# Patient Record
Sex: MALE | Race: WHITE | NOT HISPANIC OR LATINO | Employment: OTHER | URBAN - NONMETROPOLITAN AREA
[De-identification: names, ages, dates, MRNs, and addresses within clinical notes are randomized per-mention and may not be internally consistent; named-entity substitution may affect disease eponyms.]

---

## 2018-08-13 ENCOUNTER — APPOINTMENT (OUTPATIENT)
Dept: LAB | Facility: HOSPITAL | Age: 63
End: 2018-08-13

## 2018-08-13 ENCOUNTER — TRANSCRIBE ORDERS (OUTPATIENT)
Dept: ADMINISTRATIVE | Facility: HOSPITAL | Age: 63
End: 2018-08-13

## 2018-08-13 DIAGNOSIS — Z00.8 HEALTH EXAMINATION IN POPULATION SURVEY: Primary | ICD-10-CM

## 2018-08-13 DIAGNOSIS — Z00.8 HEALTH EXAMINATION IN POPULATION SURVEY: ICD-10-CM

## 2018-08-13 LAB
CHOLEST SERPL-MCNC: 270 MG/DL (ref 50–200)
EST. AVERAGE GLUCOSE BLD GHB EST-MCNC: 105 MG/DL
HBA1C MFR BLD HPLC: 5.3 %
HBA1C MFR BLD: 5.3 % (ref 4.2–6.3)
HDLC SERPL-MCNC: 44 MG/DL (ref 40–60)
LDLC SERPL CALC-MCNC: 192 MG/DL (ref 0–100)
NONHDLC SERPL-MCNC: 226 MG/DL
TRIGL SERPL-MCNC: 168 MG/DL

## 2018-08-13 PROCEDURE — 36415 COLL VENOUS BLD VENIPUNCTURE: CPT

## 2018-08-13 PROCEDURE — 80061 LIPID PANEL: CPT

## 2018-08-13 PROCEDURE — 83036 HEMOGLOBIN GLYCOSYLATED A1C: CPT

## 2018-08-22 ENCOUNTER — OFFICE VISIT (OUTPATIENT)
Dept: URGENT CARE | Facility: CLINIC | Age: 63
End: 2018-08-22
Payer: COMMERCIAL

## 2018-08-22 VITALS
HEART RATE: 67 BPM | SYSTOLIC BLOOD PRESSURE: 172 MMHG | DIASTOLIC BLOOD PRESSURE: 83 MMHG | RESPIRATION RATE: 18 BRPM | TEMPERATURE: 98.1 F | OXYGEN SATURATION: 98 %

## 2018-08-22 DIAGNOSIS — L23.7 ALLERGIC CONTACT DERMATITIS DUE TO PLANTS, EXCEPT FOOD: Primary | ICD-10-CM

## 2018-08-22 PROCEDURE — S9088 SERVICES PROVIDED IN URGENT: HCPCS | Performed by: NURSE PRACTITIONER

## 2018-08-22 PROCEDURE — 99213 OFFICE O/P EST LOW 20 MIN: CPT | Performed by: NURSE PRACTITIONER

## 2018-08-22 RX ORDER — METHYLPREDNISOLONE 4 MG/1
TABLET ORAL
Qty: 21 TABLET | Refills: 0 | Status: SHIPPED | OUTPATIENT
Start: 2018-08-22 | End: 2018-09-21

## 2018-08-22 RX ADMIN — Medication 10 MG: at 14:42

## 2018-08-22 NOTE — PROGRESS NOTES
[facility]        NAME: Rogene Gitelman is a 63 y o  male  : 1955    MRN: [id number]  DATE: 2018  TIME: 2:30 PM    Assessment and Plan   Allergic contact dermatitis due to plants, except food [L23 7]  1  Allergic contact dermatitis due to plants, except food  dexamethasone (DECADRON) injection 10 mg    Methylprednisolone 4 MG TBPK         Patient Instructions     Use Claritin during the day as directed and Benadryl at night as directed  May apply hydrocortisone cream to affected areas  Apply cool compresses to affected areas  Follow up with PCP in 3-5 days  Proceed to  ER if symptoms worsen  Chief Complaint     Chief Complaint   Patient presents with    Rash     Pt c/o a rash since Wednesday  History of Present Illness       Rash   The current episode started in the past 7 days  The problem has been gradually worsening since onset  The affected locations include the left lower leg, right lower leg and right upper leg  The rash is characterized by redness, swelling and itchiness  He was exposed to plant contact  Past treatments include anti-itch cream and antihistamine  The treatment provided mild relief  Review of Systems   Review of Systems   Constitutional: Negative  HENT: Negative  Eyes: Negative  Respiratory: Negative  Cardiovascular: Negative  Gastrointestinal: Negative  Genitourinary: Negative  Musculoskeletal: Negative  Skin: Positive for rash  Neurological: Negative  Hematological: Negative  Psychiatric/Behavioral: Negative            Current Medications       Current Outpatient Prescriptions:     Methylprednisolone 4 MG TBPK, Use as directed on package, Disp: 21 tablet, Rfl: 0    Current Facility-Administered Medications:     dexamethasone (DECADRON) injection 10 mg, 10 mg, Intramuscular, Once, FAIZAN Samuels    Current Allergies     Allergies as of 2018    (No Known Allergies)            The following portions of the patient's history were reviewed and updated as appropriate: allergies, current medications, past family history, past medical history, past social history, past surgical history and problem list      No past medical history on file  No past surgical history on file  No family history on file  Medications have been verified  Objective   BP (!) 172/83   Pulse 67   Temp 98 1 °F (36 7 °C)   Resp 18   SpO2 98%        Physical Exam     Physical Exam   Constitutional: He is oriented to person, place, and time  He appears well-developed and well-nourished  No distress  HENT:   Head: Normocephalic and atraumatic  Right Ear: External ear normal    Left Ear: External ear normal    Nose: Nose normal    Mouth/Throat: Oropharynx is clear and moist    Eyes: Conjunctivae and EOM are normal  Pupils are equal, round, and reactive to light  Neck: Normal range of motion  Neck supple  Cardiovascular: Normal rate, regular rhythm and normal heart sounds  Pulmonary/Chest: Effort normal and breath sounds normal    Abdominal: Soft  Bowel sounds are normal    Neurological: He is alert and oriented to person, place, and time  He has normal reflexes  Skin: Skin is warm and dry  Rash noted  Rash is maculopapular (large area on letral aspect of right leg from above knee to mid calf area  Scattered areas on left calf )  He is not diaphoretic  Psychiatric: He has a normal mood and affect   His behavior is normal  Judgment and thought content normal

## 2018-09-21 ENCOUNTER — OFFICE VISIT (OUTPATIENT)
Dept: INTERNAL MEDICINE CLINIC | Facility: CLINIC | Age: 63
End: 2018-09-21
Payer: COMMERCIAL

## 2018-09-21 VITALS
HEART RATE: 65 BPM | HEIGHT: 70 IN | RESPIRATION RATE: 16 BRPM | WEIGHT: 200 LBS | SYSTOLIC BLOOD PRESSURE: 134 MMHG | DIASTOLIC BLOOD PRESSURE: 78 MMHG | BODY MASS INDEX: 28.63 KG/M2 | TEMPERATURE: 98.6 F | OXYGEN SATURATION: 98 %

## 2018-09-21 DIAGNOSIS — Z13.29 SCREENING FOR HYPOTHYROIDISM: ICD-10-CM

## 2018-09-21 DIAGNOSIS — N52.9 ERECTILE DYSFUNCTION, UNSPECIFIED ERECTILE DYSFUNCTION TYPE: Primary | ICD-10-CM

## 2018-09-21 DIAGNOSIS — Z13.0 SCREENING, ANEMIA, DEFICIENCY, IRON: ICD-10-CM

## 2018-09-21 DIAGNOSIS — Z91.89 ENCOUNTER FOR HEPATITIS C VIRUS SCREENING TEST FOR HIGH RISK PATIENT: ICD-10-CM

## 2018-09-21 DIAGNOSIS — E78.00 HYPERCHOLESTEROLEMIA: ICD-10-CM

## 2018-09-21 DIAGNOSIS — Z12.5 SCREENING FOR PROSTATE CANCER: ICD-10-CM

## 2018-09-21 DIAGNOSIS — Z13.1 SCREENING FOR DIABETES MELLITUS: ICD-10-CM

## 2018-09-21 DIAGNOSIS — Z11.59 ENCOUNTER FOR HEPATITIS C VIRUS SCREENING TEST FOR HIGH RISK PATIENT: ICD-10-CM

## 2018-09-21 PROCEDURE — 1036F TOBACCO NON-USER: CPT | Performed by: INTERNAL MEDICINE

## 2018-09-21 PROCEDURE — 99204 OFFICE O/P NEW MOD 45 MIN: CPT | Performed by: INTERNAL MEDICINE

## 2018-09-21 PROCEDURE — 3008F BODY MASS INDEX DOCD: CPT | Performed by: INTERNAL MEDICINE

## 2018-09-21 RX ORDER — SILDENAFIL 100 MG/1
100 TABLET, FILM COATED ORAL DAILY PRN
Qty: 10 TABLET | Refills: 11 | Status: SHIPPED | OUTPATIENT
Start: 2018-09-21 | End: 2019-07-17

## 2018-09-21 NOTE — PROGRESS NOTES
Assessment/Plan:       Diagnoses and all orders for this visit:    Erectile dysfunction, unspecified erectile dysfunction type  -     sildenafil (VIAGRA) 100 mg tablet; Take 1 tablet (100 mg total) by mouth daily as needed for erectile dysfunction for up to 10 days    Hypercholesterolemia  -     LDL cholesterol, direct; Future    Encounter for hepatitis C virus screening test for high risk patient  -     Hepatitis C antibody; Future    Screening for prostate cancer  -     PSA, Total Screen    Screening for hypothyroidism  -     TSH, 3rd generation with Free T4 reflex    Screening for diabetes mellitus  -     Comprehensive metabolic panel    Screening, anemia, deficiency, iron  -     CBC and differential    Other orders  -     Cancel: Ambulatory referral to Colorectal Surgery  -     TURMERIC PO; Take by mouth daily        No problem-specific Assessment & Plan notes found for this encounter  The patient will follow up in the next 6 months and see how things go    Subjective:      Patient ID: Robbie Toussaint is a 66 y o  male  The patient was seen and examined and noted to have issues with hypercholesterolemia per the Lipid Panel that was performed just a month ago  The patient states that he has no other issues at this time  The patient states that his father had issues with hyper strokes in his early 46s  His BP is mildly hypertensive  We dicussed statin medication vs lifestyle change  I noted a low cholesterol diet and aerobic exercise might help and we discussed that  He notes issues maintaining an erection and stated he would be interest in a trial of Viagra  His BP is noted to be elevated today and will follow up on that at our next visit  We dicussed vaccination with Shingrix  He has followed up with a PSA on his next labs  The patient had a colonoscopy          The following portions of the patient's history were reviewed and updated as appropriate:   He has a past medical history of Carpal tunnel syndrome and Skin cancer  ,   does not have any pertinent problems on file  ,   has a past surgical history that includes Hernia repair; Back surgery; Neuroplasty / transposition median nerve at carpal tunnel bilateral; and Skin lesion excision  ,  family history includes Hypertension in his mother; Stroke in his father  ,   reports that he has never smoked  He has never used smokeless tobacco  He reports that he drinks alcohol  He reports that he does not use drugs  ,  has No Known Allergies     Current Outpatient Prescriptions   Medication Sig Dispense Refill    TURMERIC PO Take by mouth daily      sildenafil (VIAGRA) 100 mg tablet Take 1 tablet (100 mg total) by mouth daily as needed for erectile dysfunction for up to 10 days 10 tablet 11     No current facility-administered medications for this visit  Review of Systems   Constitutional: Negative for chills, fatigue and fever  HENT: Negative for ear pain, postnasal drip, rhinorrhea, sinus pain and sinus pressure  Respiratory: Negative for choking, shortness of breath and stridor  Cardiovascular: Negative for chest pain and palpitations  Gastrointestinal: Negative for abdominal pain, constipation, diarrhea and nausea  Genitourinary: Negative  Musculoskeletal: Negative for arthralgias, joint swelling and myalgias  Skin: Negative  Neurological: Negative  Psychiatric/Behavioral: Negative  Objective:  Vitals:    09/21/18 1302   BP: 134/78   BP Location: Left arm   Patient Position: Sitting   Cuff Size: Large   Pulse: 65   Resp: 16   Temp: 98 6 °F (37 °C)   SpO2: 98%   Weight: 90 7 kg (200 lb)   Height: 5' 9 5" (1 765 m)     Body mass index is 29 11 kg/m²  Physical Exam   Constitutional: He is oriented to person, place, and time  He appears well-developed and well-nourished  HENT:   Head: Normocephalic and atraumatic  Eyes: Conjunctivae and EOM are normal  Pupils are equal, round, and reactive to light   Right eye exhibits no discharge  No scleral icterus  Neck: Normal range of motion  Neck supple  Cardiovascular: Normal rate, regular rhythm, normal heart sounds and intact distal pulses  Exam reveals no gallop  No murmur heard  Pulmonary/Chest: Effort normal and breath sounds normal  No respiratory distress  He has no wheezes  He has no rales  Abdominal: Soft  Bowel sounds are normal  He exhibits no distension  There is no tenderness  There is no rebound  Musculoskeletal: Normal range of motion  He exhibits no edema or tenderness  Neurological: He is oriented to person, place, and time  No cranial nerve deficit  Coordination normal    Skin: Skin is warm and dry  No rash noted  No erythema  Psychiatric: He has a normal mood and affect  Nursing note and vitals reviewed

## 2019-01-09 ENCOUNTER — APPOINTMENT (OUTPATIENT)
Dept: LAB | Facility: CLINIC | Age: 64
End: 2019-01-09
Payer: COMMERCIAL

## 2019-01-09 DIAGNOSIS — E78.00 HYPERCHOLESTEROLEMIA: ICD-10-CM

## 2019-01-09 DIAGNOSIS — Z91.89 ENCOUNTER FOR HEPATITIS C VIRUS SCREENING TEST FOR HIGH RISK PATIENT: ICD-10-CM

## 2019-01-09 DIAGNOSIS — Z11.59 ENCOUNTER FOR HEPATITIS C VIRUS SCREENING TEST FOR HIGH RISK PATIENT: ICD-10-CM

## 2019-01-09 LAB
ALBUMIN SERPL BCP-MCNC: 4.1 G/DL (ref 3.5–5)
ALP SERPL-CCNC: 51 U/L (ref 46–116)
ALT SERPL W P-5'-P-CCNC: 32 U/L (ref 12–78)
ANION GAP SERPL CALCULATED.3IONS-SCNC: 3 MMOL/L (ref 4–13)
AST SERPL W P-5'-P-CCNC: 19 U/L (ref 5–45)
BASOPHILS # BLD AUTO: 0.06 THOUSANDS/ΜL (ref 0–0.1)
BASOPHILS NFR BLD AUTO: 1 % (ref 0–1)
BILIRUB SERPL-MCNC: 0.39 MG/DL (ref 0.2–1)
BUN SERPL-MCNC: 24 MG/DL (ref 5–25)
CALCIUM SERPL-MCNC: 8.7 MG/DL (ref 8.3–10.1)
CHLORIDE SERPL-SCNC: 105 MMOL/L (ref 100–108)
CO2 SERPL-SCNC: 28 MMOL/L (ref 21–32)
CREAT SERPL-MCNC: 1.01 MG/DL (ref 0.6–1.3)
EOSINOPHIL # BLD AUTO: 0.25 THOUSAND/ΜL (ref 0–0.61)
EOSINOPHIL NFR BLD AUTO: 5 % (ref 0–6)
ERYTHROCYTE [DISTWIDTH] IN BLOOD BY AUTOMATED COUNT: 12.6 % (ref 11.6–15.1)
GFR SERPL CREATININE-BSD FRML MDRD: 80 ML/MIN/1.73SQ M
GLUCOSE P FAST SERPL-MCNC: 98 MG/DL (ref 65–99)
HCT VFR BLD AUTO: 43.2 % (ref 36.5–49.3)
HGB BLD-MCNC: 14.3 G/DL (ref 12–17)
IMM GRANULOCYTES # BLD AUTO: 0.01 THOUSAND/UL (ref 0–0.2)
IMM GRANULOCYTES NFR BLD AUTO: 0 % (ref 0–2)
LDLC SERPL DIRECT ASSAY-MCNC: 181 MG/DL (ref 0–100)
LYMPHOCYTES # BLD AUTO: 2.25 THOUSANDS/ΜL (ref 0.6–4.47)
LYMPHOCYTES NFR BLD AUTO: 42 % (ref 14–44)
MCH RBC QN AUTO: 30.2 PG (ref 26.8–34.3)
MCHC RBC AUTO-ENTMCNC: 33.1 G/DL (ref 31.4–37.4)
MCV RBC AUTO: 91 FL (ref 82–98)
MONOCYTES # BLD AUTO: 0.53 THOUSAND/ΜL (ref 0.17–1.22)
MONOCYTES NFR BLD AUTO: 10 % (ref 4–12)
NEUTROPHILS # BLD AUTO: 2.23 THOUSANDS/ΜL (ref 1.85–7.62)
NEUTS SEG NFR BLD AUTO: 42 % (ref 43–75)
NRBC BLD AUTO-RTO: 0 /100 WBCS
PLATELET # BLD AUTO: 231 THOUSANDS/UL (ref 149–390)
PMV BLD AUTO: 9.7 FL (ref 8.9–12.7)
POTASSIUM SERPL-SCNC: 4.4 MMOL/L (ref 3.5–5.3)
PROT SERPL-MCNC: 7.5 G/DL (ref 6.4–8.2)
PSA SERPL-MCNC: 10.1 NG/ML (ref 0–4)
RBC # BLD AUTO: 4.74 MILLION/UL (ref 3.88–5.62)
SODIUM SERPL-SCNC: 136 MMOL/L (ref 136–145)
TSH SERPL DL<=0.05 MIU/L-ACNC: 0.91 UIU/ML (ref 0.36–3.74)
WBC # BLD AUTO: 5.33 THOUSAND/UL (ref 4.31–10.16)

## 2019-01-09 PROCEDURE — 85025 COMPLETE CBC W/AUTO DIFF WBC: CPT | Performed by: INTERNAL MEDICINE

## 2019-01-09 PROCEDURE — 84443 ASSAY THYROID STIM HORMONE: CPT | Performed by: INTERNAL MEDICINE

## 2019-01-09 PROCEDURE — 83721 ASSAY OF BLOOD LIPOPROTEIN: CPT

## 2019-01-09 PROCEDURE — 36415 COLL VENOUS BLD VENIPUNCTURE: CPT | Performed by: INTERNAL MEDICINE

## 2019-01-09 PROCEDURE — G0103 PSA SCREENING: HCPCS | Performed by: INTERNAL MEDICINE

## 2019-01-09 PROCEDURE — 86803 HEPATITIS C AB TEST: CPT

## 2019-01-09 PROCEDURE — 80053 COMPREHEN METABOLIC PANEL: CPT | Performed by: INTERNAL MEDICINE

## 2019-01-10 LAB — HCV AB SER QL: NORMAL

## 2019-01-18 DIAGNOSIS — R97.20 ELEVATED PSA: Primary | ICD-10-CM

## 2019-02-18 ENCOUNTER — OFFICE VISIT (OUTPATIENT)
Dept: INTERNAL MEDICINE CLINIC | Facility: CLINIC | Age: 64
End: 2019-02-18
Payer: COMMERCIAL

## 2019-02-18 VITALS
WEIGHT: 193.8 LBS | RESPIRATION RATE: 16 BRPM | HEART RATE: 61 BPM | OXYGEN SATURATION: 99 % | DIASTOLIC BLOOD PRESSURE: 70 MMHG | SYSTOLIC BLOOD PRESSURE: 122 MMHG | HEIGHT: 70 IN | BODY MASS INDEX: 27.75 KG/M2 | TEMPERATURE: 98 F

## 2019-02-18 DIAGNOSIS — R97.20 ELEVATED PSA: Primary | ICD-10-CM

## 2019-02-18 DIAGNOSIS — E78.00 HYPERCHOLESTEROLEMIA: ICD-10-CM

## 2019-02-18 PROCEDURE — 99214 OFFICE O/P EST MOD 30 MIN: CPT | Performed by: INTERNAL MEDICINE

## 2019-02-18 PROCEDURE — 1036F TOBACCO NON-USER: CPT | Performed by: INTERNAL MEDICINE

## 2019-02-18 PROCEDURE — 3008F BODY MASS INDEX DOCD: CPT | Performed by: INTERNAL MEDICINE

## 2019-02-18 NOTE — PROGRESS NOTES
Assessment/Plan:       Diagnoses and all orders for this visit:    Elevated PSA  -     PSA, Total Screen; Future    Hypercholesterolemia  -     LDL cholesterol, direct; Future        No problem-specific Assessment & Plan notes found for this encounter  We will follow up on his PSA in the next several weeks  The patient states that he will engage in a low cholesterol diet and will follow up on with a repeat LDL in 6 months  Subjective:      Patient ID: Mino Shay is a 66 y o  male  We reviewed the patient's LDL cholesterol which was noted to be elevated and we discussed a low cholesterol diet  The patient was also noted to have issues with an elevated PSA  We noted that the benign reasons why this would be elevated  I noted we would recheck this and is elevated we would refer her to urology if persistently elevated  The following portions of the patient's history were reviewed and updated as appropriate:   He has a past medical history of Carpal tunnel syndrome and Skin cancer  ,  does not have any pertinent problems on file  ,   has a past surgical history that includes Hernia repair; Back surgery; Neuroplasty / transposition median nerve at carpal tunnel bilateral; and Skin lesion excision  ,  family history includes Hypertension in his mother; Stroke in his father  ,   reports that he has never smoked  He has never used smokeless tobacco  He reports that he drinks alcohol  He reports that he does not use drugs  ,  has No Known Allergies     Current Outpatient Medications   Medication Sig Dispense Refill    TURMERIC PO Take by mouth daily      sildenafil (VIAGRA) 100 mg tablet Take 1 tablet (100 mg total) by mouth daily as needed for erectile dysfunction for up to 10 days 10 tablet 11     No current facility-administered medications for this visit  Review of Systems   Constitutional: Negative for chills, fatigue and fever     HENT: Negative for ear pain, postnasal drip, rhinorrhea, sinus pressure and sinus pain  Respiratory: Negative for cough, chest tightness, shortness of breath and stridor  Cardiovascular: Negative for chest pain, palpitations and leg swelling  Gastrointestinal: Negative for anal bleeding, constipation, diarrhea and nausea  Genitourinary: Negative  Musculoskeletal: Negative for back pain, joint swelling, myalgias and neck pain  Neurological: Negative  Psychiatric/Behavioral: Negative  Objective:  Vitals:    02/18/19 1520   BP: 122/70   BP Location: Left arm   Patient Position: Sitting   Cuff Size: Large   Pulse: 61   Resp: 16   Temp: 98 °F (36 7 °C)   SpO2: 99%   Weight: 87 9 kg (193 lb 12 8 oz)   Height: 5' 9 5" (1 765 m)     Body mass index is 28 21 kg/m²  Physical Exam   Constitutional: He is oriented to person, place, and time  He appears well-developed and well-nourished  HENT:   Head: Normocephalic and atraumatic  Eyes: Pupils are equal, round, and reactive to light  EOM are normal    Neck: Neck supple  Cardiovascular: Normal rate, regular rhythm, normal heart sounds and intact distal pulses  Exam reveals no friction rub  No murmur heard  Pulmonary/Chest: Effort normal and breath sounds normal  No stridor  No respiratory distress  Abdominal: Soft  Bowel sounds are normal  He exhibits no distension  There is no tenderness  Musculoskeletal: Normal range of motion  Neurological: He is alert and oriented to person, place, and time  Skin: Skin is warm and dry  Psychiatric: He has a normal mood and affect

## 2019-04-26 ENCOUNTER — TRANSCRIBE ORDERS (OUTPATIENT)
Dept: LAB | Facility: CLINIC | Age: 64
End: 2019-04-26

## 2019-04-26 ENCOUNTER — APPOINTMENT (OUTPATIENT)
Dept: LAB | Facility: CLINIC | Age: 64
End: 2019-04-26
Payer: COMMERCIAL

## 2019-04-26 DIAGNOSIS — R97.20 ELEVATED PROSTATE SPECIFIC ANTIGEN (PSA): Primary | ICD-10-CM

## 2019-04-26 DIAGNOSIS — E78.00 HYPERCHOLESTEROLEMIA: ICD-10-CM

## 2019-04-26 DIAGNOSIS — R97.20 ELEVATED PROSTATE SPECIFIC ANTIGEN (PSA): ICD-10-CM

## 2019-04-26 LAB — LDLC SERPL DIRECT ASSAY-MCNC: 174 MG/DL (ref 0–100)

## 2019-04-26 PROCEDURE — 36415 COLL VENOUS BLD VENIPUNCTURE: CPT

## 2019-04-26 PROCEDURE — 84153 ASSAY OF PSA TOTAL: CPT

## 2019-04-26 PROCEDURE — 83721 ASSAY OF BLOOD LIPOPROTEIN: CPT

## 2019-04-26 PROCEDURE — 84154 ASSAY OF PSA FREE: CPT

## 2019-04-27 LAB
PSA FREE MFR SERPL: 6.4 %
PSA FREE SERPL-MCNC: 0.86 NG/ML
PSA SERPL-MCNC: 13.4 NG/ML (ref 0–4)

## 2019-05-14 ENCOUNTER — TELEPHONE (OUTPATIENT)
Dept: UROLOGY | Facility: CLINIC | Age: 64
End: 2019-05-14

## 2019-05-20 ENCOUNTER — OFFICE VISIT (OUTPATIENT)
Dept: UROLOGY | Facility: CLINIC | Age: 64
End: 2019-05-20
Payer: COMMERCIAL

## 2019-05-20 VITALS
BODY MASS INDEX: 27.92 KG/M2 | SYSTOLIC BLOOD PRESSURE: 142 MMHG | DIASTOLIC BLOOD PRESSURE: 78 MMHG | WEIGHT: 195 LBS | HEIGHT: 70 IN | HEART RATE: 60 BPM

## 2019-05-20 DIAGNOSIS — R97.20 ELEVATED PSA: Primary | ICD-10-CM

## 2019-05-20 LAB
SL AMB  POCT GLUCOSE, UA: ABNORMAL
SL AMB LEUKOCYTE ESTERASE,UA: ABNORMAL
SL AMB POCT BILIRUBIN,UA: ABNORMAL
SL AMB POCT BLOOD,UA: ABNORMAL
SL AMB POCT CLARITY,UA: CLEAR
SL AMB POCT COLOR,UA: YELLOW
SL AMB POCT KETONES,UA: ABNORMAL
SL AMB POCT NITRITE,UA: ABNORMAL
SL AMB POCT PH,UA: 6
SL AMB POCT SPECIFIC GRAVITY,UA: 1
SL AMB POCT URINE PROTEIN: 30
SL AMB POCT UROBILINOGEN: ABNORMAL

## 2019-05-20 PROCEDURE — 99204 OFFICE O/P NEW MOD 45 MIN: CPT | Performed by: UROLOGY

## 2019-05-20 PROCEDURE — 81002 URINALYSIS NONAUTO W/O SCOPE: CPT | Performed by: UROLOGY

## 2019-05-22 ENCOUNTER — TELEPHONE (OUTPATIENT)
Dept: UROLOGY | Facility: CLINIC | Age: 64
End: 2019-05-22

## 2019-05-22 DIAGNOSIS — R97.20 ELEVATED PSA: Primary | ICD-10-CM

## 2019-05-22 RX ORDER — SULFAMETHOXAZOLE AND TRIMETHOPRIM 800; 160 MG/1; MG/1
1 TABLET ORAL EVERY 12 HOURS SCHEDULED
Qty: 2 TABLET | Refills: 0 | Status: SHIPPED | OUTPATIENT
Start: 2019-05-22 | End: 2019-05-23

## 2019-06-10 ENCOUNTER — PROCEDURE VISIT (OUTPATIENT)
Dept: UROLOGY | Facility: CLINIC | Age: 64
End: 2019-06-10
Payer: COMMERCIAL

## 2019-06-10 VITALS
WEIGHT: 194 LBS | DIASTOLIC BLOOD PRESSURE: 60 MMHG | BODY MASS INDEX: 27.84 KG/M2 | HEART RATE: 58 BPM | SYSTOLIC BLOOD PRESSURE: 130 MMHG

## 2019-06-10 DIAGNOSIS — R97.20 ELEVATED PSA: Primary | ICD-10-CM

## 2019-06-10 PROCEDURE — G0416 PROSTATE BIOPSY, ANY MTHD: HCPCS | Performed by: PATHOLOGY

## 2019-06-10 PROCEDURE — 76942 ECHO GUIDE FOR BIOPSY: CPT | Performed by: UROLOGY

## 2019-06-10 PROCEDURE — 55700 PR BIOPSY OF PROSTATE,NEEDLE/PUNCH: CPT | Performed by: UROLOGY

## 2019-06-10 PROCEDURE — 88344 IMHCHEM/IMCYTCHM EA MLT ANTB: CPT | Performed by: PATHOLOGY

## 2019-06-10 PROCEDURE — 76872 US TRANSRECTAL: CPT | Performed by: UROLOGY

## 2019-06-10 PROCEDURE — 96372 THER/PROPH/DIAG INJ SC/IM: CPT

## 2019-06-10 RX ORDER — GENTAMICIN SULFATE 40 MG/ML
1.1 INJECTION, SOLUTION INTRAMUSCULAR; INTRAVENOUS ONCE
Status: COMPLETED | OUTPATIENT
Start: 2019-06-10 | End: 2019-06-10

## 2019-06-10 RX ADMIN — GENTAMICIN SULFATE 80 MG: 40 INJECTION, SOLUTION INTRAMUSCULAR; INTRAVENOUS at 13:24

## 2019-06-17 PROBLEM — C61 PROSTATE CANCER (HCC): Status: ACTIVE | Noted: 2019-01-18

## 2019-06-19 ENCOUNTER — OFFICE VISIT (OUTPATIENT)
Dept: UROLOGY | Facility: CLINIC | Age: 64
End: 2019-06-19

## 2019-06-19 ENCOUNTER — APPOINTMENT (OUTPATIENT)
Dept: LAB | Facility: MEDICAL CENTER | Age: 64
End: 2019-06-19
Payer: COMMERCIAL

## 2019-06-19 VITALS
BODY MASS INDEX: 27.92 KG/M2 | WEIGHT: 195 LBS | HEART RATE: 60 BPM | HEIGHT: 70 IN | RESPIRATION RATE: 20 BRPM | SYSTOLIC BLOOD PRESSURE: 140 MMHG | DIASTOLIC BLOOD PRESSURE: 80 MMHG

## 2019-06-19 DIAGNOSIS — C61 PROSTATE CANCER (HCC): Primary | ICD-10-CM

## 2019-06-19 LAB
ANION GAP SERPL CALCULATED.3IONS-SCNC: 8 MMOL/L (ref 4–13)
BUN SERPL-MCNC: 18 MG/DL (ref 5–25)
CALCIUM SERPL-MCNC: 9.2 MG/DL (ref 8.3–10.1)
CHLORIDE SERPL-SCNC: 105 MMOL/L (ref 100–108)
CO2 SERPL-SCNC: 27 MMOL/L (ref 21–32)
CREAT SERPL-MCNC: 1.03 MG/DL (ref 0.6–1.3)
GFR SERPL CREATININE-BSD FRML MDRD: 78 ML/MIN/1.73SQ M
GLUCOSE SERPL-MCNC: 92 MG/DL (ref 65–140)
POTASSIUM SERPL-SCNC: 4.4 MMOL/L (ref 3.5–5.3)
SODIUM SERPL-SCNC: 140 MMOL/L (ref 136–145)

## 2019-06-19 PROCEDURE — 36415 COLL VENOUS BLD VENIPUNCTURE: CPT | Performed by: UROLOGY

## 2019-06-19 PROCEDURE — 80048 BASIC METABOLIC PNL TOTAL CA: CPT | Performed by: UROLOGY

## 2019-06-19 PROCEDURE — 99214 OFFICE O/P EST MOD 30 MIN: CPT | Performed by: UROLOGY

## 2019-06-24 ENCOUNTER — TELEPHONE (OUTPATIENT)
Dept: UROLOGY | Facility: MEDICAL CENTER | Age: 64
End: 2019-06-24

## 2019-06-24 ENCOUNTER — PREP FOR PROCEDURE (OUTPATIENT)
Dept: UROLOGY | Facility: CLINIC | Age: 64
End: 2019-06-24

## 2019-06-24 DIAGNOSIS — C61 PROSTATE CANCER (HCC): Primary | ICD-10-CM

## 2019-06-24 RX ORDER — CLINDAMYCIN PHOSPHATE 900 MG/50ML
900 INJECTION INTRAVENOUS EVERY 8 HOURS
Status: CANCELLED | OUTPATIENT
Start: 2019-09-04

## 2019-06-25 ENCOUNTER — HOSPITAL ENCOUNTER (OUTPATIENT)
Dept: CT IMAGING | Facility: HOSPITAL | Age: 64
Discharge: HOME | End: 2019-06-25
Payer: COMMERCIAL

## 2019-06-25 DIAGNOSIS — C61 PROSTATE CANCER (HCC): ICD-10-CM

## 2019-06-25 PROCEDURE — 72194 CT PELVIS W/O & W/DYE: CPT

## 2019-06-25 RX ADMIN — IOHEXOL 100 ML: 350 INJECTION, SOLUTION INTRAVENOUS at 08:41

## 2019-06-27 ENCOUNTER — TELEPHONE (OUTPATIENT)
Dept: UROLOGY | Facility: CLINIC | Age: 64
End: 2019-06-27

## 2019-06-27 ENCOUNTER — TELEPHONE (OUTPATIENT)
Dept: UROLOGY | Facility: MEDICAL CENTER | Age: 64
End: 2019-06-27

## 2019-07-01 ENCOUNTER — TELEPHONE (OUTPATIENT)
Dept: UROLOGY | Facility: AMBULATORY SURGERY CENTER | Age: 64
End: 2019-07-01

## 2019-07-01 NOTE — TELEPHONE ENCOUNTER
Patient managed by Helena Guerrier at [facility] is calling to ask if he can get a call back to discuss results of cat scan  He is aware of his upcoming appointment but requesting call

## 2019-07-01 NOTE — TELEPHONE ENCOUNTER
Can you let him know that there is a bladder stone, which will be addressed at the time of surgery, but no signs of cancer outside the prostate, no enlarged lymph nodes

## 2019-07-16 ENCOUNTER — DOCUMENTATION (OUTPATIENT)
Dept: UROLOGY | Facility: AMBULATORY SURGERY CENTER | Age: 64
End: 2019-07-16

## 2019-07-16 NOTE — PROGRESS NOTES
Oncology Navigator Note: Multidisciplinary Urology Case Review 7/16/19  Physician Recommended Plan     Elvin Judge is a 66 y o  male     Diagnosis: Prostate Cancer, Larry 7    Patient was discussed at the Multidisciplinary Urology Case review on 7/16/19  The group recommended that the patient should have definitive treatment for his Prostate Cancer with either prostatectomy or Radiation therapy

## 2019-07-17 ENCOUNTER — OFFICE VISIT (OUTPATIENT)
Dept: UROLOGY | Facility: CLINIC | Age: 64
End: 2019-07-17
Payer: COMMERCIAL

## 2019-07-17 VITALS
SYSTOLIC BLOOD PRESSURE: 122 MMHG | HEART RATE: 68 BPM | BODY MASS INDEX: 27.63 KG/M2 | WEIGHT: 193 LBS | HEIGHT: 70 IN | DIASTOLIC BLOOD PRESSURE: 80 MMHG

## 2019-07-17 DIAGNOSIS — C61 PROSTATE CANCER (HCC): Primary | ICD-10-CM

## 2019-07-17 DIAGNOSIS — N52.9 ERECTILE DYSFUNCTION, UNSPECIFIED ERECTILE DYSFUNCTION TYPE: ICD-10-CM

## 2019-07-17 PROCEDURE — 99214 OFFICE O/P EST MOD 30 MIN: CPT | Performed by: UROLOGY

## 2019-07-17 RX ORDER — SILDENAFIL CITRATE 20 MG/1
20 TABLET ORAL ONCE
Qty: 90 TABLET | Refills: 1 | Status: SHIPPED | OUTPATIENT
Start: 2019-07-17 | End: 2020-04-27

## 2019-07-17 NOTE — PROGRESS NOTES
UROLOGY FOLLOWUP NOTE     CHIEF COMPLAINT   Devon Gregory is a 66 y o  male with a complaint of   Chief Complaint   Patient presents with    Prostate Cancer       History of Present Illness:     66 y o  male who recently underwent PSA testing an outside urologist PSA was elevated at 10  Repeat PSA karen to 13  Patient denies any significant urinary symptoms of infection or inflammation  He denies a family history of prostate cancer although he thinks his father may have had some type of prostate procedure  No prostate cancer in his brothers  Presents today for urgent evaluation  Of note, his daughter is a 77 [facility] nurse and his wife is also an RN  Lab Results   Component Value Date    PSA 13 4 (H) 04/26/2019    PSA 10 1 (H) 01/09/2019     AUA SS 7, LYLE 19  Uses Viagra 100 mg    Patient was previously scheduled for biopsy would either due to an acute GI illness or reaction to the antibiotics, the patient had vomiting and diarrhea the day leading into his procedure  We reschedule the procedure with pre-administration of IM gentamycin  Patient does have intermediate repair risk prostate cancer  He underwent a CT of the pelvis and returns for results  He was referred to Radiation Oncology but is interested in moving forward with surgical therapy  He presents with his wife for discussion      Past Medical History:     Past Medical History:   Diagnosis Date    Carpal tunnel syndrome     Prostate cancer ([facility] [address] )     Skin cancer        PAST SURGICAL HISTORY:     Past Surgical History:   Procedure Laterality Date    BACK SURGERY      HERNIATED DISC    HERNIA REPAIR      NEUROPLASTY / TRANSPOSITION MEDIAN NERVE AT CARPAL TUNNEL BILATERAL      PROSTATE BIOPSY      SKIN LESION EXCISION         CURRENT MEDICATIONS:     Current Outpatient Medications   Medication Sig Dispense Refill    TURMERIC PO Take by mouth daily      bisacodyl (FLEET) 10 MG/30ML ENEM Insert 30 mL (10 mg total) into the rectum once for 1 dose Day of biopsy 30 mL 0    sildenafil (REVATIO) 20 mg tablet Take 1 tablet (20 mg total) by mouth once for 1 dose On empty stomach, one hour before intercourse 90 tablet 1     No current facility-administered medications for this visit  ALLERGIES:     Allergies   Allergen Reactions    Ciprofloxacin GI Intolerance    Keflex [Cephalexin] GI Intolerance       SOCIAL HISTORY:     Social History     Socioeconomic History    Marital status: /Civil Union     Spouse name: None    Number of children: None    Years of education: None    Highest education level: None   Occupational History    Occupation: EMPLOYED   Social Needs    Financial resource strain: None    Food insecurity:     Worry: None     Inability: None    Transportation needs:     Medical: None     Non-medical: None   Tobacco Use    Smoking status: Never Smoker    Smokeless tobacco: Never Used   Substance and Sexual Activity    Alcohol use: Yes    Drug use: No    Sexual activity: None   Lifestyle    Physical activity:     Days per week: None     Minutes per session: None    Stress: None   Relationships    Social connections:     Talks on phone: None     Gets together: None     Attends Worship service: None     Active member of club or organization: None     Attends meetings of clubs or organizations: None     Relationship status: None    Intimate partner violence:     Fear of current or ex partner: None     Emotionally abused: None     Physically abused: None     Forced sexual activity: None   Other Topics Concern    None   Social History Narrative        EMPLOYED       SOCIAL HISTORY:     Family History   Problem Relation Age of Onset    Hypertension Mother     Stroke Father        REVIEW OF SYSTEMS:     Review of Systems   Constitutional: Negative  Respiratory: Negative  Cardiovascular: Negative  Gastrointestinal: Negative  Genitourinary: Negative  Musculoskeletal: Negative  Skin: Negative  Psychiatric/Behavioral: Negative  PHYSICAL EXAM:     /80   Pulse 68   Ht 5' 10" (1 778 m)   Wt 87 5 kg (193 lb)   BMI 27 69 kg/m²     General:  Healthy appearing male in no acute distress  They have a normal affect  There is not appear to be any gross neurologic defects or abnormalities  HEENT:  Normocephalic, atraumatic  Neck is supple without any palpable lymphadenopathy  Cardiovascular:  Patient has normal palpable distal radial pulses  There is no significant peripheral edema  No JVD is noted  Respiratory:  Patient has unlabored respirations  There is no audible wheeze or rhonchi  Abdomen:  Abdomen is without surgical scars  Abdomen is soft and nontender  There is no tympany  Inguinal and umbilical hernia are not appreciated  Musculoskeletal:  Patient does not have significant CVA tenderness in the  flank with palpation or percussion  They full range of motion in all 4 extremities  Strength in all 4 extremities appears congruent  Patient is able to ambulate without assistance or difficulty  Dermatologic:  Patient has no skin abnormalities or rashes  LABS:     CBC:   Lab Results   Component Value Date    WBC 5 33 2019    HGB 14 3 2019    HCT 43 2 2019    MCV 91 2019     2019       BMP:   Lab Results   Component Value Date    CALCIUM 9 2 2019    K 4 4 2019    CO2 27 2019     2019    BUN 18 2019    CREATININE 1 03 2019     Lab Results   Component Value Date    PSA 13 4 (H) 2019    PSA 10 1 (H) 2019     IMAGIN/25/19  CT PELVIS WITH IV CONTRAST     INDICATION:   C61: Malignant neoplasm of prostate      COMPARISON:  None      TECHNIQUE: CT examination of the pelvis was performed    Axial, sagittal, and coronal 2D reformatted images were created from the source data and submitted for interpretation      Radiation dose length product (DLP) for this visit:  434 2 mGy-cm   This examination, like all CT scans performed in the [facility], was performed utilizing techniques to minimize radiation dose exposure, including the use of iterative   reconstruction and automated exposure control      IV Contrast:  100 mL of iohexol (OMNIPAQUE)  Enteric Contrast:  Enteric contrast was administered       FINDINGS:     REPRODUCTIVE ORGANS:  Prostamegaly protruding into the bladder base      URINARY BLADDER:  4 mm right bladder calculus     APPENDIX:  No findings to suggest appendicitis      VISUALIZED BOWEL:  Unremarkable      ABDOMINOPELVIC CAVITY:  No ascites or free intraperitoneal air  No lymphadenopathy  VISUALIZED VESSELS:  Unremarkable for patient's age  ABDOMINOPELVIC WALL/INGUINAL REGIONS: Fat-containing left inguinal hernia     OSSEOUS STRUCTURES:  No acute fracture or destructive osseous lesion      IMPRESSION:     No signs of metastatic disease to the pelvis      Prostamegaly      4 mm right bladder calculus  PATHOLOGY:     6/10/19 - TRUS 38 5g  Final Diagnosis   A  Prostate, left lateral base, needle core biopsy:  - Prostatic adenocarcinoma, [place] grade 3 + 4 = 7 (70% pattern 3 and 30% pattern 4, Grade group 2), two small foci of cancer, involving one of one core, 30% of the core, and spanning 80% of the core discontinuously  - Perineural invasion is present      B  Prostate,  left lateral mid, needle core biopsy:  - Prostatic adenocarcinoma, Larry grade 3 + 3 = 6 (Grade group 1), involving one of one core and 10% of the tissue   - Focal high-grade prostatic intraepithelial neoplasia (HGPIN)  - Perineural invasion is absent  C  Prostate,  left lateral apex, needle core biopsy:  - Prostatic adenocarcinoma, Larry grade 3 + 4 = 7 (80% pattern 3 and 20% pattern 4, Grade group 2), involving one of one core and 20% of the tissue   - Perineural invasion is present      D   Prostate,  left medial base, needle core biopsy:  - Benign prostate glands and stroma      E  Prostate,  left medial mid, needle core biopsy:  - Prostatic adenocarcinoma, Larry grade 3 + 4 = 7 (90% pattern 3 and 10% pattern 4, Grade group 2), involving one of two cores and 70% of the involved core  - Perineural invasion is present  F  Prostate,  left medial apex, needle core biopsy:  - Prostatic adenocarcinoma, [place] grade 3 + 3 = 6 (Grade group 1), involving one of one core, 30% of the tissue,  and spanning 90% of the core discontinuously  - Perineural invasion is absent  G  Prostate, right lateral base, needle core biopsy:  - Benign prostate glands and stroma      H  Prostate, right lateral mid, needle core biopsy:  - Prostatic adenocarcinoma, [place] grade 3 + 4 = 7 (80% pattern 3 and 20% pattern 4, Grade group 2), involving one of one core and 5% of the tissue   - High-grade prostatic intraepithelial neoplasia (HGPIN)  - Perineural invasion is absent      I  Prostate, right lateral apex, needle core biopsy:  - Prostatic adenocarcinoma, Larry grade 3 + 3 = 6 (Grade group 1), involving one of one core and 30% of the tissue   - Perineural invasion is absent  J  Prostate, right medial base, needle core biopsy:  - Prostatic adenocarcinoma, [place] grade 3 + 3 = 6 (Grade group 1), involving one of one core and 5% of the tissue   - Focal high-grade prostatic intraepithelial neoplasia (HGPIN)  - Perineural invasion is absent  K  Prostate, right medial mid, needle core biopsy:  - Prostatic adenocarcinoma, Larry grade 3 + 4 = 7 (70% pattern 3 and 30% pattern 4, Grade group 2), discontinuously involving one of one core and 40% of the tissue   - Perineural invasion is absent      L  Prostate, right medial apex, needle core biopsy:  - Prostatic adenocarcinoma, [place] grade 3 + 4 = 7 (90% pattern 3 and 10% pattern 4, Grade group 2), involving one of one core and 40% of the tissue   - Perineural invasion is absent       PRETREATMENT NOMOGRAM:         ASSESSMENT: 66 y o  male with newly diagnosed cT1c prostate adenocarcinoma, 10/12 cores positive, highest Larry score 3+4=7    PLAN:     Patient has decided to proceed with radical prostatectomy  He is scheduled on 9/4/2019  we reviewed in great depth the plan for the surgery, the inherent risks and benefits and the postoperative course  I did offer the patient pre ability shin with pelvic floor physical therapy which he is accepted  I discussed with the patient the plan for bilateral nerve-sparing should the planes be sufficient at the time of surgery  Patient's primary goal is cancer control and so we discussed wide resection if there is any concern  All questions have been answered and patient will proceed in September

## 2019-07-22 ENCOUNTER — TELEPHONE (OUTPATIENT)
Dept: UROLOGY | Facility: MEDICAL CENTER | Age: 64
End: 2019-07-22

## 2019-07-22 NOTE — TELEPHONE ENCOUNTER
Patient of Dr Annetta Medellin seen in [facility] office  Patient wife called in stated the fax machine is down and if her FMLA paperwork was sent office was unable to receive  Wife requesting email to be sent to [email or web address] and a copy as well for her records

## 2019-07-24 NOTE — TELEPHONE ENCOUNTER
There is no [place] paper work on file for the pt's wife  There is nothing in his chart nor do Cecily or myself have copies of anything for her  I also confirmed with Brandie at the [place] [id number] office where the pt was seen that there is no paper work there either  I called and left a message for Janis Fearing asking for her to call back to discuss the paper work and how she will send me a copy of it to be filled out and sent back to her

## 2019-07-29 ENCOUNTER — TELEPHONE (OUTPATIENT)
Dept: UROLOGY | Facility: CLINIC | Age: 64
End: 2019-07-29

## 2019-07-29 ENCOUNTER — TELEPHONE (OUTPATIENT)
Dept: PHYSICAL THERAPY | Facility: CLINIC | Age: 64
End: 2019-07-29

## 2019-07-29 NOTE — TELEPHONE ENCOUNTER
Returned patients phone call in regards to scheduling for PT evaluation  Left voicemail with return number

## 2019-07-29 NOTE — TELEPHONE ENCOUNTER
Contacted and spoke with patient in regards to his FMLA paperwork  Informed patient his paperwork is complete  Patient wishes paperwork to be mailed to his home address  FMLA mailed today  Copies made for chart

## 2019-08-05 ENCOUNTER — EVALUATION (OUTPATIENT)
Dept: PHYSICAL THERAPY | Facility: CLINIC | Age: 64
End: 2019-08-05
Payer: COMMERCIAL

## 2019-08-05 DIAGNOSIS — C61 PROSTATE CANCER (HCC): Primary | ICD-10-CM

## 2019-08-05 PROCEDURE — 97162 PT EVAL MOD COMPLEX 30 MIN: CPT | Performed by: PHYSICAL THERAPIST

## 2019-08-05 PROCEDURE — 97112 NEUROMUSCULAR REEDUCATION: CPT | Performed by: PHYSICAL THERAPIST

## 2019-08-05 NOTE — PROGRESS NOTES
PT Evaluation     Today's date: 2019  Patient name: Fani Cardoso  : 1955  MRN: [id number]  Referring provider: Saima Lobo, *  Dx:   Encounter Diagnosis     ICD-10-CM    1  Prostate cancer ([facility] [address] ) C61        Start Time: 1100  Stop Time: 1200  Total time in clinic (min): 60 minutes    Assessment  Assessment details: Mr Matthew Cortez presents for PT evaluation for education regarding pelvic floor exercises  He is newly diagnosed cT1c prostate adenocarcinoma, 10/12 cores positive, highest [place] score 3+4=7 per last urology note  He is scheduled for Robotic Assisted Prostatectomy on 19  The patient demonstrated good PFM coordination, fair strength, good recruitment and derecruitment, good endurance and exercise form during PT evaluation  He was provided with an individualized home exercise program as well as home recommendations to reduce post op SHAYNA during lifting/coughing/transition activities  The patient was educated on normal post op return of continence and was encouraged to return to PT with any additional exercise needs at the instruction of his physician  No further PT needs at this time  Thank you for referring Mr Whitman to PT  Goals  STGs to be met in 4 weeks:  * Patient will be compliant with introductory HEP as prescribed  Progress: Provided at IE  * Patient presents with good understanding of pelvic floor protective strategies to reduce intra-abdominal pressure against pelvic organs  Progress MET at IE  LTGs (12 weeks post op)  * Normalize sEMG findings to indicate strength average > 12uV and resting average < 2 0uV  Progress: Demonstrated appropriately at IE  * Demonstrates correct isolation and relaxation of pelvic floor to palpation without overflow from global stabilizers  * Reports that she/he can cough/laugh/sneeze with at least 50% less bladder leakage 12 weeks post op  Progress: TBD      * Patient will use pelvic floor muscles correctly during functional ADLs such as coughing, sneezing, lifting and exercise activities to avoid excessive IAP and PFM strain  Progress:  Demonstrated appropriately at IE  * Patient will be compliant with comprehensive home exercise program for self management of condition  HEP provided at IE  Plan  Planned therapy interventions: therapeutic exercise, therapeutic activities, neuromuscular re-education and home exercise program  Treatment plan discussed with: patient        PT Pelvic Floor Subjective:   History of Present Illness:   Pt presents for Pre-Op PT evaluation to learn exercises prior to his prostatectomy scheduled for 2019  The patient reports he does not have prior experience with pelvic floor exercises  He denies any bowel or bladder symptoms at this time  Social Support:     Lives in:  Multiple-level home    Lives with:  Spouse and adult children    Relationship status: /committed    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 [employer]     Life stress level: 7  Diet and Exercise:      "I"m always on the go"  Pt reports that he stays active but no specific exercise program    Bladder Function:     Voiding Difficulties comments:     Urinary leakage: no urine leakage    Nocturia (episodes per night): 0    Intake (ounces): Water: 72, Coffee: 8, Alcohol intake (oz): occasionally a glass of wine  Bowel Function:     Bowel frequency: daily  Pain:     Current pain ratin  Patient Goals: Other patient goals:  Learn exercises to improve continence  Objective   Pelvic Floor Exam     SMEG Biofeedback   Sensor used: external sensors placed perianally  Recruitment: brisk  Holding ability: good  Derecruitment: goodwithin normal limits               Precautions: Hx Prostate CA    Biofeedback  · 5W10R: x10 reps  HEP:   · Hooklying pelvic floor contreaction: 10x, exhalation  · Seated hip ABD with PF contraction and resistance loop: 10x, exhalation    · Supine bridge with PFM contraction  · Seated exhale with PFM contraction and hand to mouth/cough  · Seated ball squeeze  · Standing ski pose 110x10 seconds, breathing normally

## 2019-08-22 ENCOUNTER — ANESTHESIA EVENT (OUTPATIENT)
Dept: PERIOP | Facility: HOSPITAL | Age: 64
End: 2019-08-22
Payer: COMMERCIAL

## 2019-08-22 RX ORDER — SODIUM CHLORIDE 9 MG/ML
125 INJECTION, SOLUTION INTRAVENOUS CONTINUOUS
Status: CANCELLED | OUTPATIENT
Start: 2019-09-04

## 2019-08-23 ENCOUNTER — APPOINTMENT (OUTPATIENT)
Dept: PREADMISSION TESTING | Facility: HOSPITAL | Age: 64
End: 2019-08-23
Payer: COMMERCIAL

## 2019-08-23 ENCOUNTER — HOSPITAL ENCOUNTER (OUTPATIENT)
Dept: NON INVASIVE DIAGNOSTICS | Facility: HOSPITAL | Age: 64
Discharge: HOME | End: 2019-08-23
Attending: UROLOGY
Payer: COMMERCIAL

## 2019-08-23 ENCOUNTER — APPOINTMENT (OUTPATIENT)
Dept: LAB | Facility: HOSPITAL | Age: 64
End: 2019-08-23
Attending: UROLOGY
Payer: COMMERCIAL

## 2019-08-23 DIAGNOSIS — C61 PROSTATE CANCER (HCC): ICD-10-CM

## 2019-08-23 LAB
ABO GROUP BLD: NORMAL
ANION GAP SERPL CALCULATED.3IONS-SCNC: 8 MMOL/L (ref 4–13)
APTT PPP: 26 SECONDS (ref 23–37)
ATRIAL RATE: 56 BPM
BASOPHILS # BLD AUTO: 0.07 THOUSANDS/ΜL (ref 0–0.1)
BASOPHILS NFR BLD AUTO: 1 % (ref 0–1)
BLD GP AB SCN SERPL QL: NEGATIVE
BUN SERPL-MCNC: 20 MG/DL (ref 5–25)
CALCIUM SERPL-MCNC: 9.1 MG/DL (ref 8.3–10.1)
CHLORIDE SERPL-SCNC: 105 MMOL/L (ref 100–108)
CO2 SERPL-SCNC: 28 MMOL/L (ref 21–32)
CREAT SERPL-MCNC: 1.01 MG/DL (ref 0.6–1.3)
EOSINOPHIL # BLD AUTO: 0.25 THOUSAND/ΜL (ref 0–0.61)
EOSINOPHIL NFR BLD AUTO: 5 % (ref 0–6)
ERYTHROCYTE [DISTWIDTH] IN BLOOD BY AUTOMATED COUNT: 12.9 % (ref 11.6–15.1)
GFR SERPL CREATININE-BSD FRML MDRD: 80 ML/MIN/1.73SQ M
GLUCOSE SERPL-MCNC: 93 MG/DL (ref 65–140)
HCT VFR BLD AUTO: 43.1 % (ref 36.5–49.3)
HGB BLD-MCNC: 14.3 G/DL (ref 12–17)
IMM GRANULOCYTES # BLD AUTO: 0.01 THOUSAND/UL (ref 0–0.2)
IMM GRANULOCYTES NFR BLD AUTO: 0 % (ref 0–2)
INR PPP: 0.97 (ref 0.84–1.19)
LYMPHOCYTES # BLD AUTO: 1.8 THOUSANDS/ΜL (ref 0.6–4.47)
LYMPHOCYTES NFR BLD AUTO: 36 % (ref 14–44)
MCH RBC QN AUTO: 30.4 PG (ref 26.8–34.3)
MCHC RBC AUTO-ENTMCNC: 33.2 G/DL (ref 31.4–37.4)
MCV RBC AUTO: 92 FL (ref 82–98)
MONOCYTES # BLD AUTO: 0.43 THOUSAND/ΜL (ref 0.17–1.22)
MONOCYTES NFR BLD AUTO: 9 % (ref 4–12)
NEUTROPHILS # BLD AUTO: 2.43 THOUSANDS/ΜL (ref 1.85–7.62)
NEUTS SEG NFR BLD AUTO: 49 % (ref 43–75)
NRBC BLD AUTO-RTO: 0 /100 WBCS
P AXIS: 48 DEGREES
PLATELET # BLD AUTO: 206 THOUSANDS/UL (ref 149–390)
PMV BLD AUTO: 9.4 FL (ref 8.9–12.7)
POTASSIUM SERPL-SCNC: 4.3 MMOL/L (ref 3.5–5.3)
PR INTERVAL: 166 MS
PROTHROMBIN TIME: 13 SECONDS (ref 11.6–14.5)
QRS AXIS: 30 DEGREES
QRSD INTERVAL: 88 MS
QT INTERVAL: 416 MS
QTC INTERVAL: 401 MS
RBC # BLD AUTO: 4.71 MILLION/UL (ref 3.88–5.62)
RH BLD: POSITIVE
SODIUM SERPL-SCNC: 141 MMOL/L (ref 136–145)
SPECIMEN EXPIRATION DATE: NORMAL
T WAVE AXIS: 24 DEGREES
VENTRICULAR RATE: 56 BPM
WBC # BLD AUTO: 4.99 THOUSAND/UL (ref 4.31–10.16)

## 2019-08-23 PROCEDURE — 93005 ELECTROCARDIOGRAM TRACING: CPT | Performed by: UROLOGY

## 2019-08-23 PROCEDURE — 93010 ELECTROCARDIOGRAM REPORT: CPT | Performed by: INTERNAL MEDICINE

## 2019-08-23 PROCEDURE — 85025 COMPLETE CBC W/AUTO DIFF WBC: CPT

## 2019-08-23 PROCEDURE — 85610 PROTHROMBIN TIME: CPT

## 2019-08-23 PROCEDURE — 85730 THROMBOPLASTIN TIME PARTIAL: CPT

## 2019-08-23 PROCEDURE — 86900 BLOOD TYPING SEROLOGIC ABO: CPT

## 2019-08-23 PROCEDURE — 36415 COLL VENOUS BLD VENIPUNCTURE: CPT

## 2019-08-23 PROCEDURE — 86920 COMPATIBILITY TEST SPIN: CPT

## 2019-08-23 PROCEDURE — 86850 RBC ANTIBODY SCREEN: CPT

## 2019-08-23 PROCEDURE — 86901 BLOOD TYPING SEROLOGIC RH(D): CPT

## 2019-08-23 PROCEDURE — 80048 BASIC METABOLIC PNL TOTAL CA: CPT

## 2019-08-23 RX ORDER — OMEGA-3 FATTY ACIDS/FISH OIL 300-1000MG
CAPSULE ORAL
COMMUNITY
End: 2019-09-06 | Stop reason: HOSPADM

## 2019-08-23 NOTE — ANESTHESIA PREPROCEDURE EVALUATION
Review of Systems/Medical History  Patient summary reviewed  Chart reviewed  History of anesthetic complications PONV    Cardiovascular  Hyperlipidemia,    Pulmonary  Negative pulmonary ROS        GI/Hepatic    GERD well controlled,        Prostatic disorder, history of prostate cancer       Endo/Other     GYN       Hematology  Negative hematology ROS      Musculoskeletal  Back pain (HCD) , cervical pain,   Arthritis     Neurology  Negative neurology ROS      Psychology   Negative psychology ROS              Physical Exam    Airway    Mallampati score: II  TM Distance: >3 FB  Neck ROM: full     Dental   No notable dental hx     Cardiovascular  Rhythm: regular, Rate: normal, Cardiovascular exam normal    Pulmonary  Pulmonary exam normal Breath sounds clear to auscultation,     Other Findings        Anesthesia Plan  ASA Score- 2     Anesthesia Type- general with ASA Monitors  Additional Monitors:   Airway Plan: ETT  Plan Factors-Patient not instructed to abstain from smoking on day of procedure  Patient did not smoke on day of surgery  Induction- intravenous  Postoperative Plan-     Informed Consent- Anesthetic plan and risks discussed with patient  I personally reviewed this patient with the CRNA  Discussed and agreed on the Anesthesia Plan with the CRNA  Kamryn Alford

## 2019-08-23 NOTE — PRE-PROCEDURE INSTRUCTIONS
Pre-Surgery Instructions:   Medication Instructions    Ibuprofen 200 MG CAPS Patient was instructed by Physician and understands  Patient seen by Carter Son instructed   Patient given/ instructed on use of chlorhexidine soap per hospital protocol    Patient instructed to stop all ASA, NSAIDS, vitamins and herbal supplements one week prior to surgery or per Dr Alvarez Seo

## 2019-08-26 ENCOUNTER — TELEPHONE (OUTPATIENT)
Dept: UROLOGY | Facility: AMBULATORY SURGERY CENTER | Age: 64
End: 2019-08-26

## 2019-08-26 NOTE — TELEPHONE ENCOUNTER
Zach Ferrara is scheduled for RALP with Dr Mony Callaway on 9/4/19  Called him  L/M on home phone and cell phone requesting a call back  Left my direct extension

## 2019-08-28 ENCOUNTER — TELEPHONE (OUTPATIENT)
Dept: UROLOGY | Facility: CLINIC | Age: 64
End: 2019-08-28

## 2019-08-28 ENCOUNTER — TELEPHONE (OUTPATIENT)
Dept: UROLOGY | Facility: MEDICAL CENTER | Age: 64
End: 2019-08-28

## 2019-08-28 NOTE — TELEPHONE ENCOUNTER
Patient of Dr Mandy Moreno seen in the [facility] office  Patient wife called and advised that 9/20/19 did not work for her because she has to drive the patient  I advised patient wife that I only see that the location changed not the date  She stated that she spoke to someone this morning  Did not see a note in epic  Please advise

## 2019-08-28 NOTE — TELEPHONE ENCOUNTER
I called and spoke to patients wife Salomón Geller in regards to rescheduling his post op/ralp/blnd that was scheduled at [facility] on 9/16th  I explained to her that due to a schedule change, Dr Ho Oscar will be in the OR that day and we would like to reschedule this appointment to 9/20/19 at 1:45  Patients wife became very angry and stated that her FMLA runs out on 9/16/19 and that she is unable to extend her FMLA  I explained to her that I would see what else we could do to accomodate this appointment  Please advise  Thank you

## 2019-08-28 NOTE — TELEPHONE ENCOUNTER
Spoke with patient and he has agreed on 9/10/19 at 2:30, also aware that appointment is at the [facility]

## 2019-08-28 NOTE — TELEPHONE ENCOUNTER
Can consider 9/10 at 2:30 with me  Pathology should be back  Patient may need an RN appointment closer to 9/16 for low removal depending on how surgery goes  His wife would not necessarily need to be present for that   Thanks

## 2019-09-04 ENCOUNTER — APPOINTMENT (OUTPATIENT)
Dept: ULTRASOUND IMAGING | Facility: HOSPITAL | Age: 64
End: 2019-09-04
Payer: COMMERCIAL

## 2019-09-04 ENCOUNTER — ANESTHESIA (OUTPATIENT)
Dept: PERIOP | Facility: HOSPITAL | Age: 64
End: 2019-09-04
Payer: COMMERCIAL

## 2019-09-04 ENCOUNTER — APPOINTMENT (OUTPATIENT)
Dept: RADIOLOGY | Facility: HOSPITAL | Age: 64
End: 2019-09-04
Payer: COMMERCIAL

## 2019-09-04 ENCOUNTER — HOSPITAL ENCOUNTER (OUTPATIENT)
Facility: HOSPITAL | Age: 64
Setting detail: OBSERVATION
Discharge: HOME | End: 2019-09-06
Attending: UROLOGY | Admitting: UROLOGY
Payer: COMMERCIAL

## 2019-09-04 DIAGNOSIS — Z09 FOLLOW UP: ICD-10-CM

## 2019-09-04 DIAGNOSIS — C61 PROSTATE CANCER (HCC): ICD-10-CM

## 2019-09-04 PROBLEM — Q62.5 DUPLICATED LEFT RENAL COLLECTING SYSTEM: Status: ACTIVE | Noted: 2019-09-04

## 2019-09-04 PROBLEM — Q62.63: Status: ACTIVE | Noted: 2019-09-04

## 2019-09-04 LAB
ANION GAP SERPL CALCULATED.3IONS-SCNC: 13 MMOL/L (ref 4–13)
BUN SERPL-MCNC: 21 MG/DL (ref 5–25)
CALCIUM SERPL-MCNC: 7.9 MG/DL (ref 8.3–10.1)
CHLORIDE SERPL-SCNC: 105 MMOL/L (ref 100–108)
CO2 SERPL-SCNC: 22 MMOL/L (ref 21–32)
CREAT SERPL-MCNC: 1.56 MG/DL (ref 0.6–1.3)
ERYTHROCYTE [DISTWIDTH] IN BLOOD BY AUTOMATED COUNT: 12.9 % (ref 11.6–15.1)
GFR SERPL CREATININE-BSD FRML MDRD: 47 ML/MIN/1.73SQ M
GLUCOSE P FAST SERPL-MCNC: 164 MG/DL (ref 65–99)
GLUCOSE SERPL-MCNC: 164 MG/DL (ref 65–140)
HCT VFR BLD AUTO: 41.5 % (ref 36.5–49.3)
HGB BLD-MCNC: 13.6 G/DL (ref 12–17)
MCH RBC QN AUTO: 30.7 PG (ref 26.8–34.3)
MCHC RBC AUTO-ENTMCNC: 32.8 G/DL (ref 31.4–37.4)
MCV RBC AUTO: 94 FL (ref 82–98)
PLATELET # BLD AUTO: 226 THOUSANDS/UL (ref 149–390)
PMV BLD AUTO: 9.2 FL (ref 8.9–12.7)
POTASSIUM SERPL-SCNC: 4.6 MMOL/L (ref 3.5–5.3)
RBC # BLD AUTO: 4.43 MILLION/UL (ref 3.88–5.62)
SODIUM SERPL-SCNC: 140 MMOL/L (ref 136–145)
WBC # BLD AUTO: 18.67 THOUSAND/UL (ref 4.31–10.16)

## 2019-09-04 PROCEDURE — 55866 LAPS SURG PRST8ECT RPBIC RAD: CPT | Performed by: PHYSICIAN ASSISTANT

## 2019-09-04 PROCEDURE — NC001 PR NO CHARGE: Performed by: UROLOGY

## 2019-09-04 PROCEDURE — 50780 REIMPLANT URETER IN BLADDER: CPT | Performed by: UROLOGY

## 2019-09-04 PROCEDURE — 38571 LAPAROSCOPY LYMPHADENECTOMY: CPT | Performed by: UROLOGY

## 2019-09-04 PROCEDURE — 88342 IMHCHEM/IMCYTCHM 1ST ANTB: CPT | Performed by: PATHOLOGY

## 2019-09-04 PROCEDURE — 85027 COMPLETE CBC AUTOMATED: CPT | Performed by: UROLOGY

## 2019-09-04 PROCEDURE — 88309 TISSUE EXAM BY PATHOLOGIST: CPT | Performed by: PATHOLOGY

## 2019-09-04 PROCEDURE — 38571 LAPAROSCOPY LYMPHADENECTOMY: CPT | Performed by: PHYSICIAN ASSISTANT

## 2019-09-04 PROCEDURE — 88304 TISSUE EXAM BY PATHOLOGIST: CPT | Performed by: PATHOLOGY

## 2019-09-04 PROCEDURE — 55866 LAPS SURG PRST8ECT RPBIC RAD: CPT | Performed by: UROLOGY

## 2019-09-04 PROCEDURE — 88305 TISSUE EXAM BY PATHOLOGIST: CPT | Performed by: PATHOLOGY

## 2019-09-04 PROCEDURE — C1769 GUIDE WIRE: HCPCS | Performed by: UROLOGY

## 2019-09-04 PROCEDURE — 74420 UROGRAPHY RTRGR +-KUB: CPT

## 2019-09-04 PROCEDURE — C2617 STENT, NON-COR, TEM W/O DEL: HCPCS | Performed by: UROLOGY

## 2019-09-04 PROCEDURE — 80048 BASIC METABOLIC PNL TOTAL CA: CPT | Performed by: UROLOGY

## 2019-09-04 PROCEDURE — NC001 PR NO CHARGE: Performed by: PHYSICIAN ASSISTANT

## 2019-09-04 DEVICE — INLAY OPTIMA URETERAL STENT W/O GUIDEWIRE
Type: IMPLANTABLE DEVICE | Site: URETER | Status: FUNCTIONAL
Brand: BARD® INLAY OPTIMA® URETERAL STENT

## 2019-09-04 RX ORDER — VECURONIUM BROMIDE 1 MG/ML
INJECTION, POWDER, LYOPHILIZED, FOR SOLUTION INTRAVENOUS AS NEEDED
Status: DISCONTINUED | OUTPATIENT
Start: 2019-09-04 | End: 2019-09-04 | Stop reason: SURG

## 2019-09-04 RX ORDER — HYDROCODONE BITARTRATE AND ACETAMINOPHEN 5; 325 MG/1; MG/1
2 TABLET ORAL EVERY 4 HOURS PRN
Qty: 30 TABLET | Refills: 0 | Status: SHIPPED | OUTPATIENT
Start: 2019-09-04 | End: 2019-10-04

## 2019-09-04 RX ORDER — HYDROCODONE BITARTRATE AND ACETAMINOPHEN 5; 325 MG/1; MG/1
1 TABLET ORAL EVERY 4 HOURS PRN
Status: DISCONTINUED | OUTPATIENT
Start: 2019-09-04 | End: 2019-09-06 | Stop reason: HOSPADM

## 2019-09-04 RX ORDER — ONDANSETRON 2 MG/ML
INJECTION INTRAMUSCULAR; INTRAVENOUS AS NEEDED
Status: DISCONTINUED | OUTPATIENT
Start: 2019-09-04 | End: 2019-09-04 | Stop reason: SURG

## 2019-09-04 RX ORDER — PROPOFOL 10 MG/ML
INJECTION, EMULSION INTRAVENOUS AS NEEDED
Status: DISCONTINUED | OUTPATIENT
Start: 2019-09-04 | End: 2019-09-04 | Stop reason: SURG

## 2019-09-04 RX ORDER — CLINDAMYCIN PHOSPHATE 600 MG/50ML
600 INJECTION INTRAVENOUS EVERY 8 HOURS
Status: COMPLETED | OUTPATIENT
Start: 2019-09-05 | End: 2019-09-05

## 2019-09-04 RX ORDER — DEXAMETHASONE SODIUM PHOSPHATE 4 MG/ML
INJECTION, SOLUTION INTRA-ARTICULAR; INTRALESIONAL; INTRAMUSCULAR; INTRAVENOUS; SOFT TISSUE AS NEEDED
Status: DISCONTINUED | OUTPATIENT
Start: 2019-09-04 | End: 2019-09-04 | Stop reason: SURG

## 2019-09-04 RX ORDER — CLINDAMYCIN PHOSPHATE 900 MG/50ML
900 INJECTION INTRAVENOUS EVERY 8 HOURS
Status: DISCONTINUED | OUTPATIENT
Start: 2019-09-04 | End: 2019-09-04 | Stop reason: HOSPADM

## 2019-09-04 RX ORDER — SODIUM CHLORIDE 9 MG/ML
INJECTION, SOLUTION INTRAVENOUS CONTINUOUS PRN
Status: DISCONTINUED | OUTPATIENT
Start: 2019-09-04 | End: 2019-09-04 | Stop reason: SURG

## 2019-09-04 RX ORDER — NEOSTIGMINE METHYLSULFATE 1 MG/ML
INJECTION INTRAVENOUS AS NEEDED
Status: DISCONTINUED | OUTPATIENT
Start: 2019-09-04 | End: 2019-09-04 | Stop reason: SURG

## 2019-09-04 RX ORDER — SODIUM CHLORIDE 9 MG/ML
125 INJECTION, SOLUTION INTRAVENOUS CONTINUOUS
Status: DISCONTINUED | OUTPATIENT
Start: 2019-09-04 | End: 2019-09-04 | Stop reason: HOSPADM

## 2019-09-04 RX ORDER — MIDAZOLAM HYDROCHLORIDE 1 MG/ML
INJECTION INTRAMUSCULAR; INTRAVENOUS AS NEEDED
Status: DISCONTINUED | OUTPATIENT
Start: 2019-09-04 | End: 2019-09-04 | Stop reason: SURG

## 2019-09-04 RX ORDER — HYDROMORPHONE HCL/PF 1 MG/ML
0.5 SYRINGE (ML) INJECTION
Status: DISCONTINUED | OUTPATIENT
Start: 2019-09-04 | End: 2019-09-04 | Stop reason: HOSPADM

## 2019-09-04 RX ORDER — FENTANYL CITRATE/PF 50 MCG/ML
50 SYRINGE (ML) INJECTION
Status: DISCONTINUED | OUTPATIENT
Start: 2019-09-04 | End: 2019-09-04 | Stop reason: HOSPADM

## 2019-09-04 RX ORDER — MAGNESIUM HYDROXIDE 1200 MG/15ML
LIQUID ORAL AS NEEDED
Status: DISCONTINUED | OUTPATIENT
Start: 2019-09-04 | End: 2019-09-04 | Stop reason: HOSPADM

## 2019-09-04 RX ORDER — EPHEDRINE SULFATE 50 MG/ML
INJECTION INTRAVENOUS AS NEEDED
Status: DISCONTINUED | OUTPATIENT
Start: 2019-09-04 | End: 2019-09-04 | Stop reason: SURG

## 2019-09-04 RX ORDER — GLYCOPYRROLATE 0.2 MG/ML
INJECTION INTRAMUSCULAR; INTRAVENOUS AS NEEDED
Status: DISCONTINUED | OUTPATIENT
Start: 2019-09-04 | End: 2019-09-04 | Stop reason: SURG

## 2019-09-04 RX ORDER — ATROPA BELLADONNA AND OPIUM 16.2; 3 MG/1; MG/1
SUPPOSITORY RECTAL AS NEEDED
Status: DISCONTINUED | OUTPATIENT
Start: 2019-09-04 | End: 2019-09-04 | Stop reason: HOSPADM

## 2019-09-04 RX ORDER — FENTANYL CITRATE 50 UG/ML
INJECTION, SOLUTION INTRAMUSCULAR; INTRAVENOUS AS NEEDED
Status: DISCONTINUED | OUTPATIENT
Start: 2019-09-04 | End: 2019-09-04 | Stop reason: SURG

## 2019-09-04 RX ORDER — SODIUM CHLORIDE, SODIUM LACTATE, POTASSIUM CHLORIDE, CALCIUM CHLORIDE 600; 310; 30; 20 MG/100ML; MG/100ML; MG/100ML; MG/100ML
100 INJECTION, SOLUTION INTRAVENOUS CONTINUOUS
Status: DISCONTINUED | OUTPATIENT
Start: 2019-09-04 | End: 2019-09-06 | Stop reason: HOSPADM

## 2019-09-04 RX ORDER — CLINDAMYCIN PHOSPHATE 150 MG/ML
600 INJECTION, SOLUTION INTRAVENOUS EVERY 8 HOURS
Status: DISCONTINUED | OUTPATIENT
Start: 2019-09-04 | End: 2019-09-04

## 2019-09-04 RX ORDER — HYDROMORPHONE HCL/PF 1 MG/ML
SYRINGE (ML) INJECTION AS NEEDED
Status: DISCONTINUED | OUTPATIENT
Start: 2019-09-04 | End: 2019-09-04 | Stop reason: SURG

## 2019-09-04 RX ORDER — OXYBUTYNIN CHLORIDE 5 MG/1
5 TABLET, EXTENDED RELEASE ORAL DAILY
Qty: 14 TABLET | Refills: 0 | Status: SHIPPED | OUTPATIENT
Start: 2019-09-05 | End: 2019-10-29

## 2019-09-04 RX ORDER — BUPIVACAINE HYDROCHLORIDE 5 MG/ML
INJECTION, SOLUTION PERINEURAL AS NEEDED
Status: DISCONTINUED | OUTPATIENT
Start: 2019-09-04 | End: 2019-09-04 | Stop reason: HOSPADM

## 2019-09-04 RX ORDER — INDOCYANINE GREEN AND WATER 25 MG
KIT INJECTION AS NEEDED
Status: DISCONTINUED | OUTPATIENT
Start: 2019-09-04 | End: 2019-09-04 | Stop reason: HOSPADM

## 2019-09-04 RX ORDER — ONDANSETRON 2 MG/ML
4 INJECTION INTRAMUSCULAR; INTRAVENOUS ONCE AS NEEDED
Status: DISCONTINUED | OUTPATIENT
Start: 2019-09-04 | End: 2019-09-04 | Stop reason: HOSPADM

## 2019-09-04 RX ORDER — OXYBUTYNIN CHLORIDE 5 MG/1
5 TABLET, EXTENDED RELEASE ORAL DAILY
Status: DISCONTINUED | OUTPATIENT
Start: 2019-09-05 | End: 2019-09-06 | Stop reason: HOSPADM

## 2019-09-04 RX ORDER — ONDANSETRON 2 MG/ML
4 INJECTION INTRAMUSCULAR; INTRAVENOUS EVERY 6 HOURS PRN
Status: DISCONTINUED | OUTPATIENT
Start: 2019-09-04 | End: 2019-09-06 | Stop reason: HOSPADM

## 2019-09-04 RX ORDER — DOCUSATE SODIUM 100 MG/1
100 CAPSULE, LIQUID FILLED ORAL 2 TIMES DAILY
Qty: 60 CAPSULE | Refills: 0 | Status: SHIPPED | OUTPATIENT
Start: 2019-09-04 | End: 2020-12-21

## 2019-09-04 RX ORDER — HYDROCODONE BITARTRATE AND ACETAMINOPHEN 5; 325 MG/1; MG/1
2 TABLET ORAL EVERY 4 HOURS PRN
Status: DISCONTINUED | OUTPATIENT
Start: 2019-09-04 | End: 2019-09-06 | Stop reason: HOSPADM

## 2019-09-04 RX ORDER — HYDROMORPHONE HCL/PF 1 MG/ML
0.5 SYRINGE (ML) INJECTION EVERY 2 HOUR PRN
Status: DISCONTINUED | OUTPATIENT
Start: 2019-09-04 | End: 2019-09-06 | Stop reason: HOSPADM

## 2019-09-04 RX ORDER — SIMETHICONE 80 MG
80 TABLET,CHEWABLE ORAL 4 TIMES DAILY PRN
Status: DISCONTINUED | OUTPATIENT
Start: 2019-09-04 | End: 2019-09-06 | Stop reason: HOSPADM

## 2019-09-04 RX ORDER — DOCUSATE SODIUM 100 MG/1
100 CAPSULE, LIQUID FILLED ORAL 2 TIMES DAILY
Status: DISCONTINUED | OUTPATIENT
Start: 2019-09-04 | End: 2019-09-06 | Stop reason: HOSPADM

## 2019-09-04 RX ADMIN — VECURONIUM BROMIDE 2 MG: 1 INJECTION, POWDER, LYOPHILIZED, FOR SOLUTION INTRAVENOUS at 14:02

## 2019-09-04 RX ADMIN — CLINDAMYCIN PHOSPHATE 900 MG: 900 INJECTION, SOLUTION INTRAVENOUS at 18:23

## 2019-09-04 RX ADMIN — CLINDAMYCIN PHOSPHATE 900 MG: 900 INJECTION, SOLUTION INTRAVENOUS at 12:27

## 2019-09-04 RX ADMIN — VECURONIUM BROMIDE 2 MG: 1 INJECTION, POWDER, LYOPHILIZED, FOR SOLUTION INTRAVENOUS at 15:11

## 2019-09-04 RX ADMIN — FENTANYL CITRATE 100 MCG: 50 INJECTION, SOLUTION INTRAMUSCULAR; INTRAVENOUS at 12:34

## 2019-09-04 RX ADMIN — DOCUSATE SODIUM 100 MG: 100 CAPSULE, LIQUID FILLED ORAL at 21:21

## 2019-09-04 RX ADMIN — FENTANYL CITRATE 50 MCG: 50 INJECTION, SOLUTION INTRAMUSCULAR; INTRAVENOUS at 14:07

## 2019-09-04 RX ADMIN — EPHEDRINE SULFATE 10 MG: 50 INJECTION, SOLUTION INTRAVENOUS at 13:45

## 2019-09-04 RX ADMIN — HYDROMORPHONE HYDROCHLORIDE 0.5 MG: 1 INJECTION, SOLUTION INTRAMUSCULAR; INTRAVENOUS; SUBCUTANEOUS at 16:34

## 2019-09-04 RX ADMIN — VECURONIUM BROMIDE 7 MG: 1 INJECTION, POWDER, LYOPHILIZED, FOR SOLUTION INTRAVENOUS at 12:34

## 2019-09-04 RX ADMIN — HYDROMORPHONE HYDROCHLORIDE 0.5 MG: 1 INJECTION, SOLUTION INTRAMUSCULAR; INTRAVENOUS; SUBCUTANEOUS at 15:08

## 2019-09-04 RX ADMIN — SODIUM CHLORIDE: 0.9 INJECTION, SOLUTION INTRAVENOUS at 17:27

## 2019-09-04 RX ADMIN — VECURONIUM BROMIDE 2 MG: 1 INJECTION, POWDER, LYOPHILIZED, FOR SOLUTION INTRAVENOUS at 14:38

## 2019-09-04 RX ADMIN — METHYLENE BLUE 10 ML: 5 INJECTION INTRAVENOUS at 14:48

## 2019-09-04 RX ADMIN — HYDROMORPHONE HYDROCHLORIDE 0.5 MG: 1 INJECTION, SOLUTION INTRAMUSCULAR; INTRAVENOUS; SUBCUTANEOUS at 18:17

## 2019-09-04 RX ADMIN — VECURONIUM BROMIDE 2 MG: 1 INJECTION, POWDER, LYOPHILIZED, FOR SOLUTION INTRAVENOUS at 15:48

## 2019-09-04 RX ADMIN — PROPOFOL 200 MG: 10 INJECTION, EMULSION INTRAVENOUS at 12:34

## 2019-09-04 RX ADMIN — PHENYLEPHRINE HYDROCHLORIDE 100 MCG: 10 INJECTION INTRAVENOUS at 15:43

## 2019-09-04 RX ADMIN — LIDOCAINE HYDROCHLORIDE 100 MG: 20 INJECTION, SOLUTION INTRAVENOUS at 12:34

## 2019-09-04 RX ADMIN — ONDANSETRON 4 MG: 2 INJECTION INTRAMUSCULAR; INTRAVENOUS at 18:31

## 2019-09-04 RX ADMIN — ONDANSETRON 4 MG: 2 INJECTION INTRAMUSCULAR; INTRAVENOUS at 20:40

## 2019-09-04 RX ADMIN — MIDAZOLAM 2 MG: 1 INJECTION INTRAMUSCULAR; INTRAVENOUS at 12:23

## 2019-09-04 RX ADMIN — VECURONIUM BROMIDE 2 MG: 1 INJECTION, POWDER, LYOPHILIZED, FOR SOLUTION INTRAVENOUS at 13:23

## 2019-09-04 RX ADMIN — FENTANYL CITRATE 50 MCG: 50 INJECTION, SOLUTION INTRAMUSCULAR; INTRAVENOUS at 14:00

## 2019-09-04 RX ADMIN — DEXAMETHASONE SODIUM PHOSPHATE 4 MG: 4 INJECTION, SOLUTION INTRAMUSCULAR; INTRAVENOUS at 12:34

## 2019-09-04 RX ADMIN — GLYCOPYRROLATE 0.6 MG: 0.2 INJECTION INTRAMUSCULAR; INTRAVENOUS at 18:37

## 2019-09-04 RX ADMIN — HYDROCODONE BITARTRATE AND ACETAMINOPHEN 1 TABLET: 5; 325 TABLET ORAL at 21:24

## 2019-09-04 RX ADMIN — ONDANSETRON 4 MG: 2 INJECTION INTRAMUSCULAR; INTRAVENOUS at 16:00

## 2019-09-04 RX ADMIN — SODIUM CHLORIDE: 0.9 INJECTION, SOLUTION INTRAVENOUS at 12:41

## 2019-09-04 RX ADMIN — NEOSTIGMINE METHYLSULFATE 4 MG: 1 INJECTION, SOLUTION INTRAVENOUS at 18:37

## 2019-09-04 RX ADMIN — VECURONIUM BROMIDE 2 MG: 1 INJECTION, POWDER, LYOPHILIZED, FOR SOLUTION INTRAVENOUS at 16:34

## 2019-09-04 RX ADMIN — SODIUM CHLORIDE 125 ML/HR: 0.9 INJECTION, SOLUTION INTRAVENOUS at 11:15

## 2019-09-04 RX ADMIN — SODIUM CHLORIDE, SODIUM LACTATE, POTASSIUM CHLORIDE, AND CALCIUM CHLORIDE 150 ML/HR: .6; .31; .03; .02 INJECTION, SOLUTION INTRAVENOUS at 22:32

## 2019-09-04 RX ADMIN — HYDROMORPHONE HYDROCHLORIDE 0.5 MG: 1 INJECTION, SOLUTION INTRAMUSCULAR; INTRAVENOUS; SUBCUTANEOUS at 14:38

## 2019-09-04 RX ADMIN — PHENYLEPHRINE HYDROCHLORIDE 100 MCG: 10 INJECTION INTRAVENOUS at 16:16

## 2019-09-04 NOTE — H&P
UROLOGY PREOPERATIVE H&P NOTE     CHIEF COMPLAINT   Flynn Loyola is a 66 y o  male with a complaint of prostate cancer      History of Present Illness:     66 y o  male who recently underwent PSA testing an outside urologist PSA was elevated at 10  Repeat PSA karen to 13  Patient denies any significant urinary symptoms of infection or inflammation  He denies a family history of prostate cancer although he thinks his father may have had some type of prostate procedure  No prostate cancer in his brothers  Presents today for urgent evaluation  Of note, his daughter is a [employer] [facility] recovery room nurse and his wife is also an RN  Lab Results   Component Value Date    PSA 13 4 (H) 04/26/2019    PSA 10 1 (H) 01/09/2019     AUA SS 7, LYLE 19  Uses Viagra 100 mg    Patient was previously scheduled for biopsy would either due to an acute GI illness or reaction to the antibiotics, the patient had vomiting and diarrhea the day leading into his procedure  We reschedule the procedure with pre-administration of IM gentamycin  Patient does have intermediate repair risk prostate cancer  He underwent a CT of the pelvis and returns for results  He was referred to Radiation Oncology but is interested in moving forward with surgical therapy  Patient delayed surgery due to work constraints through the summer  Did seek out appointment with pelvic floor physical therapy preoperatively  No new health changes or concerns      Past Medical History:     Past Medical History:   Diagnosis Date    Arthritis     mostly hands    Bladder stone     Blue baby     at birth /with heart murmur/no longer noted    Carpal tunnel syndrome     Chronic pain disorder     "hands"    Generalized joint pain     GERD (gastroesophageal reflux disease)     "silent"    Herniated disc, cervical     some neck pain    History of kidney stones     [place] (hard of hearing[place]     "slightly"    Hyperlipidemia     Moderate exercise     "very active lifestyle/walking/at least 4 miles per day/active job"    PONV (postoperative nausea and vomiting)     "almost every time after surgery"    Prostate cancer ([address] )     Rosacea     Skin cancer     Tinnitus        PAST SURGICAL HISTORY:     Past Surgical History:   Procedure Laterality Date    BACK SURGERY      HERNIATED DISC/Lumbar    CARPAL TUNNEL RELEASE Bilateral     COLONOSCOPY      HERNIA REPAIR Right     inguinal    NEUROPLASTY / TRANSPOSITION MEDIAN NERVE AT CARPAL TUNNEL BILATERAL      PROSTATE BIOPSY      SKIN LESION EXCISION      x2 on nose for melanoma    TONSILLECTOMY         CURRENT MEDICATIONS:     Current Facility-Administered Medications   Medication Dose Route Frequency Provider Last Rate Last Dose    clindamycin (CLEOCIN) IVPB (premix) 900 mg  900 mg Intravenous Q8H Rosina Brar MD        sodium chloride 0 9 % infusion  125 mL/hr Intravenous Continuous Sheng Quintana  mL/hr at 09/04/19 1115 125 mL/hr at 09/04/19 1115       ALLERGIES:     Allergies   Allergen Reactions    Ciprofloxacin GI Intolerance    Keflex [Cephalexin] GI Intolerance       SOCIAL HISTORY:     Social History     Socioeconomic History    Marital status: /Civil Union     Spouse name: None    Number of children: None    Years of education: None    Highest education level: None   Occupational History    Occupation: EMPLOYED   Social Needs    Financial resource strain: None    Food insecurity:     Worry: None     Inability: None    Transportation needs:     Medical: None     Non-medical: None   Tobacco Use    Smoking status: Never Smoker    Smokeless tobacco: Never Used   Substance and Sexual Activity    Alcohol use:  Yes     Alcohol/week: 1 0 standard drinks     Types: 1 Glasses of wine per week     Frequency: 2-4 times a month    Drug use: No    Sexual activity: None   Lifestyle    Physical activity:     Days per week: None     Minutes per session: None    Stress: None Relationships    Social connections:     Talks on phone: None     Gets together: None     Attends Protestant service: None     Active member of club or organization: None     Attends meetings of clubs or organizations: None     Relationship status: None    Intimate partner violence:     Fear of current or ex partner: None     Emotionally abused: None     Physically abused: None     Forced sexual activity: None   Other Topics Concern    None   Social History Narrative        EMPLOYED       SOCIAL HISTORY:     Family History   Problem Relation Age of Onset    Hypertension Mother     Stroke Father        REVIEW OF SYSTEMS:     Review of Systems   Constitutional: Negative  Respiratory: Negative  Cardiovascular: Negative  Gastrointestinal: Negative  Genitourinary: Negative  Musculoskeletal: Negative  Skin: Negative  Psychiatric/Behavioral: Negative  PHYSICAL EXAM:     /81   Pulse 63   Temp 98 1 °F (36 7 °C) (Temporal)   Resp 16   Ht 5' 10" (1 778 m)   Wt 88 5 kg (195 lb)   SpO2 97%   BMI 27 98 kg/m²     General:  Healthy appearing male in no acute distress  They have a normal affect  There is not appear to be any gross neurologic defects or abnormalities  HEENT:  Normocephalic, atraumatic  Neck is supple without any palpable lymphadenopathy  Cardiovascular:  Patient has normal palpable distal radial pulses  There is no significant peripheral edema  No JVD is noted  Regular rate and rhythm  Respiratory:  Patient has unlabored respirations  There is no audible wheeze or rhonchi  Clear to auscultation  Abdomen:  Abdomen is without surgical scars  Abdomen is soft and nontender  There is no tympany  Inguinal and umbilical hernia are not appreciated  Musculoskeletal:  Patient does not have significant CVA tenderness in the  flank with palpation or percussion  They full range of motion in all 4 extremities  Strength in all 4 extremities appears congruent  Patient is able to ambulate without assistance or difficulty  Dermatologic:  Patient has no skin abnormalities or rashes  LABS:     CBC:   Lab Results   Component Value Date    WBC 4 99 2019    HGB 14 3 2019    HCT 43 1 2019    MCV 92 2019     2019       BMP:   Lab Results   Component Value Date    CALCIUM 9 1 2019    K 4 3 2019    CO2 28 2019     2019    BUN 20 2019    CREATININE 1 01 2019     Lab Results   Component Value Date    PSA 13 4 (H) 2019    PSA 10 1 (H) 2019     IMAGIN/25/19  CT PELVIS WITH IV CONTRAST     INDICATION:   C61: Malignant neoplasm of prostate      COMPARISON:  None      TECHNIQUE: CT examination of the pelvis was performed  Axial, sagittal, and coronal 2D reformatted images were created from the source data and submitted for interpretation      Radiation dose length product (DLP) for this visit:  434 2 mGy-cm   This examination, like all CT scans performed in the [facility], was performed utilizing techniques to minimize radiation dose exposure, including the use of iterative   reconstruction and automated exposure control      IV Contrast:  100 mL of iohexol (OMNIPAQUE)  Enteric Contrast:  Enteric contrast was administered       FINDINGS:     REPRODUCTIVE ORGANS:  Prostamegaly protruding into the bladder base      URINARY BLADDER:  4 mm right bladder calculus     APPENDIX:  No findings to suggest appendicitis      VISUALIZED BOWEL:  Unremarkable      ABDOMINOPELVIC CAVITY:  No ascites or free intraperitoneal air  No lymphadenopathy  VISUALIZED VESSELS:  Unremarkable for patient's age  ABDOMINOPELVIC WALL/INGUINAL REGIONS: Fat-containing left inguinal hernia     OSSEOUS STRUCTURES:  No acute fracture or destructive osseous lesion      IMPRESSION:     No signs of metastatic disease to the pelvis      Prostamegaly      4 mm right bladder calculus      PATHOLOGY: 6/10/19 - TRUS 38 5g  Final Diagnosis   A  Prostate, left lateral base, needle core biopsy:  - Prostatic adenocarcinoma, [place] grade 3 + 4 = 7 (70% pattern 3 and 30% pattern 4, Grade group 2), two small foci of cancer, involving one of one core, 30% of the core, and spanning 80% of the core discontinuously  - Perineural invasion is present      B  Prostate,  left lateral mid, needle core biopsy:  - Prostatic adenocarcinoma, [place] grade 3 + 3 = 6 (Grade group 1), involving one of one core and 10% of the tissue   - Focal high-grade prostatic intraepithelial neoplasia (HGPIN)  - Perineural invasion is absent  C  Prostate,  left lateral apex, needle core biopsy:  - Prostatic adenocarcinoma, Larry grade 3 + 4 = 7 (80% pattern 3 and 20% pattern 4, Grade group 2), involving one of one core and 20% of the tissue   - Perineural invasion is present      D  Prostate,  left medial base, needle core biopsy:  - Benign prostate glands and stroma      E  Prostate,  left medial mid, needle core biopsy:  - Prostatic adenocarcinoma, [place] grade 3 + 4 = 7 (90% pattern 3 and 10% pattern 4, Grade group 2), involving one of two cores and 70% of the involved core  - Perineural invasion is present  F  Prostate,  left medial apex, needle core biopsy:  - Prostatic adenocarcinoma, [place] grade 3 + 3 = 6 (Grade group 1), involving one of one core, 30% of the tissue,  and spanning 90% of the core discontinuously  - Perineural invasion is absent  G  Prostate, right lateral base, needle core biopsy:  - Benign prostate glands and stroma      H  Prostate, right lateral mid, needle core biopsy:  - Prostatic adenocarcinoma, [place] grade 3 + 4 = 7 (80% pattern 3 and 20% pattern 4, Grade group 2), involving one of one core and 5% of the tissue   - High-grade prostatic intraepithelial neoplasia (HGPIN)  - Perineural invasion is absent      I   Prostate, right lateral apex, needle core biopsy:  - Prostatic adenocarcinoma, [place] grade 3 + 3 = 6 (Grade group 1), involving one of one core and 30% of the tissue   - Perineural invasion is absent  J  Prostate, right medial base, needle core biopsy:  - Prostatic adenocarcinoma, [place] grade 3 + 3 = 6 (Grade group 1), involving one of one core and 5% of the tissue   - Focal high-grade prostatic intraepithelial neoplasia (HGPIN)  - Perineural invasion is absent  K  Prostate, right medial mid, needle core biopsy:  - Prostatic adenocarcinoma, [place] grade 3 + 4 = 7 (70% pattern 3 and 30% pattern 4, Grade group 2), discontinuously involving one of one core and 40% of the tissue   - Perineural invasion is absent      L  Prostate, right medial apex, needle core biopsy:  - Prostatic adenocarcinoma, [place] grade 3 + 4 = 7 (90% pattern 3 and 10% pattern 4, Grade group 2), involving one of one core and 40% of the tissue   - Perineural invasion is absent  PRETREATMENT NOMOGRAM:         ASSESSMENT:     66 y o  male with cT1c prostate adenocarcinoma, 10/12 cores positive, highest [place] score 3+4=7    PLAN:     Patient has decided to proceed with radical prostatectomy  He is scheduled on 9/4/2019  Previously, we reviewed the plan for the surgery, the inherent risks and benefits and the postoperative course  The patient did undergo prehabilitation with pelvic floor physical therapy  I discussed with the patient the plan for possible bilateral nerve-sparing should the planes be sufficient at the time of surgery, however my focus will be the right side where less lateral Intermediate Risk cancer was notes  Patient's primary goal is cancer control and so we discussed wide resection without nerve sparing if there is any concern  All questions have been answered and patient will proceed today with robotic assisted laparoscopic prostatectomy, bilateral pelvic lymphadenectomy, possible open

## 2019-09-04 NOTE — OP NOTE
OPERATIVE REPORT  PATIENT NAME: Ekaterina Velasco    :  1955  MRN: [id number]  Pt Location: [facility] OR ROOM 06    SURGERY DATE: 2019    Surgeon(s) and Role:     * Dominic Tolentino MD - Primary     * Cali Russell MD - Assisting     * Noy Gandhi PA-C - Assisting     * Dilma Welch MD - Observing    Preop Diagnosis:  Prostate cancer [facility]) [place]    Post-Op Diagnosis Codes:     * Prostate cancer ([facility] [address] ) [place]    Procedure(s) (LRB):  PROSTATECTOMY RADICAL W ROBOTICS; BILATERAL PELVIC LYMPH NODE DISSECTION LYSIS OF ADHESIONS Robotic; DOUBLE LEFT URETERAL STENT INSERTION with retrograde pyelogram, Instillation of ICG; left upper pole ureteral re-implantation Robotic (N/A)    Specimen(s):  ID Type Source Tests Collected by Time Destination   1 : DORSAL VEIN MARGIN Tissue Vein TISSUE EXAM Dominic Tolentino MD 2019 1643    2 : PERIPROSTATIC FAT AND LYMPH NODES Tissue Lymph Node TISSUE EXAM Dominic Tolentino MD 2019 1310    3 : Left Pelvic lymph node Tissue Lymph Node TISSUE EXAM Dominic Tolentino MD 2019 1701    4 : Right Pelvic lymph node  Tissue Lymph Node TISSUE EXAM Dominic Tolentino MD 2019 1706    5 : Prostate and bilateral seminal vesicles  Tissue Prostate TISSUE EXAM Dominic Tolentino MD 2019 1836        Estimated Blood Loss:   200 mL    Drains:  Urethral Catheter Latex 20 Fr  (Active)   Number of days: 0       Ureteral Drain/Stent Left ureter 6 Fr  (Active)   Number of days: 0       Ureteral Drain/Stent Left ureter 6 Fr  (Active)   Number of days: 0       Anesthesia Type:   Choice    Operative Indications:  Prostate cancer ([facility] [address] ) [C61]      Operative Findings:  1  Sigmoid adhesions  2  Large deep pelvic prostate  3  Posterior bladder neck incision, carried laterally with immediate gush of fluid with transection of structure eminating from the LEFT seminal vesicle   4  Stent placed in the the LEFT ureteral orifice, seen within the bladder  5   Stent placed in tubular structure dissected from the LEFT seminal vesicle   6  Attempt at instillation of methylene blue, intraoperative flat plate KUB and 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  7  Intraoperative C-ARM did demonstrate stents in upper and lower pole moiety, retrograde pyelogram of upper pole confirmed  8  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  9  ICG instilled thru 5F in the transected structure to confirm the correct ureter  10  LEFT upper pole moeity neoureterocystotomy    Complications:   None    Procedure and Technique:    Mahnaz Velasco is a 66 y o  y o  male with a history of Larry 3+ 4 equals 7 prostate cancer identified in bilateral prostate  Risk and benefits of da Winnie robot-assisted laparoscopic prostatectomy with bilateral pelvic lymph node dissection were discussed and reviewed  According to the NCCN guidelines, the patient underwent a CT scan of the pelvis due to concern for lymph node involvement on preoperative nomogram   This did not demonstrate abnormality  Informed consent was obtained and the patient was returned to the operating room on 9/4/2019  After the smooth induction of general endotracheal anesthesia, the patient was placed in a low lithotomy position  Venodyne boots were applied  Pressure points were padded  The patient was secured to the bed with padding, towels, and tape  Intravenous antibiotics were administered  A timeout was performed with all members of the operative team confirming the patient's identity and procedure to be performed  Digital rectal exam was performed and a belladonna and opium suppositories placed per rectum  An 8mm supra-umbilical skin incision was made  The skin was elevated  A Veress needle was utilized to create a pneumoperitoneum  A 8 mm Xi camera port was then placed  The patient was placed into steep Trendelenburg    2 additional working robotic ports were placed at the level of the umbilicus and approximately 4 fingerbreadths lateral to the umbilicus  An additional left lower quadrant robotic port was placed and a right mid quadrant 12 mm assistant port was placed  A 5 mm assistant port was placed in the right upper quadrant  The robot was docked  We identified the bladder neck by manipulating the Albarado catheter  A posterior peritoneotomy was created behind the bladder and beneath the prostate until denonvilles fascia was identified  I then identified the vasa deferentia  Bilateral vasa deferentia were dissected proximally and transected  I then used each vas as a handle to provide traction to dissect out the ipsilateral seminal vesicle  Both seminal vesicles were dissected to their tip  I then performed exclusive sharp dissection beneath the prostate creating a plane between the prostate and the rectum  The urachal structures were taken down  The bladder was dropped and the space of Retzius was developed  Fibrofatty tissue was cleaned from the dorsum of the prostate and the endopelvic fascia  A large superficial venous complex was identified on the dorsum of the prostate  This was taken with bipolar electro dissection  I then opened the endopelvic fascia bilaterally from base to apex first on the right and then on the left  I dropped the puboprostatic ligaments  I exposed the dorsal venous complex  Muscular fibers were swept off the dorsal venous complex to expose the notch between the DVC and the urethra  The dorsal venous complex was ligated with 0 Vicryl  Next a focused my attention on the prostatic vesical junction  Hot and cold scissor dissection was utilized to create a plane between the bladder and prostate until the Albarado catheter was identified in the midline  The balloon was deflated area the catheter was brought into the surgical field and placed on tension  The posterior bladder neck was taken with hot scissor dissection    At this point time, the previously dissected vas deferens and seminal vesicles were elevated anteriorly into the field  As we mobilized laterally on the left seminal vesicle, there was a gush of clear fluid  This did not appear to be seminal fluid  This raised to some level of concern  This area was carefully monitored and inspected  We were able to visualize into the deep bladder along the bladder trigone in the left ureteral orifice was seen to be well away from our bladder neck incision and to be copiously draining clear urine  Still, our concern for abnormality let us to monitor the seminal vesicle and the structure that had been partially transected  We visualized peristalsis and gush again of clear fluid  This raised immediate concern for a potential duplicated an ectopic insertion of a left ureter  Robotically, a Bard solo +wire was introduced through the assistant port  This was introduced into the left trigone and ureteral orifice  Over top of this wire, 6 x 26 double-J stent was passed robotically without any incident  The wire was completely removed and there was a good distal coil that was tucked into the bladder  We now mobilize the left seminal vesicle tip and the structure that was raising concern  This was able to be mobilized off however there was a visual opening concerning for a transected ureter  However, this could not be confirmed  Methylene blue was administered intravenously by anesthesia at this point time  Because of bleeding and passage of urine in through the bladder, we could not confirm drainage of methylene blue through this transected structure peer I was able to pass a Bard solo +wire and another 6 x 26 double-J stent into this structure  At this point time, I felt it was prudent to proceed with the rest of our surgery  We would readdress this area after the prostate was removed      Attention was now focused on the bilateral vascular pedicle and we performed a near complete athermal bilateral nerve sparing procedure  Unfortunately were not able to mobilize a Veil of Aphrodite nor high anterior release given the patient's deep enlarged prostate that was well shoulder by the bony pelvis  Packets of the vascular pedicle were created with the scissor and Weck clips were applied  Exclusive sharp scissor dissection was used to take the right vascular pedicle  I then incised the lateral prostatic fascia from base to apex  I began to then create a plane between the  neurovascular bundle and the prostate itself  The capsule of the prostate was clearly visualized and not violated  I perform this dissection all the way from base to apex  The last remaining attachments were the dorsal venous complex and the urethra  The dorsal venous complex was taken with cold scissor dissection  I then placed the prostate on traction and identified the urethra  The urethra was taken sharply with scissors  The Albarado catheter was pulled back and the posterior urethra was taken with sharp scissors as well  The last remaining attachments of the rectourethralis muscle were taken with sharp scissors  At this point this specimen was completely free within the pelvis and placed into an Endo Catch bag  At this point, I returned my attention to the transected structure  Given my concern for ureteral injury, I did reach out to my partner, Dr Renu Macdonald for assistance  As he was making his way to the operating room, we did discuss the case  We felt that further imaging would be prudent  Attempts were made to involve the radiology team for a flat plate ultrasound, however given the patient's sterile field and tape secure to the bed, we could not do a flat plate procedure  Ultrasound was involved an attempt was made with sterile intraoperative ultrasound in the left flank  However given the pneumoperitoneum, we were unable to carefully visualize the kidney or placement of stent    At this point time, we felt C-arm Radiology would be prudent  The robot was de docked and if  image was taken  This did appear to show 2 stents in position, 1 lateral and inferior likely the lower pole moiety and 1 medial and superior  The robot was de docked and a 5 [place] open-ended catheter was placed alongside the stent in the transected structure  A retrograde pyelogram was performed and this did demonstrate filling of the collecting system  This essentially 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  All of this information was conveyed to the patient's wife who is in the waiting room by telephone  Our discussion about the plan to reimplant his upper pole moiety was well discussed  Mobilizing the previously dropped bladder to the right hemipelvis, we were able to dissect into the retroperitoneum and identify the 2 stented structures  They appear to run in a common sheath although were completely separate  A Penrose drain was placed around the structures and they were mobilized distally towards the bladder  With the previously placed 5 [place] tiger tip catheter, 5 cc of indocyanine green was instilled and FireFly was used to confirm the upper pole ureter  This was dissected free of its lower pole ureter  It was mobilized down towards the pelvis and ultimately we transected half of the ureter until the stent was visualized  A Weck clip was placed on the distal ureter  The ureter was freed and the stent was removed  The now transected ureter was mobilized away from the field  We returned our attention to the completion of the prostate portion  Attention was focused on performing the pelvic lymph node dissection  I first focused on the right side  The external iliac vein was identified and skeletonized  The pelvic lymph node packet adjacent to the pelvic sidewall was elevated off of the sidewall and dissected free from the surrounding tissue    The obturator nerve was identified and preserved  The lymph node packet was completely dissected and removed from the surgical field and sent for permanent specimen  The same procedure was then repeated on the left side  Lymph node packets were ligated with Weck clips to reduce lymphatic leakage  I then performed the anastomosis between the bladder and urethra  I started with 2 V lock sutures placed in an outside in fashion on the bladder neck posteriorly  The anastomosis was performed in an inside out fashion on the urethra and an outside in fashion on the bladder  We took great care to avoid the stent placed in the left ureteral orifice in the orthotopic trigone  Once the 2 sutures were placed in either side tension was carefully taken off of the sutures until there was excellent reapproximation of the posterior urethra to the posterior bladder neck  I then completed approximately two thirds of the anastomosis in this fashion  I ultimately locked suture on the bladder side and completed the anastomosis in an outside in fashion on the urethra and in an inside out fashion on the bladder  Prior to completing the anastomosis a new Albarado catheter was placed under vision  The anastomosis was completed and the sutures were secured  15 cc were placed into the balloon  The catheter was placed on gentle traction and the bladder was irrigated with over 200 cc of sterile saline with minimal extravasation  We now returned our attention to the upper pole ureter  This was widely spatulated  Using electrocautery, we dissected an area on the dome which allowed us to place the ureter in a tension-free position  We dissected true the perivesical fat detrusor muscle  Once the mucosa was bulging, small incision was made  Three 0 Monocryl sutures were used at the apex of the spatulated ureter  A running anastomosis was performed almost circumferentially with 3 initial apex 3 0 Monocryls and completion with 4 0 Monocryls    The existing stent in the upper pole moiety was placed into the bladder before completion  The detrusor muscle was approximated over top of the reimplant with a 3 0 V lock  A Davin drain was then brought out through the left lower quadrant robotic port under vision  The Endo Catch bag string was brought out through the supra umbilical 8 mm port  The supraumbilical 8 mm port was opened to allow for easy removal of the prostate and seminal vesicles within the Endo Catch bag  The fascia was then reapproximated with 0 Prolene suture  All incisions were irrigated and the skin was closed with 4-0 Monocryl and history  The drain was secured in place with a drain stitch and a drain sponge was placed  The catheter was placed onto gentle traction and secured  Overall the patient tolerated the procedure well and there were no complications  The patient was extubated in the operating room and transferred to the PACU in stable condition at the conclusion of the case  The assistance of Dr Hope Pimentel as well as the advanced practitioner team was absolutely necessary and required given the increased complexity of this case and the reconstruction required  There was no qualified resident present or available  Plan-the patient will be admitted to the hospital with Albarado catheter and GILBERT drainage  Will carefully monitor his GILBERT output  When he is stabilized for discharge, patient will be sent home with his Albarado in the indwelling stents  These will likely be in place at least 4-6 weeks  His catheter as long as there is no leak will remain in place for 2 weeks       I was present for the entire procedure    Patient Disposition:  PACU     SIGNATURE: Rai Gallego MD  DATE: September 4, 2019  TIME: 6:53 PM

## 2019-09-04 NOTE — PERIOPERATIVE NURSING NOTE
Surgeon requested a call be placed to OR waiting room to inform wife case is underway and all is going well  Spoke to volunteer who relayed message to wife that surgery underway and all is going well

## 2019-09-04 NOTE — DISCHARGE INSTRUCTIONS
After surgery, you should be active when at home  You will need to take plenty of rest  No heavy lifting (weight limit is approximately a gallon jug of milk held in each hand ) You can shower but do not submerge in water; no tubs/pools/baths  You can walk up and down stairs  Your incision has disolvable sutures and surgical glue  If there is any concern about the incision (redness, drainage, bleeding) please call your surgeon's office  You can eat and drink whatever you like, but monitor for signs of constipation  You should be having daily bowel movements  Take your stool softeners until your bowel begin to function  If you are still constipated, call your surgeon's office to discuss additional medication  Robot Assisted Laparoscopic Prostatectomy   WHAT YOU NEED TO KNOW:   Robot-assisted laparoscopic prostatectomy (RALP) is surgery to remove your prostate gland through small incisions in your abdomen  RALP is done with a machine that is controlled by your surgeon  The machine has mechanical arms that use small tools to remove your prostate  DISCHARGE INSTRUCTIONS:   Medicines:   · Pain medicine: You may be given a prescription medicine to decrease pain  Do not wait until the pain is severe before you take this medicine  · Stool softeners: This medicine makes it easier for you to have a bowel movement  You may need this medicine to treat or prevent constipation  · Antibiotics: This medicine is given to fight or prevent an infection caused by bacteria  Always take your antibiotics exactly as ordered by your healthcare provider  Do not stop taking your medicine unless directed by your healthcare provider  Never save antibiotics or take leftover antibiotics that were given to you for another illness  · Take your medicine as directed  Contact your healthcare provider if you think your medicine is not helping or if you have side effects  Tell him or her if you are allergic to any medicine  Keep a list of the medicines, vitamins, and herbs you take  Include the amounts, and when and why you take them  Bring the list or the pill bottles to follow-up visits  Carry your medicine list with you in case of an emergency  Follow up with your healthcare provider or uro-oncologist in 1 week or as directed: You will need your urinary catheter removed  Tests will show if any cancer remains in your body after surgery  Write down your questions so you remember to ask them during your visits  A Albarado catheter  is a tube put into your bladder to drain urine into a bag  Keep the bag below your waist  This will prevent urine from flowing back into your bladder and causing an infection or other problems  Also, keep the tube free of kinks so the urine will drain properly  Do not pull on the catheter  This can cause pain and bleeding, and may cause the catheter to come out  Wound care: Follow your healthcare provider's directions on how to care for your incisions  Ask when you can start showering or bathing  You will need to keep your stitches covered so they do not get wet  What to expect after surgery:   · Activities: You may feel like resting more after surgery  Slowly start to do more each day  Rest when you feel it is needed  ¨ Normal daily activities:  Ask your healthcare provider when it is safe to return to your normal daily activities  You may need to wait 4 to 6 weeks after surgery  Your healthcare provider will tell you about the activities you should avoid after your surgery  These may include driving while you are taking pain medicines or until your catheter is removed  You may also be given a weight restriction on lifting objects  ¨ Walking and other exercise:  Walking is an important activity to help with your recovery after surgery  Walking is a good way to improve your overall health and help you recover sooner  It also helps keep your blood flowing and reduces the risk of blood clots   Other types of exercise can also be an important part of your recovery  Ask about the exercises that are safe for you  ¨ Sexual activity:  Ask when it is safe to start having sexual intercourse again  You may have trouble having an erection  Your erections may return with time  Medicines and mechanical aids may help  Talk to your healthcare provider about these options  · Bladder control:  After surgery, you may have problems controlling when you urinate  Ask your healthcare provider about pads and liners that absorb urine while you recover  · Constipation:  You may have problems having bowel movements during your recovery  Ask your healthcare provider about diet changes if you are having problems with constipation  Contact your healthcare provider or uro-oncologist if:   · You have a fever  · Your pain is getting worse, even with medicine  · You have an incision that is red, swollen, or has pus coming from it  · You are urinating less than usual      · You have trouble urinating or having a bowel movement  · You have questions or concerns about your condition or care  Seek care immediately or call 911 if:   · You have more blood in your urine than you were told to expect, or you pass a blood clot  · You have an upset stomach or are vomiting  · You have painful swelling in your abdomen that does not go away  · You suddenly feel lightheaded and short of breath  · You have chest pain when you take a deep breath or cough  You cough up blood  · Your arm or leg feels warm, tender, and painful  It may look swollen and red  © 2017 [employer] Information is for End User's use only and may not be sold, redistributed or otherwise used for commercial purposes  All illustrations and images included in CareNotes® are the copyrighted property of [employer]  or [employer]  The above information is an  only   It is not intended as medical advice for individual conditions or treatments  Talk to your doctor, nurse or pharmacist before following any medical regimen to see if it is safe and effective for you

## 2019-09-05 LAB
ANION GAP SERPL CALCULATED.3IONS-SCNC: 9 MMOL/L (ref 4–13)
BUN SERPL-MCNC: 17 MG/DL (ref 5–25)
CALCIUM SERPL-MCNC: 8 MG/DL (ref 8.3–10.1)
CHLORIDE SERPL-SCNC: 105 MMOL/L (ref 100–108)
CO2 SERPL-SCNC: 25 MMOL/L (ref 21–32)
CREAT SERPL-MCNC: 1.23 MG/DL (ref 0.6–1.3)
ERYTHROCYTE [DISTWIDTH] IN BLOOD BY AUTOMATED COUNT: 13.2 % (ref 11.6–15.1)
GFR SERPL CREATININE-BSD FRML MDRD: 63 ML/MIN/1.73SQ M
GLUCOSE P FAST SERPL-MCNC: 132 MG/DL (ref 65–99)
GLUCOSE SERPL-MCNC: 132 MG/DL (ref 65–140)
HCT VFR BLD AUTO: 39.2 % (ref 36.5–49.3)
HGB BLD-MCNC: 12.7 G/DL (ref 12–17)
MCH RBC QN AUTO: 30.6 PG (ref 26.8–34.3)
MCHC RBC AUTO-ENTMCNC: 32.4 G/DL (ref 31.4–37.4)
MCV RBC AUTO: 95 FL (ref 82–98)
PLATELET # BLD AUTO: 212 THOUSANDS/UL (ref 149–390)
PMV BLD AUTO: 9.5 FL (ref 8.9–12.7)
POTASSIUM SERPL-SCNC: 4.4 MMOL/L (ref 3.5–5.3)
RBC # BLD AUTO: 4.15 MILLION/UL (ref 3.88–5.62)
SODIUM SERPL-SCNC: 139 MMOL/L (ref 136–145)
WBC # BLD AUTO: 12.47 THOUSAND/UL (ref 4.31–10.16)

## 2019-09-05 PROCEDURE — 99024 POSTOP FOLLOW-UP VISIT: CPT | Performed by: PHYSICIAN ASSISTANT

## 2019-09-05 PROCEDURE — 85027 COMPLETE CBC AUTOMATED: CPT | Performed by: UROLOGY

## 2019-09-05 PROCEDURE — 80048 BASIC METABOLIC PNL TOTAL CA: CPT | Performed by: UROLOGY

## 2019-09-05 RX ADMIN — ENOXAPARIN SODIUM 40 MG: 40 INJECTION SUBCUTANEOUS at 08:38

## 2019-09-05 RX ADMIN — CLINDAMYCIN PHOSPHATE 600 MG: 600 INJECTION, SOLUTION INTRAVENOUS at 09:53

## 2019-09-05 RX ADMIN — DOCUSATE SODIUM 100 MG: 100 CAPSULE, LIQUID FILLED ORAL at 08:38

## 2019-09-05 RX ADMIN — ONDANSETRON 4 MG: 2 INJECTION INTRAMUSCULAR; INTRAVENOUS at 04:39

## 2019-09-05 RX ADMIN — CLINDAMYCIN PHOSPHATE 600 MG: 600 INJECTION, SOLUTION INTRAVENOUS at 02:29

## 2019-09-05 RX ADMIN — OXYBUTYNIN CHLORIDE 5 MG: 5 TABLET, EXTENDED RELEASE ORAL at 08:38

## 2019-09-05 RX ADMIN — SODIUM CHLORIDE, SODIUM LACTATE, POTASSIUM CHLORIDE, AND CALCIUM CHLORIDE 100 ML/HR: .6; .31; .03; .02 INJECTION, SOLUTION INTRAVENOUS at 15:03

## 2019-09-05 RX ADMIN — HYDROCODONE BITARTRATE AND ACETAMINOPHEN 1 TABLET: 5; 325 TABLET ORAL at 04:39

## 2019-09-05 RX ADMIN — HYDROCODONE BITARTRATE AND ACETAMINOPHEN 1 TABLET: 5; 325 TABLET ORAL at 17:29

## 2019-09-05 RX ADMIN — HYDROCODONE BITARTRATE AND ACETAMINOPHEN 2 TABLET: 5; 325 TABLET ORAL at 11:36

## 2019-09-05 RX ADMIN — DOCUSATE SODIUM 100 MG: 100 CAPSULE, LIQUID FILLED ORAL at 17:27

## 2019-09-05 RX ADMIN — HYDROCODONE BITARTRATE AND ACETAMINOPHEN 1 TABLET: 5; 325 TABLET ORAL at 21:55

## 2019-09-05 RX ADMIN — SODIUM CHLORIDE, SODIUM LACTATE, POTASSIUM CHLORIDE, AND CALCIUM CHLORIDE 150 ML/HR: .6; .31; .03; .02 INJECTION, SOLUTION INTRAVENOUS at 05:53

## 2019-09-05 NOTE — UTILIZATION REVIEW
Initial Clinical Review      ADMIT  OUTPATIENT  NO CHARGE BED  On  9/4  @  1849     CHANGED to OBS status on  9/5  @  9558   Due to extended recovery time needed for improvement in bowel function and po intake        Age/Sex: 66 y o  male  Surgery Date:   9/4  Procedure:  PROSTATECTOMY RADICAL W ROBOTICS; BILATERAL PELVIC LYMPH NODE DISSECTION LYSIS OF ADHESIONS Robotic; DOUBLE LEFT URETERAL STENT INSERTION with retrograde pyelogram, Instillation of ICG; left upper pole ureteral re-implantation Robotic    Anesthesia:  General   Operative Findings:   1  Sigmoid adhesions  2  Large deep pelvic prostate  3  Posterior bladder neck incision, carried laterally with immediate gush of fluid with transection of structure eminating from the LEFT seminal vesicle   4  Stent placed in the the LEFT ureteral orifice, seen within the bladder  5  Stent placed in tubular structure dissected from the LEFT seminal vesicle   6  Attempt at instillation of methylene blue, intraoperative flat plate KUB and 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  7  Intraoperative C-ARM did demonstrate stents in upper and lower pole moiety, retrograde pyelogram of upper pole confirmed  8  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  9  ICG instilled thru 5F in the transected structure to confirm the correct ureter  10  LEFT upper pole moeity neoureterocystotomy        9/5  Vital Signs: /74 (BP Location: Right arm)   Pulse 70   Temp 99 7 °F (37 6 °C) (Temporal)   Resp 18   Ht 5' 10" (1 778 m)   Wt 88 5 kg (195 lb)   SpO2 98%   BMI 27 98 kg/m²   Diet:  Advance as tolerated  Mobility:   Ambulate q shift  DVT Prophylaxis:   SCD's    9/5  POD 1   09/05/19 0716  99 7 °F (37 6 °C)  70  18  134/74  --  98 %   Remains on IVF LR @  100 cc/hr,  Percocet po x3 postop,  IV zofran x2 postop (last given 0439 today) ROM  dc'ed last evening @  1900    UROLOGY  POD 2 -  C/o abd bloating; Not yet ambulated; no flatus  Postoperative  DIANA  of 1 56 (crt recovering to 1 23 today)   POSTOP reactive leukocytosis, down to 12 5 today  Pt will need to stay thru tonight to ensure resolution of DIANA,  Improvement in bowel function, po intake, activity       Medications/Pain Control:   Current Facility-Administered Medications:    9/4  IV clindamycin 900 mg q 8 hr changed to  IV CLINDAMYCIN  600 mg  q 8 hrs  On  9/5  docusate sodium 100 mg Oral BID   enoxaparin 40 mg Subcutaneous Daily   HYDROcodone-acetaminophen 1 tablet Oral Q4H PRN   HYDROcodone-acetaminophen 2 tablet Oral Q4H PRN   HYDROmorphone 0 5 mg Intravenous Q2H PRN   lactated ringers 100 mL/hr Intravenous Continuous   ondansetron 4 mg Intravenous Q6H PRN   oxybutynin 5 mg Oral Daily   simethicone 80 mg Oral 4x Daily PRN       Network Utilization Review Department  Phone: [phone]; Fax [phone]  [email or web address]  ATTENTION: Please call with any questions or concerns to [phone]  and carefully listen to the prompts so that you are directed to the right person  Send all requests for admission clinical reviews, approved or denied determinations and any other requests to fax [phone]   All voicemails are confidential

## 2019-09-05 NOTE — SOCIAL WORK
Pt admitted for outpt surgery requiring additional day of observation due to complexity of surgery  Pt will be d/c with low in place- pt able to care for independently or family will assist   Pt lives with wife and is independent with all aspects of care ambulating without AD  Pt denies MH hx, D&A nor current home care services  PCP is Dr Erick Blanca and he uses [facility] in [place] for prescriptions  Pt's family will transport home when medically cleared  No questions/concerns voiced at this time  No barriers to d/c identified at this time

## 2019-09-05 NOTE — PLAN OF CARE
Problem: Nutrition/Hydration-ADULT  Goal: Nutrient/Hydration intake appropriate for improving, restoring or maintaining nutritional needs  Description  Monitor and assess patient's nutrition/hydration status for malnutrition  Collaborate with interdisciplinary team and initiate plan and interventions as ordered  Monitor patient's weight and dietary intake as ordered or per policy  Utilize nutrition screening tool and intervene as necessary  Determine patient's food preferences and provide high-protein, high-caloric foods as appropriate       INTERVENTIONS:  - Monitor oral intake, urinary output, labs, and treatment plans  - Assess nutrition and hydration status and recommend course of action  - Evaluate amount of meals eaten  - Assist patient with eating if necessary   - Allow adequate time for meals  - Recommend/ encourage appropriate diets, oral nutritional supplements, and vitamin/mineral supplements  - Order, calculate, and assess calorie counts as needed  - Recommend, monitor, and adjust tube feedings and TPN/PPN based on assessed needs  - Assess need for intravenous fluids  - Provide specific nutrition/hydration education as appropriate  - Include patient/family/caregiver in decisions related to nutrition  9/5/2019 1922 by Trevor Ambrosio RN  Outcome: Progressing  9/5/2019 0559 by Trevor Ambrosio RN  Outcome: Progressing     Problem: PAIN - ADULT  Goal: Verbalizes/displays adequate comfort level or baseline comfort level  Description  Interventions:  - Encourage patient to monitor pain and request assistance  - Assess pain using appropriate pain scale  - Administer analgesics based on type and severity of pain and evaluate response  - Implement non-pharmacological measures as appropriate and evaluate response  - Consider cultural and social influences on pain and pain management  - Notify physician/advanced practitioner if interventions unsuccessful or patient reports new pain  9/5/2019 1922 by Trevor Ambrosio RN  Outcome: Progressing  9/5/2019 0559 by Gerri Salmon RN  Outcome: Progressing     Problem: INFECTION - ADULT  Goal: Absence or prevention of progression during hospitalization  Description  INTERVENTIONS:  - Assess and monitor for signs and symptoms of infection  - Monitor lab/diagnostic results  - Monitor all insertion sites, i e  indwelling lines, tubes, and drains  - Monitor endotracheal if appropriate and nasal secretions for changes in amount and color  - [place] appropriate cooling/warming therapies per order  - Administer medications as ordered  - Instruct and encourage patient and family to use good hand hygiene technique  - Identify and instruct in appropriate isolation precautions for identified infection/condition  9/5/2019 1922 by Gerri Salmon RN  Outcome: Progressing  9/5/2019 0559 by Gerri Salmon RN  Outcome: Progressing  Goal: Absence of fever/infection during neutropenic period  Description  INTERVENTIONS:  - Monitor WBC    9/5/2019 1922 by Gerri Salmon RN  Outcome: Progressing  9/5/2019 0559 by Gerri Salmon RN  Outcome: Progressing     Problem: SAFETY ADULT  Goal: Patient will remain free of falls  Description  INTERVENTIONS:  - Assess patient frequently for physical needs  -  Identify cognitive and physical deficits and behaviors that affect risk of falls    -  [place] fall precautions as indicated by assessment   - Educate patient/family on patient safety including physical limitations  - Instruct patient to call for assistance with activity based on assessment  - Modify environment to reduce risk of injury  - Consider OT/PT consult to assist with strengthening/mobility  9/5/2019 1922 by Gerri Salmon RN  Outcome: Progressing  9/5/2019 0559 by Gerri Salmon RN  Outcome: Progressing  Goal: Maintain or return to baseline ADL function  Description  INTERVENTIONS:  -  Assess patient's ability to carry out ADLs; assess patient's baseline for ADL function and identify physical deficits which impact ability to perform ADLs (bathing, care of mouth/teeth, toileting, grooming, dressing, etc )  - Assess/evaluate cause of self-care deficits   - Assess range of motion  - Assess patient's mobility; develop plan if impaired  - Assess patient's need for assistive devices and provide as appropriate  - Encourage maximum independence but intervene and supervise when necessary  - Involve family in performance of ADLs  - Assess for home care needs following discharge   - Consider OT consult to assist with ADL evaluation and planning for discharge  - Provide patient education as appropriate  9/5/2019 1922 by Mer Mcdowell RN  Outcome: Progressing  9/5/2019 0559 by Mer Mcdowell RN  Outcome: Progressing  Goal: Maintain or return mobility status to optimal level  Description  INTERVENTIONS:  - Assess patient's baseline mobility status (ambulation, transfers, stairs, etc )    - Identify cognitive and physical deficits and behaviors that affect mobility  - Identify mobility aids required to assist with transfers and/or ambulation (gait belt, sit-to-stand, lift, walker, cane, etc )  - [place] fall precautions as indicated by assessment  - Record patient progress and toleration of activity level on Mobility SBAR; progress patient to next Phase/Stage  - Instruct patient to call for assistance with activity based on assessment  - Consider rehabilitation consult to assist with strengthening/weightbearing, etc   9/5/2019 1922 by Mer Mcdowell RN  Outcome: Progressing  9/5/2019 0559 by Mer Mcdowell RN  Outcome: Progressing     Problem: DISCHARGE PLANNING  Goal: Discharge to home or other facility with appropriate resources  Description  INTERVENTIONS:  - Identify barriers to discharge w/patient and caregiver  - Arrange for needed discharge resources and transportation as appropriate  - Identify discharge learning needs (meds, wound care, etc )  - Arrange for interpretive services to assist at discharge as needed  - Refer to Case Management Department for coordinating discharge planning if the patient needs post-hospital services based on physician/advanced practitioner order or complex needs related to functional status, cognitive ability, or social support system  9/5/2019 1922 by Daquan Dow RN  Outcome: Progressing  9/5/2019 0559 by Daquan Dow RN  Outcome: Progressing     Problem: GENITOURINARY - ADULT  Goal: Maintains or returns to baseline urinary function  Description  INTERVENTIONS:  - Assess urinary function  - Encourage oral fluids to ensure adequate hydration if ordered  - Administer IV fluids as ordered to ensure adequate hydration  - Administer ordered medications as needed  - Offer frequent toileting  - Follow urinary retention protocol if ordered  9/5/2019 1922 by Daquan Dow RN  Outcome: Progressing  9/5/2019 0559 by Daquan Dow RN  Outcome: Progressing  Goal: Absence of urinary retention  Description  INTERVENTIONS:  - Assess patients ability to void and empty bladder  - Monitor I/O  - Bladder scan as needed  - Discuss with physician/AP medications to alleviate retention as needed  - Discuss catheterization for long term situations as appropriate  9/5/2019 1922 by Daquan Dow RN  Outcome: Progressing  9/5/2019 0559 by Daquan Dow RN  Outcome: Progressing  Goal: Urinary catheter remains patent  Description  INTERVENTIONS:  - Assess patency of urinary catheter  - If patient has a chronic low, consider changing catheter if non-functioning  - Follow guidelines for intermittent irrigation of non-functioning urinary catheter  9/5/2019 1922 by Daquan Dow RN  Outcome: Progressing  9/5/2019 0559 by Daquan Dow RN  Outcome: Progressing     Problem: SKIN/TISSUE INTEGRITY - ADULT  Goal: Skin integrity remains intact  Description  INTERVENTIONS  - Identify patients at risk for skin breakdown  - Assess and monitor skin integrity  - Assess and monitor nutrition and hydration status  - Monitor labs (i e  albumin)  - Assess for incontinence   - Turn and reposition patient  - Assist with mobility/ambulation  - Relieve pressure over bony prominences  - Avoid friction and shearing  - Provide appropriate hygiene as needed including keeping skin clean and dry  - Evaluate need for skin moisturizer/barrier cream  - Collaborate with interdisciplinary team (i e  Nutrition, Rehabilitation, etc )   - Patient/family teaching  9/5/2019 1922 by Bree Núñez RN  Outcome: Progressing  9/5/2019 0559 by Bree Núñez RN  Outcome: Progressing  Goal: Incision(s), wounds(s) or drain site(s) healing without S/S of infection  Description  INTERVENTIONS  - Assess and document risk factors for skin impairment   - Assess and document dressing, incision, wound bed, drain sites and surrounding tissue  - Consider nutrition services referral as needed  - Oral mucous membranes remain intact  - Provide patient/ family education  9/5/2019 1922 by Bree Núñez RN  Outcome: Progressing  9/5/2019 0559 by Bree Núñez RN  Outcome: Progressing  Goal: Oral mucous membranes remain intact  Description  INTERVENTIONS  - Assess oral mucosa and hygiene practices  - Implement preventative oral hygiene regimen  - Implement oral medicated treatments as ordered  - Initiate Nutrition services referral as needed  9/5/2019 1922 by Bree Núñez RN  Outcome: Progressing  9/5/2019 0559 by Bree Núñez RN  Outcome: Progressing     Problem: Potential for Falls  Goal: Patient will remain free of falls  Description  INTERVENTIONS:  - Assess patient frequently for physical needs  -  Identify cognitive and physical deficits and behaviors that affect risk of falls    -  [place] fall precautions as indicated by assessment   - Educate patient/family on patient safety including physical limitations  - Instruct patient to call for assistance with activity based on assessment  - Modify environment to reduce risk of injury  - Consider OT/PT consult to assist with strengthening/mobility  9/5/2019 1922 by Cesar Mcfadden RN  Outcome: Progressing  9/5/2019 0559 by Cesar Mcfadden RN  Outcome: Progressing

## 2019-09-05 NOTE — ASSESSMENT & PLAN NOTE
1 Day Post-Op  Procedure(s):  PROSTATECTOMY RADICAL W ROBOTICS; BILATERAL PELVIC LYMPH NODE DISSECTION LYSIS OF ADHESIONS Robotic; DOUBLE LEFT URETERAL STENT INSERTION with retrograde pyelogram, Instillation of ICG; left upper pole ureteral re-implantation Robotic  Surgeon(s):  VASU Vizcaino MD Dayne Bodily, MD Conchetta Linsey, MD  9/4/2019    Feeling well  Tolerating PO intake  Encourage incentive anali & ambulation today  Await return of bowel function  Postoperative a KI with creatinine bumped to 1 56 in postoperative blood work  Creatinine has recover to 1 23 today  He had a reactive leukocytosis postoperatively which is resolving down to 12 5 today

## 2019-09-05 NOTE — PLAN OF CARE
Problem: Nutrition/Hydration-ADULT  Goal: Nutrient/Hydration intake appropriate for improving, restoring or maintaining nutritional needs  Description  Monitor and assess patient's nutrition/hydration status for malnutrition  Collaborate with interdisciplinary team and initiate plan and interventions as ordered  Monitor patient's weight and dietary intake as ordered or per policy  Utilize nutrition screening tool and intervene as necessary  Determine patient's food preferences and provide high-protein, high-caloric foods as appropriate       INTERVENTIONS:  - Monitor oral intake, urinary output, labs, and treatment plans  - Assess nutrition and hydration status and recommend course of action  - Evaluate amount of meals eaten  - Assist patient with eating if necessary   - Allow adequate time for meals  - Recommend/ encourage appropriate diets, oral nutritional supplements, and vitamin/mineral supplements  - Order, calculate, and assess calorie counts as needed  - Recommend, monitor, and adjust tube feedings and TPN/PPN based on assessed needs  - Assess need for intravenous fluids  - Provide specific nutrition/hydration education as appropriate  - Include patient/family/caregiver in decisions related to nutrition  Outcome: Progressing     Problem: PAIN - ADULT  Goal: Verbalizes/displays adequate comfort level or baseline comfort level  Description  Interventions:  - Encourage patient to monitor pain and request assistance  - Assess pain using appropriate pain scale  - Administer analgesics based on type and severity of pain and evaluate response  - Implement non-pharmacological measures as appropriate and evaluate response  - Consider cultural and social influences on pain and pain management  - Notify physician/advanced practitioner if interventions unsuccessful or patient reports new pain  Outcome: Progressing     Problem: INFECTION - ADULT  Goal: Absence or prevention of progression during hospitalization  Description  INTERVENTIONS:  - Assess and monitor for signs and symptoms of infection  - Monitor lab/diagnostic results  - Monitor all insertion sites, i e  indwelling lines, tubes, and drains  - Monitor endotracheal if appropriate and nasal secretions for changes in amount and color  - [place] appropriate cooling/warming therapies per order  - Administer medications as ordered  - Instruct and encourage patient and family to use good hand hygiene technique  - Identify and instruct in appropriate isolation precautions for identified infection/condition  Outcome: Progressing  Goal: Absence of fever/infection during neutropenic period  Description  INTERVENTIONS:  - Monitor WBC    Outcome: Progressing     Problem: SAFETY ADULT  Goal: Patient will remain free of falls  Description  INTERVENTIONS:  - Assess patient frequently for physical needs  -  Identify cognitive and physical deficits and behaviors that affect risk of falls    -  [place] fall precautions as indicated by assessment   - Educate patient/family on patient safety including physical limitations  - Instruct patient to call for assistance with activity based on assessment  - Modify environment to reduce risk of injury  - Consider OT/PT consult to assist with strengthening/mobility  Outcome: Progressing  Goal: Maintain or return to baseline ADL function  Description  INTERVENTIONS:  -  Assess patient's ability to carry out ADLs; assess patient's baseline for ADL function and identify physical deficits which impact ability to perform ADLs (bathing, care of mouth/teeth, toileting, grooming, dressing, etc )  - Assess/evaluate cause of self-care deficits   - Assess range of motion  - Assess patient's mobility; develop plan if impaired  - Assess patient's need for assistive devices and provide as appropriate  - Encourage maximum independence but intervene and supervise when necessary  - Involve family in performance of ADLs  - Assess for home care needs following discharge   - Consider OT consult to assist with ADL evaluation and planning for discharge  - Provide patient education as appropriate  Outcome: Progressing  Goal: Maintain or return mobility status to optimal level  Description  INTERVENTIONS:  - Assess patient's baseline mobility status (ambulation, transfers, stairs, etc )    - Identify cognitive and physical deficits and behaviors that affect mobility  - Identify mobility aids required to assist with transfers and/or ambulation (gait belt, sit-to-stand, lift, walker, cane, etc )  - [place] fall precautions as indicated by assessment  - Record patient progress and toleration of activity level on Mobility SBAR; progress patient to next Phase/Stage  - Instruct patient to call for assistance with activity based on assessment  - Consider rehabilitation consult to assist with strengthening/weightbearing, etc   Outcome: Progressing     Problem: DISCHARGE PLANNING  Goal: Discharge to home or other facility with appropriate resources  Description  INTERVENTIONS:  - Identify barriers to discharge w/patient and caregiver  - Arrange for needed discharge resources and transportation as appropriate  - Identify discharge learning needs (meds, wound care, etc )  - Arrange for interpretive services to assist at discharge as needed  - Refer to Case Management Department for coordinating discharge planning if the patient needs post-hospital services based on physician/advanced practitioner order or complex needs related to functional status, cognitive ability, or social support system  Outcome: Progressing     Problem: GENITOURINARY - ADULT  Goal: Maintains or returns to baseline urinary function  Description  INTERVENTIONS:  - Assess urinary function  - Encourage oral fluids to ensure adequate hydration if ordered  - Administer IV fluids as ordered to ensure adequate hydration  - Administer ordered medications as needed  - Offer frequent toileting  - Follow urinary retention protocol if ordered  Outcome: Progressing  Goal: Absence of urinary retention  Description  INTERVENTIONS:  - Assess patients ability to void and empty bladder  - Monitor I/O  - Bladder scan as needed  - Discuss with physician/AP medications to alleviate retention as needed  - Discuss catheterization for long term situations as appropriate  Outcome: Progressing  Goal: Urinary catheter remains patent  Description  INTERVENTIONS:  - Assess patency of urinary catheter  - If patient has a chronic low, consider changing catheter if non-functioning  - Follow guidelines for intermittent irrigation of non-functioning urinary catheter  Outcome: Progressing     Problem: SKIN/TISSUE INTEGRITY - ADULT  Goal: Skin integrity remains intact  Description  INTERVENTIONS  - Identify patients at risk for skin breakdown  - Assess and monitor skin integrity  - Assess and monitor nutrition and hydration status  - Monitor labs (i e  albumin)  - Assess for incontinence   - Turn and reposition patient  - Assist with mobility/ambulation  - Relieve pressure over bony prominences  - Avoid friction and shearing  - Provide appropriate hygiene as needed including keeping skin clean and dry  - Evaluate need for skin moisturizer/barrier cream  - Collaborate with interdisciplinary team (i e  Nutrition, Rehabilitation, etc )   - Patient/family teaching  Outcome: Progressing  Goal: Incision(s), wounds(s) or drain site(s) healing without S/S of infection  Description  INTERVENTIONS  - Assess and document risk factors for skin impairment   - Assess and document dressing, incision, wound bed, drain sites and surrounding tissue  - Consider nutrition services referral as needed  - Oral mucous membranes remain intact  - Provide patient/ family education  Outcome: Progressing  Goal: Oral mucous membranes remain intact  Description  INTERVENTIONS  - Assess oral mucosa and hygiene practices  - Implement preventative oral hygiene regimen  - Implement oral medicated treatments as ordered  - Initiate Nutrition services referral as needed  Outcome: Progressing     Problem: Potential for Falls  Goal: Patient will remain free of falls  Description  INTERVENTIONS:  - Assess patient frequently for physical needs  -  Identify cognitive and physical deficits and behaviors that affect risk of falls    -  [place] fall precautions as indicated by assessment   - Educate patient/family on patient safety including physical limitations  - Instruct patient to call for assistance with activity based on assessment  - Modify environment to reduce risk of injury  - Consider OT/PT consult to assist with strengthening/mobility  Outcome: Progressing

## 2019-09-05 NOTE — PROGRESS NOTES
Progress Note - Urology  Fani Cardoso 1955, 66 y o  male MRN: [id number]    Unit/Bed#: [facility] -01 Encounter: [id number]    * Prostate cancer [facility])  Assessment & Plan  1 Day Post-Op  Procedure(s):  PROSTATECTOMY RADICAL W ROBOTICS; BILATERAL PELVIC LYMPH NODE DISSECTION LYSIS OF ADHESIONS Robotic; DOUBLE LEFT URETERAL STENT INSERTION with retrograde pyelogram, Instillation of ICG; left upper pole ureteral re-implantation Robotic  Surgeon(s):  VASU Mathis MD Unknown Idler, MD Princeton Rolling, MD  9/4/2019    Feeling well  Tolerating PO intake  Encourage incentive anali & ambulation today  Await return of bowel function  Postoperative a KI with creatinine bumped to 1 56 in postoperative blood work  Creatinine has recover to 1 23 today  He had a reactive leukocytosis postoperatively which is resolving down to 12 5 today  Bedside rounds performed with CHRISTINA Pereyra  Discussed with Dr Jaclyn Catherine  Subjective/Objective     Subjective:   Shaquille Barahona feels well today  He is complaining of some abdominal bloating  He has not yet been out of bed or ambulating  Albarado catheter has been draining well overnight  He is complaining of some abdominal pain, worse with deep inspiration  He had mild nausea overnight but no vomiting  He was tolerating p o  Intake well, drinking  No flatus yet  Review of Systems   Constitutional: Negative for activity change and appetite change  HENT: Negative for congestion and ear pain  Eyes: Negative for pain  Respiratory: Negative for cough and shortness of breath  Cardiovascular: Negative for chest pain and palpitations  Gastrointestinal: Positive for abdominal distention and abdominal pain  Negative for blood in stool, constipation, diarrhea and nausea  Genitourinary: Negative for difficulty urinating, dysuria, flank pain and hematuria  Musculoskeletal: Negative for arthralgias and myalgias  Skin: Negative for rash  Allergic/Immunologic: Negative for immunocompromised state  Neurological: Negative for dizziness and headaches  Hematological: Negative for adenopathy  Does not bruise/bleed easily  Psychiatric/Behavioral: Negative for agitation  The patient is not nervous/anxious  Objective:  Vitals: Blood pressure 134/74, pulse 70, temperature 99 7 °F (37 6 °C), temperature source Temporal, resp  rate 18, height 5' 10" (1 778 m), weight 88 5 kg (195 lb), SpO2 98 %  ,Body mass index is 27 98 kg/m²  Intake/Output Summary (Last 24 hours) at 9/5/2019 1141  Last data filed at 9/5/2019 0957  Gross per 24 hour   Intake 4693 75 ml   Output 2265 ml   Net 2428 75 ml     Invasive Devices     Peripheral Intravenous Line            Peripheral IV 09/04/19 Left Forearm 1 day    Peripheral IV 09/04/19 Right Hand less than 1 day          Drain            Closed/Suction Drain Left;Midline Abdomen Bulb 19 Fr  less than 1 day    Ureteral Drain/Stent Left ureter 6 Fr  less than 1 day    Ureteral Drain/Stent Left ureter 6 Fr  less than 1 day    Urethral Catheter Latex 20 Fr  less than 1 day                Physical Exam   Constitutional: He is oriented to person, place, and time  He appears well-developed and well-nourished  He is cooperative  He does not appear ill  No distress  Pleasant well-appearing 21-year-old male, sitting upright in bed with nasal cannula oxygen in place  Chatty, answering questions appropriately throughout exam    HENT:   Head: Normocephalic and atraumatic  Moist mucous membranes  Eyes: Conjunctivae and EOM are normal    Neck: Normal range of motion  Neck supple  No tracheal deviation present  Cardiovascular: Normal rate, regular rhythm and normal heart sounds  No murmur heard  Pulmonary/Chest: Effort normal and breath sounds normal  No respiratory distress  He has no wheezes  Good airflow bilaterally on deep inspiration  Abdominal: Soft   Bowel sounds are normal  He exhibits no distension and no mass  There is no tenderness  Abdomen soft and benign  Expected hector-incisional tenderness around incisions on postoperative day 1  Left-sided GILBERT drain with scant serosanguineous fluid noted  Active bowel sounds x4 quadrants  Mild distention noted  Genitourinary:   Genitourinary Comments: Albarado catheter draining clear arnel urine  Musculoskeletal: Normal range of motion  He exhibits no edema  Neurological: He is alert and oriented to person, place, and time  Skin: Skin is warm and dry  No rash noted  He is not diaphoretic  No erythema  No pallor  Psychiatric: He has a normal mood and affect  His behavior is normal  Judgment and thought content normal    Nursing note and vitals reviewed        History:    Past Medical History:   Diagnosis Date    Arthritis     mostly hands    Bladder stone     Blue baby     at birth /with heart murmur/no longer noted    Carpal tunnel syndrome     Chronic pain disorder     "hands"    Generalized joint pain     GERD (gastroesophageal reflux disease)     "silent"    Herniated disc, cervical     some neck pain    History of kidney stones     [place] (hard of hearing)     "slightly"    Hyperlipidemia     Moderate exercise     "very active lifestyle/walking/at least 4 miles per day/active job"    PONV (postoperative nausea and vomiting)     "almost every time after surgery"    Prostate cancer ([facility] [address] )     Rosacea     Skin cancer     Tinnitus      Past Surgical History:   Procedure Laterality Date    BACK SURGERY      HERNIATED DISC/Lumbar    CARPAL TUNNEL RELEASE Bilateral     COLONOSCOPY      HERNIA REPAIR Right     inguinal    NEUROPLASTY / TRANSPOSITION MEDIAN NERVE AT CARPAL TUNNEL BILATERAL      [place] LAP,PROSTATECTOMY,RADICAL,W/NERVE SPARE,INCL ROBOTIC N/A 9/4/2019    Procedure: PROSTATECTOMY RADICAL W ROBOTICS; BILATERAL PELVIC LYMPH NODE DISSECTION LYSIS OF ADHESIONS Robotic; DOUBLE LEFT URETERAL STENT INSERTION with retrograde pyelogram, Instillation of ICG; left upper pole ureteral re-implantation Robotic;  Surgeon: Tameka Marquez MD;  Location: [facility];  Service: Urology    [address]      x2 on nose for melanoma    TONSILLECTOMY       Family History   Problem Relation Age of Onset    Hypertension Mother     Stroke Father      Social History     Socioeconomic History    Marital status: /Civil Union     Spouse name: None    Number of children: None    Years of education: None    Highest education level: None   Occupational History    Occupation: EMPLOYED   Social Needs    Financial resource strain: None    Food insecurity:     Worry: None     Inability: None    Transportation needs:     Medical: None     Non-medical: None   Tobacco Use    Smoking status: Never Smoker    Smokeless tobacco: Never Used   Substance and Sexual Activity    Alcohol use:  Yes     Alcohol/week: 1 0 standard drinks     Types: 1 Glasses of wine per week     Frequency: 2-4 times a month    Drug use: No    Sexual activity: None   Lifestyle    Physical activity:     Days per week: None     Minutes per session: None    Stress: None   Relationships    Social connections:     Talks on phone: None     Gets together: None     Attends Jew service: None     Active member of club or organization: None     Attends meetings of clubs or organizations: None     Relationship status: None    Intimate partner violence:     Fear of current or ex partner: None     Emotionally abused: None     Physically abused: None     Forced sexual activity: None   Other Topics Concern    None   Social History Narrative        EMPLOYED       Labs:  Recent Labs     09/04/19 1924 09/05/19  0431   WBC 18 67* 12 47*     Recent Labs     09/04/19 1924 09/05/19  0431   HGB 13 6 12 7       Recent Labs     09/04/19 1924 09/05/19  0431   CREATININE 1 56* 1 23       Hira Dorado PA-C  Date: 9/5/2019 Time: 11:41 AM

## 2019-09-05 NOTE — PLAN OF CARE
Problem: Nutrition/Hydration-ADULT  Goal: Nutrient/Hydration intake appropriate for improving, restoring or maintaining nutritional needs  Description  Monitor and assess patient's nutrition/hydration status for malnutrition  Collaborate with interdisciplinary team and initiate plan and interventions as ordered  Monitor patient's weight and dietary intake as ordered or per policy  Utilize nutrition screening tool and intervene as necessary  Determine patient's food preferences and provide high-protein, high-caloric foods as appropriate       INTERVENTIONS:  - Monitor oral intake, urinary output, labs, and treatment plans  - Assess nutrition and hydration status and recommend course of action  - Evaluate amount of meals eaten  - Assist patient with eating if necessary   - Allow adequate time for meals  - Recommend/ encourage appropriate diets, oral nutritional supplements, and vitamin/mineral supplements  - Order, calculate, and assess calorie counts as needed  - Recommend, monitor, and adjust tube feedings and TPN/PPN based on assessed needs  - Assess need for intravenous fluids  - Provide specific nutrition/hydration education as appropriate  - Include patient/family/caregiver in decisions related to nutrition  Outcome: Progressing     Problem: PAIN - ADULT  Goal: Verbalizes/displays adequate comfort level or baseline comfort level  Description  Interventions:  - Encourage patient to monitor pain and request assistance  - Assess pain using appropriate pain scale  - Administer analgesics based on type and severity of pain and evaluate response  - Implement non-pharmacological measures as appropriate and evaluate response  - Consider cultural and social influences on pain and pain management  - Notify physician/advanced practitioner if interventions unsuccessful or patient reports new pain  Outcome: Progressing     Problem: INFECTION - ADULT  Goal: Absence or prevention of progression during hospitalization  Description  INTERVENTIONS:  - Assess and monitor for signs and symptoms of infection  - Monitor lab/diagnostic results  - Monitor all insertion sites, i e  indwelling lines, tubes, and drains  - Monitor endotracheal if appropriate and nasal secretions for changes in amount and color  - [place] appropriate cooling/warming therapies per order  - Administer medications as ordered  - Instruct and encourage patient and family to use good hand hygiene technique  - Identify and instruct in appropriate isolation precautions for identified infection/condition  Outcome: Progressing  Goal: Absence of fever/infection during neutropenic period  Description  INTERVENTIONS:  - Monitor WBC    Outcome: Progressing     Problem: SAFETY ADULT  Goal: Patient will remain free of falls  Description  INTERVENTIONS:  - Assess patient frequently for physical needs  -  Identify cognitive and physical deficits and behaviors that affect risk of falls    -  [place] fall precautions as indicated by assessment   - Educate patient/family on patient safety including physical limitations  - Instruct patient to call for assistance with activity based on assessment  - Modify environment to reduce risk of injury  - Consider OT/PT consult to assist with strengthening/mobility  Outcome: Progressing  Goal: Maintain or return to baseline ADL function  Description  INTERVENTIONS:  -  Assess patient's ability to carry out ADLs; assess patient's baseline for ADL function and identify physical deficits which impact ability to perform ADLs (bathing, care of mouth/teeth, toileting, grooming, dressing, etc )  - Assess/evaluate cause of self-care deficits   - Assess range of motion  - Assess patient's mobility; develop plan if impaired  - Assess patient's need for assistive devices and provide as appropriate  - Encourage maximum independence but intervene and supervise when necessary  - Involve family in performance of ADLs  - Assess for home care needs following discharge   - Consider OT consult to assist with ADL evaluation and planning for discharge  - Provide patient education as appropriate  Outcome: Progressing  Goal: Maintain or return mobility status to optimal level  Description  INTERVENTIONS:  - Assess patient's baseline mobility status (ambulation, transfers, stairs, etc )    - Identify cognitive and physical deficits and behaviors that affect mobility  - Identify mobility aids required to assist with transfers and/or ambulation (gait belt, sit-to-stand, lift, walker, cane, etc )  - [place] fall precautions as indicated by assessment  - Record patient progress and toleration of activity level on Mobility SBAR; progress patient to next Phase/Stage  - Instruct patient to call for assistance with activity based on assessment  - Consider rehabilitation consult to assist with strengthening/weightbearing, etc   Outcome: Progressing     Problem: DISCHARGE PLANNING  Goal: Discharge to home or other facility with appropriate resources  Description  INTERVENTIONS:  - Identify barriers to discharge w/patient and caregiver  - Arrange for needed discharge resources and transportation as appropriate  - Identify discharge learning needs (meds, wound care, etc )  - Arrange for interpretive services to assist at discharge as needed  - Refer to Case Management Department for coordinating discharge planning if the patient needs post-hospital services based on physician/advanced practitioner order or complex needs related to functional status, cognitive ability, or social support system  Outcome: Progressing     Problem: GENITOURINARY - ADULT  Goal: Maintains or returns to baseline urinary function  Description  INTERVENTIONS:  - Assess urinary function  - Encourage oral fluids to ensure adequate hydration if ordered  - Administer IV fluids as ordered to ensure adequate hydration  - Administer ordered medications as needed  - Offer frequent toileting  - Follow urinary retention protocol if ordered  Outcome: Progressing  Goal: Absence of urinary retention  Description  INTERVENTIONS:  - Assess patients ability to void and empty bladder  - Monitor I/O  - Bladder scan as needed  - Discuss with physician/AP medications to alleviate retention as needed  - Discuss catheterization for long term situations as appropriate  Outcome: Progressing  Goal: Urinary catheter remains patent  Description  INTERVENTIONS:  - Assess patency of urinary catheter  - If patient has a chronic low, consider changing catheter if non-functioning  - Follow guidelines for intermittent irrigation of non-functioning urinary catheter  Outcome: Progressing     Problem: SKIN/TISSUE INTEGRITY - ADULT  Goal: Skin integrity remains intact  Description  INTERVENTIONS  - Identify patients at risk for skin breakdown  - Assess and monitor skin integrity  - Assess and monitor nutrition and hydration status  - Monitor labs (i e  albumin)  - Assess for incontinence   - Turn and reposition patient  - Assist with mobility/ambulation  - Relieve pressure over bony prominences  - Avoid friction and shearing  - Provide appropriate hygiene as needed including keeping skin clean and dry  - Evaluate need for skin moisturizer/barrier cream  - Collaborate with interdisciplinary team (i e  Nutrition, Rehabilitation, etc )   - Patient/family teaching  Outcome: Progressing  Goal: Incision(s), wounds(s) or drain site(s) healing without S/S of infection  Description  INTERVENTIONS  - Assess and document risk factors for skin impairment   - Assess and document dressing, incision, wound bed, drain sites and surrounding tissue  - Consider nutrition services referral as needed  - Oral mucous membranes remain intact  - Provide patient/ family education  Outcome: Progressing  Goal: Oral mucous membranes remain intact  Description  INTERVENTIONS  - Assess oral mucosa and hygiene practices  - Implement preventative oral hygiene regimen  - Implement oral medicated treatments as ordered  - Initiate Nutrition services referral as needed  Outcome: Progressing

## 2019-09-06 ENCOUNTER — TELEPHONE (OUTPATIENT)
Dept: UROLOGY | Facility: MEDICAL CENTER | Age: 64
End: 2019-09-06

## 2019-09-06 ENCOUNTER — TELEPHONE (OUTPATIENT)
Dept: INTERNAL MEDICINE CLINIC | Facility: CLINIC | Age: 64
End: 2019-09-06

## 2019-09-06 VITALS
HEIGHT: 70 IN | OXYGEN SATURATION: 95 % | DIASTOLIC BLOOD PRESSURE: 88 MMHG | SYSTOLIC BLOOD PRESSURE: 176 MMHG | HEART RATE: 70 BPM | TEMPERATURE: 98.7 F | BODY MASS INDEX: 27.92 KG/M2 | RESPIRATION RATE: 18 BRPM | WEIGHT: 195 LBS

## 2019-09-06 LAB
ANION GAP SERPL CALCULATED.3IONS-SCNC: 6 MMOL/L (ref 4–13)
BASOPHILS # BLD AUTO: 0.04 THOUSANDS/ΜL (ref 0–0.1)
BASOPHILS NFR BLD AUTO: 0 % (ref 0–1)
BUN SERPL-MCNC: 15 MG/DL (ref 5–25)
CALCIUM SERPL-MCNC: 8.5 MG/DL (ref 8.3–10.1)
CHLORIDE SERPL-SCNC: 105 MMOL/L (ref 100–108)
CO2 SERPL-SCNC: 28 MMOL/L (ref 21–32)
CREAT SERPL-MCNC: 1.15 MG/DL (ref 0.6–1.3)
EOSINOPHIL # BLD AUTO: 0 THOUSAND/ΜL (ref 0–0.61)
EOSINOPHIL NFR BLD AUTO: 0 % (ref 0–6)
ERYTHROCYTE [DISTWIDTH] IN BLOOD BY AUTOMATED COUNT: 13.2 % (ref 11.6–15.1)
GFR SERPL CREATININE-BSD FRML MDRD: 68 ML/MIN/1.73SQ M
GLUCOSE P FAST SERPL-MCNC: 112 MG/DL (ref 65–99)
GLUCOSE SERPL-MCNC: 112 MG/DL (ref 65–140)
HCT VFR BLD AUTO: 35.4 % (ref 36.5–49.3)
HGB BLD-MCNC: 11.5 G/DL (ref 12–17)
IMM GRANULOCYTES # BLD AUTO: 0.05 THOUSAND/UL (ref 0–0.2)
IMM GRANULOCYTES NFR BLD AUTO: 1 % (ref 0–2)
LYMPHOCYTES # BLD AUTO: 0.78 THOUSANDS/ΜL (ref 0.6–4.47)
LYMPHOCYTES NFR BLD AUTO: 7 % (ref 14–44)
MCH RBC QN AUTO: 30.6 PG (ref 26.8–34.3)
MCHC RBC AUTO-ENTMCNC: 32.5 G/DL (ref 31.4–37.4)
MCV RBC AUTO: 94 FL (ref 82–98)
MONOCYTES # BLD AUTO: 0.75 THOUSAND/ΜL (ref 0.17–1.22)
MONOCYTES NFR BLD AUTO: 7 % (ref 4–12)
NEUTROPHILS # BLD AUTO: 9.4 THOUSANDS/ΜL (ref 1.85–7.62)
NEUTS SEG NFR BLD AUTO: 85 % (ref 43–75)
NRBC BLD AUTO-RTO: 0 /100 WBCS
PLATELET # BLD AUTO: 172 THOUSANDS/UL (ref 149–390)
PMV BLD AUTO: 9.2 FL (ref 8.9–12.7)
POTASSIUM SERPL-SCNC: 4.3 MMOL/L (ref 3.5–5.3)
RBC # BLD AUTO: 3.76 MILLION/UL (ref 3.88–5.62)
SODIUM SERPL-SCNC: 139 MMOL/L (ref 136–145)
WBC # BLD AUTO: 11.02 THOUSAND/UL (ref 4.31–10.16)

## 2019-09-06 PROCEDURE — 80048 BASIC METABOLIC PNL TOTAL CA: CPT | Performed by: UROLOGY

## 2019-09-06 PROCEDURE — 99024 POSTOP FOLLOW-UP VISIT: CPT | Performed by: PHYSICIAN ASSISTANT

## 2019-09-06 PROCEDURE — 85025 COMPLETE CBC W/AUTO DIFF WBC: CPT | Performed by: UROLOGY

## 2019-09-06 PROCEDURE — 99024 POSTOP FOLLOW-UP VISIT: CPT | Performed by: UROLOGY

## 2019-09-06 RX ORDER — ONDANSETRON 4 MG/1
4 TABLET, ORALLY DISINTEGRATING ORAL EVERY 4 HOURS PRN
Qty: 15 TABLET | Refills: 0 | Status: SHIPPED | OUTPATIENT
Start: 2019-09-06 | End: 2020-12-21

## 2019-09-06 RX ORDER — FAMOTIDINE 20 MG/1
20 TABLET, FILM COATED ORAL 2 TIMES DAILY
Status: DISCONTINUED | OUTPATIENT
Start: 2019-09-06 | End: 2019-09-06 | Stop reason: HOSPADM

## 2019-09-06 RX ORDER — SIMETHICONE 80 MG
80 TABLET,CHEWABLE ORAL 4 TIMES DAILY PRN
Qty: 30 TABLET | Refills: 0 | Status: SHIPPED | OUTPATIENT
Start: 2019-09-06 | End: 2020-12-21

## 2019-09-06 RX ORDER — METOCLOPRAMIDE HYDROCHLORIDE 5 MG/ML
10 INJECTION INTRAMUSCULAR; INTRAVENOUS EVERY 6 HOURS PRN
Status: DISCONTINUED | OUTPATIENT
Start: 2019-09-06 | End: 2019-09-06 | Stop reason: HOSPADM

## 2019-09-06 RX ADMIN — FAMOTIDINE 20 MG: 20 TABLET ORAL at 08:41

## 2019-09-06 RX ADMIN — ONDANSETRON 4 MG: 2 INJECTION INTRAMUSCULAR; INTRAVENOUS at 07:28

## 2019-09-06 RX ADMIN — DOCUSATE SODIUM 100 MG: 100 CAPSULE, LIQUID FILLED ORAL at 08:40

## 2019-09-06 RX ADMIN — HYDROCODONE BITARTRATE AND ACETAMINOPHEN 1 TABLET: 5; 325 TABLET ORAL at 14:47

## 2019-09-06 RX ADMIN — SIMETHICONE CHEW TAB 80 MG 80 MG: 80 TABLET ORAL at 05:56

## 2019-09-06 RX ADMIN — SODIUM CHLORIDE, SODIUM LACTATE, POTASSIUM CHLORIDE, AND CALCIUM CHLORIDE 100 ML/HR: .6; .31; .03; .02 INJECTION, SOLUTION INTRAVENOUS at 00:00

## 2019-09-06 RX ADMIN — SIMETHICONE CHEW TAB 80 MG 80 MG: 80 TABLET ORAL at 11:00

## 2019-09-06 RX ADMIN — METOCLOPRAMIDE 10 MG: 5 INJECTION, SOLUTION INTRAMUSCULAR; INTRAVENOUS at 06:49

## 2019-09-06 RX ADMIN — OXYBUTYNIN CHLORIDE 5 MG: 5 TABLET, EXTENDED RELEASE ORAL at 08:40

## 2019-09-06 RX ADMIN — HYDROCODONE BITARTRATE AND ACETAMINOPHEN 2 TABLET: 5; 325 TABLET ORAL at 07:31

## 2019-09-06 RX ADMIN — SODIUM CHLORIDE, SODIUM LACTATE, POTASSIUM CHLORIDE, AND CALCIUM CHLORIDE 100 ML/HR: .6; .31; .03; .02 INJECTION, SOLUTION INTRAVENOUS at 11:01

## 2019-09-06 RX ADMIN — HYDROCODONE BITARTRATE AND ACETAMINOPHEN 1 TABLET: 5; 325 TABLET ORAL at 11:00

## 2019-09-06 RX ADMIN — ONDANSETRON 4 MG: 2 INJECTION INTRAMUSCULAR; INTRAVENOUS at 00:04

## 2019-09-06 RX ADMIN — ENOXAPARIN SODIUM 40 MG: 40 INJECTION SUBCUTANEOUS at 08:41

## 2019-09-06 NOTE — PLAN OF CARE
Problem: Nutrition/Hydration-ADULT  Goal: Nutrient/Hydration intake appropriate for improving, restoring or maintaining nutritional needs  Description  Monitor and assess patient's nutrition/hydration status for malnutrition  Collaborate with interdisciplinary team and initiate plan and interventions as ordered  Monitor patient's weight and dietary intake as ordered or per policy  Utilize nutrition screening tool and intervene as necessary  Determine patient's food preferences and provide high-protein, high-caloric foods as appropriate       INTERVENTIONS:  - Monitor oral intake, urinary output, labs, and treatment plans  - Assess nutrition and hydration status and recommend course of action  - Evaluate amount of meals eaten  - Assist patient with eating if necessary   - Allow adequate time for meals  - Recommend/ encourage appropriate diets, oral nutritional supplements, and vitamin/mineral supplements  - Order, calculate, and assess calorie counts as needed  - Recommend, monitor, and adjust tube feedings and TPN/PPN based on assessed needs  - Assess need for intravenous fluids  - Provide specific nutrition/hydration education as appropriate  - Include patient/family/caregiver in decisions related to nutrition  9/6/2019 1513 by Keeley Lu RN  Outcome: Completed  9/6/2019 0801 by Keeley Lu RN  Outcome: Progressing     Problem: PAIN - ADULT  Goal: Verbalizes/displays adequate comfort level or baseline comfort level  Description  Interventions:  - Encourage patient to monitor pain and request assistance  - Assess pain using appropriate pain scale  - Administer analgesics based on type and severity of pain and evaluate response  - Implement non-pharmacological measures as appropriate and evaluate response  - Consider cultural and social influences on pain and pain management  - Notify physician/advanced practitioner if interventions unsuccessful or patient reports new pain  9/6/2019 1513 by Keeley Lu RN  Outcome: Completed  9/6/2019 0801 by Tacho Chambers RN  Outcome: Progressing     Problem: INFECTION - ADULT  Goal: Absence or prevention of progression during hospitalization  Description  INTERVENTIONS:  - Assess and monitor for signs and symptoms of infection  - Monitor lab/diagnostic results  - Monitor all insertion sites, i e  indwelling lines, tubes, and drains  - Monitor endotracheal if appropriate and nasal secretions for changes in amount and color  - [place] appropriate cooling/warming therapies per order  - Administer medications as ordered  - Instruct and encourage patient and family to use good hand hygiene technique  - Identify and instruct in appropriate isolation precautions for identified infection/condition  9/6/2019 1513 by Tacho Chambers RN  Outcome: Completed  9/6/2019 0801 by Tacho Chambers RN  Outcome: Progressing  Goal: Absence of fever/infection during neutropenic period  Description  INTERVENTIONS:  - Monitor WBC    9/6/2019 1513 by Tacho Chambers RN  Outcome: Completed  9/6/2019 0801 by Tacho hCambers RN  Outcome: Progressing     Problem: SAFETY ADULT  Goal: Patient will remain free of falls  Description  INTERVENTIONS:  - Assess patient frequently for physical needs  -  Identify cognitive and physical deficits and behaviors that affect risk of falls    -  [place] fall precautions as indicated by assessment   - Educate patient/family on patient safety including physical limitations  - Instruct patient to call for assistance with activity based on assessment  - Modify environment to reduce risk of injury  - Consider OT/PT consult to assist with strengthening/mobility  9/6/2019 1513 by Tacho Chambers RN  Outcome: Completed  9/6/2019 0801 by Tacho Chambers RN  Outcome: Progressing  Goal: Maintain or return to baseline ADL function  Description  INTERVENTIONS:  -  Assess patient's ability to carry out ADLs; assess patient's baseline for ADL function and identify physical deficits which impact ability to perform ADLs (bathing, care of mouth/teeth, toileting, grooming, dressing, etc )  - Assess/evaluate cause of self-care deficits   - Assess range of motion  - Assess patient's mobility; develop plan if impaired  - Assess patient's need for assistive devices and provide as appropriate  - Encourage maximum independence but intervene and supervise when necessary  - Involve family in performance of ADLs  - Assess for home care needs following discharge   - Consider OT consult to assist with ADL evaluation and planning for discharge  - Provide patient education as appropriate  9/6/2019 1513 by Ansley Mejia RN  Outcome: Completed  9/6/2019 0801 by Ansley Mejia RN  Outcome: Progressing  Goal: Maintain or return mobility status to optimal level  Description  INTERVENTIONS:  - Assess patient's baseline mobility status (ambulation, transfers, stairs, etc )    - Identify cognitive and physical deficits and behaviors that affect mobility  - Identify mobility aids required to assist with transfers and/or ambulation (gait belt, sit-to-stand, lift, walker, cane, etc )  - [place] fall precautions as indicated by assessment  - Record patient progress and toleration of activity level on Mobility SBAR; progress patient to next Phase/Stage  - Instruct patient to call for assistance with activity based on assessment  - Consider rehabilitation consult to assist with strengthening/weightbearing, etc   9/6/2019 1513 by Ansley Mejia RN  Outcome: Completed  9/6/2019 0801 by Ansley Mejia RN  Outcome: Progressing     Problem: DISCHARGE PLANNING  Goal: Discharge to home or other facility with appropriate resources  Description  INTERVENTIONS:  - Identify barriers to discharge w/patient and caregiver  - Arrange for needed discharge resources and transportation as appropriate  - Identify discharge learning needs (meds, wound care, etc )  - Arrange for interpretive services to assist at discharge as needed  - Refer to Case Management Department for coordinating discharge planning if the patient needs post-hospital services based on physician/advanced practitioner order or complex needs related to functional status, cognitive ability, or social support system  9/6/2019 1513 by Ton Lyons RN  Outcome: Completed  9/6/2019 0801 by Ton Lyons RN  Outcome: Progressing     Problem: GENITOURINARY - ADULT  Goal: Maintains or returns to baseline urinary function  Description  INTERVENTIONS:  - Assess urinary function  - Encourage oral fluids to ensure adequate hydration if ordered  - Administer IV fluids as ordered to ensure adequate hydration  - Administer ordered medications as needed  - Offer frequent toileting  - Follow urinary retention protocol if ordered  9/6/2019 1513 by Ton Lyons RN  Outcome: Completed  9/6/2019 0801 by Ton Lyons RN  Outcome: Progressing  Goal: Absence of urinary retention  Description  INTERVENTIONS:  - Assess patients ability to void and empty bladder  - Monitor I/O  - Bladder scan as needed  - Discuss with physician/AP medications to alleviate retention as needed  - Discuss catheterization for long term situations as appropriate  9/6/2019 1513 by Ton Lyons RN  Outcome: Completed  9/6/2019 0801 by Ton Lyons RN  Outcome: Progressing  Goal: Urinary catheter remains patent  Description  INTERVENTIONS:  - Assess patency of urinary catheter  - If patient has a chronic low, consider changing catheter if non-functioning  - Follow guidelines for intermittent irrigation of non-functioning urinary catheter  9/6/2019 1513 by Ton Lyons RN  Outcome: Completed  9/6/2019 0801 by Ton Lyons RN  Outcome: Progressing     Problem: SKIN/TISSUE INTEGRITY - ADULT  Goal: Skin integrity remains intact  Description  INTERVENTIONS  - Identify patients at risk for skin breakdown  - Assess and monitor skin integrity  - Assess and monitor nutrition and hydration status  - Monitor labs (i e  albumin)  - Assess for incontinence   - Turn and reposition patient  - Assist with mobility/ambulation  - Relieve pressure over bony prominences  - Avoid friction and shearing  - Provide appropriate hygiene as needed including keeping skin clean and dry  - Evaluate need for skin moisturizer/barrier cream  - Collaborate with interdisciplinary team (i e  Nutrition, Rehabilitation, etc )   - Patient/family teaching  9/6/2019 1513 by Sharif Daigle RN  Outcome: Completed  9/6/2019 0801 by Sharif Daigle RN  Outcome: Progressing  Goal: Incision(s), wounds(s) or drain site(s) healing without S/S of infection  Description  INTERVENTIONS  - Assess and document risk factors for skin impairment   - Assess and document dressing, incision, wound bed, drain sites and surrounding tissue  - Consider nutrition services referral as needed  - Oral mucous membranes remain intact  - Provide patient/ family education  9/6/2019 1513 by Sharif Daigle RN  Outcome: Completed  9/6/2019 0801 by Sharif Daigle RN  Outcome: Progressing  Goal: Oral mucous membranes remain intact  Description  INTERVENTIONS  - Assess oral mucosa and hygiene practices  - Implement preventative oral hygiene regimen  - Implement oral medicated treatments as ordered  - Initiate Nutrition services referral as needed  9/6/2019 1513 by Sharif Daigle RN  Outcome: Completed  9/6/2019 0801 by Sharif Daigle RN  Outcome: Progressing     Problem: Potential for Falls  Goal: Patient will remain free of falls  Description  INTERVENTIONS:  - Assess patient frequently for physical needs  -  Identify cognitive and physical deficits and behaviors that affect risk of falls    -  [place] fall precautions as indicated by assessment   - Educate patient/family on patient safety including physical limitations  - Instruct patient to call for assistance with activity based on assessment  - Modify environment to reduce risk of injury  - Consider OT/PT consult to assist with strengthening/mobility  9/6/2019 1513 by Sharif Daigle RN  Outcome: Completed  9/6/2019 0801 by Bria Vargas RN  Outcome: Progressing

## 2019-09-06 NOTE — PLAN OF CARE
Problem: Nutrition/Hydration-ADULT  Goal: Nutrient/Hydration intake appropriate for improving, restoring or maintaining nutritional needs  Description  Monitor and assess patient's nutrition/hydration status for malnutrition  Collaborate with interdisciplinary team and initiate plan and interventions as ordered  Monitor patient's weight and dietary intake as ordered or per policy  Utilize nutrition screening tool and intervene as necessary  Determine patient's food preferences and provide high-protein, high-caloric foods as appropriate       INTERVENTIONS:  - Monitor oral intake, urinary output, labs, and treatment plans  - Assess nutrition and hydration status and recommend course of action  - Evaluate amount of meals eaten  - Assist patient with eating if necessary   - Allow adequate time for meals  - Recommend/ encourage appropriate diets, oral nutritional supplements, and vitamin/mineral supplements  - Order, calculate, and assess calorie counts as needed  - Recommend, monitor, and adjust tube feedings and TPN/PPN based on assessed needs  - Assess need for intravenous fluids  - Provide specific nutrition/hydration education as appropriate  - Include patient/family/caregiver in decisions related to nutrition  Outcome: Progressing     Problem: PAIN - ADULT  Goal: Verbalizes/displays adequate comfort level or baseline comfort level  Description  Interventions:  - Encourage patient to monitor pain and request assistance  - Assess pain using appropriate pain scale  - Administer analgesics based on type and severity of pain and evaluate response  - Implement non-pharmacological measures as appropriate and evaluate response  - Consider cultural and social influences on pain and pain management  - Notify physician/advanced practitioner if interventions unsuccessful or patient reports new pain  Outcome: Progressing     Problem: INFECTION - ADULT  Goal: Absence or prevention of progression during hospitalization  Description  INTERVENTIONS:  - Assess and monitor for signs and symptoms of infection  - Monitor lab/diagnostic results  - Monitor all insertion sites, i e  indwelling lines, tubes, and drains  - Monitor endotracheal if appropriate and nasal secretions for changes in amount and color  - [place] appropriate cooling/warming therapies per order  - Administer medications as ordered  - Instruct and encourage patient and family to use good hand hygiene technique  - Identify and instruct in appropriate isolation precautions for identified infection/condition  Outcome: Progressing  Goal: Absence of fever/infection during neutropenic period  Description  INTERVENTIONS:  - Monitor WBC    Outcome: Progressing     Problem: SAFETY ADULT  Goal: Patient will remain free of falls  Description  INTERVENTIONS:  - Assess patient frequently for physical needs  -  Identify cognitive and physical deficits and behaviors that affect risk of falls    -  [place] fall precautions as indicated by assessment   - Educate patient/family on patient safety including physical limitations  - Instruct patient to call for assistance with activity based on assessment  - Modify environment to reduce risk of injury  - Consider OT/PT consult to assist with strengthening/mobility  Outcome: Progressing  Goal: Maintain or return to baseline ADL function  Description  INTERVENTIONS:  -  Assess patient's ability to carry out ADLs; assess patient's baseline for ADL function and identify physical deficits which impact ability to perform ADLs (bathing, care of mouth/teeth, toileting, grooming, dressing, etc )  - Assess/evaluate cause of self-care deficits   - Assess range of motion  - Assess patient's mobility; develop plan if impaired  - Assess patient's need for assistive devices and provide as appropriate  - Encourage maximum independence but intervene and supervise when necessary  - Involve family in performance of ADLs  - Assess for home care needs following discharge   - Consider OT consult to assist with ADL evaluation and planning for discharge  - Provide patient education as appropriate  Outcome: Progressing  Goal: Maintain or return mobility status to optimal level  Description  INTERVENTIONS:  - Assess patient's baseline mobility status (ambulation, transfers, stairs, etc )    - Identify cognitive and physical deficits and behaviors that affect mobility  - Identify mobility aids required to assist with transfers and/or ambulation (gait belt, sit-to-stand, lift, walker, cane, etc )  - [place] fall precautions as indicated by assessment  - Record patient progress and toleration of activity level on Mobility SBAR; progress patient to next Phase/Stage  - Instruct patient to call for assistance with activity based on assessment  - Consider rehabilitation consult to assist with strengthening/weightbearing, etc   Outcome: Progressing     Problem: DISCHARGE PLANNING  Goal: Discharge to home or other facility with appropriate resources  Description  INTERVENTIONS:  - Identify barriers to discharge w/patient and caregiver  - Arrange for needed discharge resources and transportation as appropriate  - Identify discharge learning needs (meds, wound care, etc )  - Arrange for interpretive services to assist at discharge as needed  - Refer to Case Management Department for coordinating discharge planning if the patient needs post-hospital services based on physician/advanced practitioner order or complex needs related to functional status, cognitive ability, or social support system  Outcome: Progressing     Problem: GENITOURINARY - ADULT  Goal: Maintains or returns to baseline urinary function  Description  INTERVENTIONS:  - Assess urinary function  - Encourage oral fluids to ensure adequate hydration if ordered  - Administer IV fluids as ordered to ensure adequate hydration  - Administer ordered medications as needed  - Offer frequent toileting  - Follow urinary retention protocol if ordered  Outcome: Progressing  Goal: Absence of urinary retention  Description  INTERVENTIONS:  - Assess patients ability to void and empty bladder  - Monitor I/O  - Bladder scan as needed  - Discuss with physician/AP medications to alleviate retention as needed  - Discuss catheterization for long term situations as appropriate  Outcome: Progressing  Goal: Urinary catheter remains patent  Description  INTERVENTIONS:  - Assess patency of urinary catheter  - If patient has a chronic low, consider changing catheter if non-functioning  - Follow guidelines for intermittent irrigation of non-functioning urinary catheter  Outcome: Progressing     Problem: SKIN/TISSUE INTEGRITY - ADULT  Goal: Skin integrity remains intact  Description  INTERVENTIONS  - Identify patients at risk for skin breakdown  - Assess and monitor skin integrity  - Assess and monitor nutrition and hydration status  - Monitor labs (i e  albumin)  - Assess for incontinence   - Turn and reposition patient  - Assist with mobility/ambulation  - Relieve pressure over bony prominences  - Avoid friction and shearing  - Provide appropriate hygiene as needed including keeping skin clean and dry  - Evaluate need for skin moisturizer/barrier cream  - Collaborate with interdisciplinary team (i e  Nutrition, Rehabilitation, etc )   - Patient/family teaching  Outcome: Progressing  Goal: Incision(s), wounds(s) or drain site(s) healing without S/S of infection  Description  INTERVENTIONS  - Assess and document risk factors for skin impairment   - Assess and document dressing, incision, wound bed, drain sites and surrounding tissue  - Consider nutrition services referral as needed  - Oral mucous membranes remain intact  - Provide patient/ family education  Outcome: Progressing  Goal: Oral mucous membranes remain intact  Description  INTERVENTIONS  - Assess oral mucosa and hygiene practices  - Implement preventative oral hygiene regimen  - Implement oral medicated treatments as ordered  - Initiate Nutrition services referral as needed  Outcome: Progressing     Problem: Potential for Falls  Goal: Patient will remain free of falls  Description  INTERVENTIONS:  - Assess patient frequently for physical needs  -  Identify cognitive and physical deficits and behaviors that affect risk of falls    -  [place] fall precautions as indicated by assessment   - Educate patient/family on patient safety including physical limitations  - Instruct patient to call for assistance with activity based on assessment  - Modify environment to reduce risk of injury  - Consider OT/PT consult to assist with strengthening/mobility  Outcome: Progressing

## 2019-09-06 NOTE — PROGRESS NOTES
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     Plan:     Vital stable with good urine output and improving creatinine  Drain output low  Patient initially tolerated diet but is having significant LLQ gas pain this AM  Using simethicone, reglan and zofran for control  Await AM labs, if creatinine normalizing, can consider removal of [place] drain  Continue bowel regimen and lovenox/SCDs for prophlyaxis  Will reassess later today to see if pain improves and patient stable for discharge  Subjective:     24 HR EVENTS:   no significant events  Patient has significant complaints of LLQ distention and gas pain        Objective:     VITALS:    Vitals:    09/05/19 2214   BP: 125/71   Pulse: 66   Resp: 18   Temp: 99 4 °F (37 4 °C)   SpO2: 95%       INS & OUTS:  3450cc/24hours  GILBERT 80cc    LABS:  Lab Results   Component Value Date    HGB 12 7 09/05/2019    HCT 39 2 09/05/2019    WBC 12 47 (H) 09/05/2019     09/05/2019   ]    Lab Results   Component Value Date    K 4 4 09/05/2019     09/05/2019    CO2 25 09/05/2019    BUN 17 09/05/2019    CREATININE 1 23 09/05/2019    CALCIUM 8 0 (L) 09/05/2019   ]    INPATIENT MEDS:    Current Facility-Administered Medications:     docusate sodium (COLACE) capsule 100 mg, 100 mg, Oral, BID, Evy Ignacio MD, 100 mg at 09/05/19 1727    enoxaparin (LOVENOX) subcutaneous injection 40 mg, 40 mg, Subcutaneous, Daily, Evy Ignacio MD, 40 mg at 09/05/19 0838    famotidine (PEPCID) tablet 20 mg, 20 mg, Oral, BID, FAIZAN Barrientos    HYDROcodone-acetaminophen [facility]) 5-325 mg per tablet 1 tablet, 1 tablet, Oral, Q4H PRN, Evy Ignacio MD, 1 tablet at 09/05/19 6723    HYDROcodone-acetaminophen (NORCO) 5-325 mg per tablet 2 tablet, 2 tablet, Oral, Q4H PRN, Jocy Meek MD, 2 tablet at 09/06/19 0731    HYDROmorphone (DILAUDID) injection 0 5 mg, 0 5 mg, Intravenous, Q2H PRN, Jocy Meek MD    lactated ringers infusion, 100 mL/hr, Intravenous, Continuous, Jocy Meek MD, Last Rate: 100 mL/hr at 09/06/19 0000, 100 mL/hr at 09/06/19 0000    metoclopramide (REGLAN) injection 10 mg, 10 mg, Intravenous, Q6H PRN, FAIZAN Stewart, 10 mg at 09/06/19 0649    ondansetron (ZOFRAN) injection 4 mg, 4 mg, Intravenous, Q6H PRN, Jocy Meek MD, 4 mg at 09/06/19 0728    oxybutynin (DITROPAN-XL) 24 hr tablet 5 mg, 5 mg, Oral, Daily, Jocy Meek MD, 5 mg at 09/05/19 0838    simethicone (MYLICON) chewable tablet 80 mg, 80 mg, Oral, 4x Daily PRN, Jocy Meek MD, 80 mg at 09/06/19 0556      Physical Exam:   /71 (BP Location: Left arm)   Pulse 66   Temp 99 4 °F (37 4 °C) (Temporal)   Resp 18   Ht 5' 10" (1 778 m)   Wt 88 5 kg (195 lb)   SpO2 95%   BMI 27 98 kg/m²   GEN: Uncomfortable  RESP: Splinting due to pain  CV: no significant peripheral edema  ABD: soft and appropriately tender to palpation, no distention or tympany of percussion  INCISION: clean dry and intact  DRAINS: serosanguineous  JONES: in place draining clear yellow urine

## 2019-09-06 NOTE — PROGRESS NOTES
Patient stating increased pain from gas, as well as nausea; urology made aware at beginning of shift 9/6/2019  Put in orders for medications to assist with pain, gas and nausea  Patient stated minimal relief after given medications  Patient stated intially being reluctant to take pain medication due to increased risk of worsening constipation  RN inquired about level of pain, stated pain was 10/10  RN stated patient should take pain medication at time  Pain reduced to 4/10 after administration  Patient resting in bed, still stating gas causing discomfort  Will continue to monitor      Lizzie Fernando RN 9/6/2019 11:39 AM

## 2019-09-06 NOTE — QUICK NOTE
Treatment Plan - Urology   Wendy Menchaca 66 y o  male MRN: [id number]  Unit/Bed#: [address] [id number] Encounter: [id number]    Contacted by Ashley Diane RN on S2 caring for Christopher Mendez  Feeling well and wishing for discharge home  Add Zofran - sent to [facility] in [facility] pass with other D/C Rxs  Will discharge home now       Billy Mckeon PA-C  Date: 9/6/2019 Time: 2:36 PM

## 2019-09-06 NOTE — UTILIZATION REVIEW
Notification of Observation Care Status/Observation Authorization Request    This is a Notification of Observation Care Status to our facility [facility]  Be advised that this patient was admitted to our facility under Observation Status  Please contact the Utilization Review Department where the patient is receiving care services for additional admission information  Place of Service Code: 25  Place of Service Name: [facility]  CPT Code for Observation: CPT Mindi Carr / CPT 78018    Presentation Date & Time: 9/4/2019  9:59 AM  Observation Admission Date & Time: 09-05-19 @ 1548 pm  Discharge Date & Time: No discharge date for patient encounter  Discharge Disposition (if discharged): Final discharge disposition not confirmed  Attending Physician and NPI#:Jose Cruz Sandy, [address] Rico Cash [[id number]]  Admission Orders (From admission, onward)     Ordered        09/05/19 1548  Place in Observation  Once                   Facility: [address] [place] [address] Utilization Review Department  Phone: [phone]; Fax [phone]  [email or web address]  ATTENTION: Please call with any questions or concerns to [phone]  and carefully listen to the prompts so that you are directed to the right person  Send all requests for admission clinical reviews, approved or denied determinations and any other requests to fax [phone]   All voicemails are confidential

## 2019-09-06 NOTE — TELEPHONE ENCOUNTER
Patient needs TCM appt scheduled  Patient was discharged today from [facility]  Then can we reroute back to us to finish TCM note  Thank you

## 2019-09-06 NOTE — PROGRESS NOTES
Per Dr Katie Ortega, patient should receive nothing per rectum for gas/constipation       Orvel Gigi, RN 9/6/2019  11:43 AM

## 2019-09-06 NOTE — UTILIZATION REVIEW
Elective Surgical Continued Stay Review    Date: 9/6    POD#: 2  Current Patient Class: obs  Current Level of Care: [facility]    Assessment/Plan: 66 y o  male, initial surgery date  9/4 9/6 -    Remains on IVF @  100   norco po x4 yesterday and today @  [phone]   IV reglan @  2461   IV zofran @  0004 and 0728    increased pain from gas, as well as nausea; Is passing gas - ambulation encouraged    Pertinent Labs/Diagnostic Results:  9/4 wbc  18 64  9/5  Wbc  12 47    Vital Signs:   09/05/19 1531  100 2 °F   65  18  126/73     09/06/19 0737  98 7 °F   70  18  176/88     Medications:   Scheduled Meds:   Current Facility-Administered Medications:  docusate sodium 100 mg Oral BID   enoxaparin 40 mg Subcutaneous Daily   famotidine 20 mg Oral BID   HYDROcodone-acetaminophen 1 tablet Oral Q4H PRN   HYDROcodone-acetaminophen 2 tablet Oral Q4H PRN   HYDROmorphone 0 5 mg Intravenous Q2H PRN   lactated ringers 100 mL/hr Intravenous Continuous   metoclopramide 10 mg Intravenous Q6H PRN   ondansetron 4 mg Intravenous Q6H PRN   oxybutynin 5 mg Oral Daily   simethicone 80 mg Oral 4x Daily PRN       Discharge Plan: d     Network Utilization Review Department  Phone: [phone]; Fax [phone]  [email or web address]  ATTENTION: Please call with any questions or concerns to [phone]  and carefully listen to the prompts so that you are directed to the right person  Send all requests for admission clinical reviews, approved or denied determinations and any other requests to fax [phone]   All voicemails are confidential

## 2019-09-06 NOTE — DISCHARGE SUMMARY
Discharge Summary - Magy Love 66 y o  male MRN: [id number]    Unit/Bed#: [address] 202[address]01 Encounter: [id number]    Admission Date: 9/4/2019     Discharge Date: 09/06/19    HPI: Magy Love is a 66 y o  male who presented for Robot Assisted Radical Prostatectomy by Dr Amy Ortega  Procedure(s):  PROSTATECTOMY RADICAL W ROBOTICS; BILATERAL PELVIC LYMPH NODE DISSECTION LYSIS OF ADHESIONS Robotic; DOUBLE LEFT URETERAL STENT INSERTION with retrograde pyelogram, Instillation of ICG; left upper pole ureteral re-implantation Robotic  Surgeon(s):  VASU Alcala MD Chaya Damme, MD Celester Pica, MD  9/4/2019    Hospital Course:  Intraoperatively, 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  Second left ureter was reimplanted  Both of his left ureters were stented with stents remaining at the completion of the procedure  He recovered from anesthesia was transferred to the medical-surgical floor without event  Postoperatively he did experience acute kidney injury and some nausea and vomiting  His creatinine returned to baseline on postoperative day 2  His Albarado catheter was maintained to gravity drainage with a GILBERT drain in his abdomen  GILBERT read drain was removed on postoperative day 2  He did experience some nausea and gas pain, which improved with simethicone, Zofran, Reglan and Vicodin  He was passing flatus and tolerating p o  Intake  On postoperative day 2 he met criteria for discharge  He was discharged home to continue his recovery there  Discharge Diagnosis: Prostate cancer ([facility] [address] )    Condition at Discharge: good     Discharge Medications:  See after visit summary for reconciled discharge medications provided to patient and family  Patient was discharged home on home medications with the addition of Vicodin p r n  For pain, Colace for stool softener, oxybutynin p r n  For bladder spasm, simethicone for gas pain and Zofran for nausea       Discharge instructions/Information to patient and family:   See after visit summary for information provided to patient and family  Provisions for Follow-Up Care:  See after visit summary for information related to follow-up care and any pertinent home health orders  Disposition: Home    Planned Readmission: No    Discharge Statement   I spent 20 minutes discharging the patient  This time was spent on the day of discharge  I had direct contact with the patient on the day of discharge  Additional documentation is required if more than 30 minutes were spent on discharge       Signature:   Zac Rowe PA-C  Date: 9/6/2019 Time: 2:37 PM

## 2019-09-06 NOTE — QUICK NOTE
Treatment Plan - Urology   Fani Cardoso 66 y o  male MRN: [id number]  Unit/Bed#: [place] [address] 202-01 Encounter: [id number]    Patient re-evaluated  Continuing to have some abdominal pains, worse on the left side, with gas and abdominal bloating  Did have several small events of passage of gas overnight, without relief of his abdominal symptoms  Some nausea, no vomiting  On exam, he is comfortable, low draining clear pink urine  Abdomen with tenderness surrounding his incisions  LLQ GILBERT drain with serosang drainage - removed and dressing applied  He tolerated this well  Normal active bowel sounds to auscultation  Will continue simethicone and p o  Hydrocodone  Will re-evaluate patient this afternoon for possible discharge home today versus tomorrow  Rx's sent to pharmacy - Vicodin, Colace, Oxybutynin, and Simethicone in anticipation of upcoming discharge       Melvin Ruelas PA-C  Date: 9/6/2019 Time: 12:04 PM

## 2019-09-06 NOTE — NURSING NOTE
Patient discharged 9/6/2019 3:13 PM  AVS and prescription reviewed with patient and spouse  All questions answered  Patient to be accompanied to exit in wheelchair to be transported home by spouse in personal vehicle      Katherine Kehr, RN 9/6/2019 3:15 PM

## 2019-09-08 LAB
ABO GROUP BLD BPU: NORMAL
ABO GROUP BLD BPU: NORMAL
BPU ID: NORMAL
BPU ID: NORMAL
CROSSMATCH: NORMAL
CROSSMATCH: NORMAL
UNIT DISPENSE STATUS: NORMAL
UNIT DISPENSE STATUS: NORMAL
UNIT PRODUCT CODE: NORMAL
UNIT PRODUCT CODE: NORMAL
UNIT RH: NORMAL
UNIT RH: NORMAL

## 2019-09-09 ENCOUNTER — TELEPHONE (OUTPATIENT)
Dept: UROLOGY | Facility: AMBULATORY SURGERY CENTER | Age: 64
End: 2019-09-09

## 2019-09-09 NOTE — TELEPHONE ENCOUNTER
Hydrocodone-Acetamin 5-325mg is approved with the insurance good from 9/6/19-9/6/2020, I will inform the patient

## 2019-09-09 NOTE — TELEPHONE ENCOUNTER
Carlos Gilbert is S/P RALP with BPLND and double left ureteral stent insertion on 9/4/19 with Dr Flores Brothers  Called patient  His urine is pink  Instructed him to increase P/O fluids with water  He is having some incisional pain #4  He switched to tylenol today  He is having BMs  He denies fever  He was encouraged to walk frequently  Kegel exercises reviewed  P/O restrictions reviewed including no lifting greater than 10 lbs for 6 weeks after surgery and no driving until Albarado is removed  He is aware of the date, time, and  location of his P/O appointment  All of his questions were answered and he was instructed to call the Urology office with any problems

## 2019-09-10 ENCOUNTER — OFFICE VISIT (OUTPATIENT)
Dept: UROLOGY | Facility: CLINIC | Age: 64
End: 2019-09-10

## 2019-09-10 ENCOUNTER — TRANSITIONAL CARE MANAGEMENT (OUTPATIENT)
Dept: INTERNAL MEDICINE CLINIC | Facility: CLINIC | Age: 64
End: 2019-09-10

## 2019-09-10 VITALS
BODY MASS INDEX: 27.46 KG/M2 | HEART RATE: 68 BPM | HEIGHT: 70 IN | DIASTOLIC BLOOD PRESSURE: 70 MMHG | SYSTOLIC BLOOD PRESSURE: 130 MMHG | WEIGHT: 191.8 LBS

## 2019-09-10 DIAGNOSIS — Q62.5 DUPLICATED LEFT RENAL COLLECTING SYSTEM: ICD-10-CM

## 2019-09-10 DIAGNOSIS — C61 PROSTATE CANCER (HCC): Primary | ICD-10-CM

## 2019-09-10 DIAGNOSIS — Q62.63 ECTOPIC INSERTION OF URETER: ICD-10-CM

## 2019-09-10 PROCEDURE — 99024 POSTOP FOLLOW-UP VISIT: CPT | Performed by: UROLOGY

## 2019-09-10 RX ORDER — SULFAMETHOXAZOLE AND TRIMETHOPRIM 800; 160 MG/1; MG/1
1 TABLET ORAL EVERY 12 HOURS SCHEDULED
Qty: 2 TABLET | Refills: 0 | Status: SHIPPED | OUTPATIENT
Start: 2019-09-10 | End: 2019-09-11

## 2019-09-10 RX ORDER — ALPRAZOLAM 1 MG/1
1 TABLET ORAL ONCE
Qty: 1 TABLET | Refills: 0 | Status: SHIPPED | OUTPATIENT
Start: 2019-09-10 | End: 2019-09-10

## 2019-09-10 NOTE — PROGRESS NOTES
UROLOGY FOLLOWUP NOTE     CHIEF COMPLAINT   Lisa Pena is a 60 y o  male with a complaint of   Chief Complaint   Patient presents with    Prostate Cancer    Post-op       History of Present Illness:     60 y o  male who recently underwent PSA testing an outside urologist PSA was elevated at 10  Repeat PSA karen to 13  Patient denies any significant urinary symptoms of infection or inflammation  He denies a family history of prostate cancer although he thinks his father may have had some type of prostate procedure  No prostate cancer in his brothers  Presents today for urgent evaluation  Of note, his daughter is a [facility] recovery room nurse and his wife is also an RN  Lab Results   Component Value Date    PSA 13 4 (H) 04/26/2019    PSA 10 1 (H) 01/09/2019     AUA SS 7, LYLE 19  Uses Viagra 100 mg    We reschedule the procedure with pre-administration of IM gentamycin  Patient does have intermediate risk prostate cancer  He underwent a CT of the pelvis and returns for results  Patient ultimately opted move forward with surgery  patient underwent his robotic assisted laparoscopic prostatectomy with bilateral pelvic lymph node dissection  Patient did undergo nerve-sparing with a thermal technique on the bilateral pedicles  During the course of his surgery, an abnormal structure was identified emanating off the tip of the left seminal vesicle  Ultimately, 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  This required robotic reimplantation of the upper pole moiety into the bladder  The patient has  Stents in both the upper and lower pole moiety of his left kidney      Past Medical History:     Past Medical History:   Diagnosis Date    Arthritis     mostly hands    Bladder stone     Blue baby     at birth /with heart murmur/no longer noted    Carpal tunnel syndrome     Chronic pain disorder     "hands"    Generalized joint pain     GERD (gastroesophageal reflux disease)     "silent"    Herniated disc, cervical     some neck pain    History of kidney stones     [place] (hard of hearing)     "slightly"    Hyperlipidemia     Moderate exercise     "very active lifestyle/walking/at least 4 miles per day/active job"    PONV (postoperative nausea and vomiting)     "almost every time after surgery"    Prostate cancer ([facility] [address]     Rosacea     Skin cancer     Tinnitus        PAST SURGICAL HISTORY:     Past Surgical History:   Procedure Laterality Date    BACK SURGERY      HERNIATED DISC/Lumbar    CARPAL TUNNEL RELEASE Bilateral     COLONOSCOPY      HERNIA REPAIR Right     inguinal    NEUROPLASTY / TRANSPOSITION MEDIAN NERVE AT CARPAL TUNNEL BILATERAL      AL LAP,PROSTATECTOMY,RADICAL,W/NERVE SPARE,INCL ROBOTIC N/A 9/4/2019    Procedure: PROSTATECTOMY RADICAL W ROBOTICS; BILATERAL PELVIC LYMPH NODE DISSECTION LYSIS OF ADHESIONS Robotic; DOUBLE LEFT URETERAL STENT INSERTION with retrograde pyelogram, Instillation of ICG; left upper pole ureteral re-implantation Robotic;  Surgeon: Sobia Glover MD;  Location: [facility];  Service: Urology    PROSTATE BIOPSY      SKIN LESION EXCISION      x2 on nose for melanoma    TONSILLECTOMY         CURRENT MEDICATIONS:     Current Outpatient Medications   Medication Sig Dispense Refill    docusate sodium (COLACE) 100 mg capsule Take 1 capsule (100 mg total) by mouth 2 (two) times a day 60 capsule 0    HYDROcodone-acetaminophen (NORCO) 5-325 mg per tablet Take 2 tablets by mouth every 4 (four) hours as needed for pain for up to 30 daysMax Daily Amount: 12 tablets 30 tablet 0    ondansetron (ZOFRAN-ODT) 4 mg disintegrating tablet Take 1 tablet (4 mg total) by mouth every 4 (four) hours as needed for nausea or vomiting for up to 7 days 15 tablet 0    oxybutynin (DITROPAN-XL) 5 mg 24 hr tablet Take 1 tablet (5 mg total) by mouth daily 14 tablet 0    simethicone (MYLICON) 80 mg chewable tablet Chew 1 tablet (80 mg total) 4 (four) times a day as needed for flatulence 30 tablet 0    TURMERIC PO Take by mouth daily      ALPRAZolam (XANAX) 1 mg tablet Take 1 tablet (1 mg total) by mouth once for 1 dose Take one hour prior to cystoscopy, need  1 tablet 0    sildenafil (REVATIO) 20 mg tablet Take 1 tablet (20 mg total) by mouth once for 1 dose On empty stomach, one hour before intercourse 90 tablet 1    sulfamethoxazole-trimethoprim (BACTRIM DS) 800-160 mg per tablet Take 1 tablet by mouth every 12 (twelve) hours for 2 doses Morning prior to cystoscopy 2 tablet 0     No current facility-administered medications for this visit  ALLERGIES:     Allergies   Allergen Reactions    Ciprofloxacin GI Intolerance    Keflex [Cephalexin] GI Intolerance       SOCIAL HISTORY:     Social History     Socioeconomic History    Marital status: /Civil Union     Spouse name: None    Number of children: None    Years of education: None    Highest education level: None   Occupational History    Occupation: EMPLOYED   Social Needs    Financial resource strain: None    Food insecurity:     Worry: None     Inability: None    Transportation needs:     Medical: None     Non-medical: None   Tobacco Use    Smoking status: Never Smoker    Smokeless tobacco: Never Used   Substance and Sexual Activity    Alcohol use:  Yes     Alcohol/week: 1 0 standard drinks     Types: 1 Glasses of wine per week     Frequency: 2-4 times a month    Drug use: No    Sexual activity: None   Lifestyle    Physical activity:     Days per week: None     Minutes per session: None    Stress: None   Relationships    Social connections:     Talks on phone: None     Gets together: None     Attends Alevism service: None     Active member of club or organization: None     Attends meetings of clubs or organizations: None     Relationship status: None    Intimate partner violence:     Fear of current or ex partner: None     Emotionally abused: None     Physically abused: None     Forced sexual activity: None   Other Topics Concern    None   Social History Narrative        EMPLOYED       SOCIAL HISTORY:     Family History   Problem Relation Age of Onset    Hypertension Mother     Stroke Father        REVIEW OF SYSTEMS:     Review of Systems   Constitutional: Negative  Respiratory: Negative  Cardiovascular: Negative  Gastrointestinal: Negative  Genitourinary: Negative  Musculoskeletal: Negative  Skin: Negative  Psychiatric/Behavioral: Negative  PHYSICAL EXAM:     /70   Pulse 68   Ht 5' 10" (1 778 m)   Wt 87 kg (191 lb 12 8 oz)   BMI 27 52 kg/m²     General:  Healthy appearing male in no acute distress  They have a normal affect  There is not appear to be any gross neurologic defects or abnormalities  HEENT:  Normocephalic, atraumatic  Neck is supple without any palpable lymphadenopathy  Cardiovascular:  Patient has normal palpable distal radial pulses  There is no significant peripheral edema  No JVD is noted  Respiratory:  Patient has unlabored respirations  There is no audible wheeze or rhonchi  Abdomen:  Abdomen is with healing surgical scars  Abdomen is soft and nontender  There is no tympany  Inguinal and umbilical hernia are not appreciated  : Albarado in place  Musculoskeletal:  Patient does not have significant CVA tenderness in the  flank with palpation or percussion  They full range of motion in all 4 extremities  Strength in all 4 extremities appears congruent  Patient is able to ambulate without assistance or difficulty  Dermatologic:  Patient has no skin abnormalities or rashes        LABS:     CBC:   Lab Results   Component Value Date    WBC 11 02 (H) 09/06/2019    HGB 11 5 (L) 09/06/2019    HCT 35 4 (L) 09/06/2019    MCV 94 09/06/2019     09/06/2019       BMP:   Lab Results   Component Value Date    CALCIUM 8 5 09/06/2019    K 4 3 2019    CO2 28 2019     2019    BUN 15 2019    CREATININE 1 15 2019     Lab Results   Component Value Date    PSA 13 4 (H) 2019    PSA 10 1 (H) 2019     IMAGIN/25/19  CT PELVIS WITH IV CONTRAST     INDICATION:   C61: Malignant neoplasm of prostate      COMPARISON:  None      TECHNIQUE: CT examination of the pelvis was performed  Axial, sagittal, and coronal 2D reformatted images were created from the source data and submitted for interpretation      Radiation dose length product (DLP) for this visit:  434 2 mGy-cm   This examination, like all CT scans performed in the [facility], was performed utilizing techniques to minimize radiation dose exposure, including the use of iterative   reconstruction and automated exposure control      IV Contrast:  100 mL of iohexol (OMNIPAQUE)  Enteric Contrast:  Enteric contrast was administered       FINDINGS:     REPRODUCTIVE ORGANS:  Prostamegaly protruding into the bladder base      URINARY BLADDER:  4 mm right bladder calculus     APPENDIX:  No findings to suggest appendicitis      VISUALIZED BOWEL:  Unremarkable      ABDOMINOPELVIC CAVITY:  No ascites or free intraperitoneal air  No lymphadenopathy  VISUALIZED VESSELS:  Unremarkable for patient's age  ABDOMINOPELVIC WALL/INGUINAL REGIONS: Fat-containing left inguinal hernia     OSSEOUS STRUCTURES:  No acute fracture or destructive osseous lesion      IMPRESSION:     No signs of metastatic disease to the pelvis      Prostamegaly      4 mm right bladder calculus  PATHOLOGY:     19  Addendum 2   C  CK AE1/3 is negative in a lymph node  The original diagnosis is UNCHANGED  Addendum electronically signed by Octavio Lowe MD on 2019 at  2:20 PM   Addendum   Intradepartmental consultation concurs with the diagnosis of prostatic adenocarcinoma  Addendum electronically signed by Octavio Lowe MD on 2019 at 10:30 AM   Final Diagnosis   A  Dorsal vein margin (excision):  - Prostatic adenocarcinoma extending to tissue edges     B  Periprostatic fat and lymph nodes (excision):  - Fibroadipose tissue  - No carcinoma identified      C  Left pelvic lymph node (excision):  - One (1) lymph node negative for carcinoma (0/1)     D  Right pelvic lymph node (excision):  - One (1) lymph node negative for carcinoma (0/1)     E  Prostate (radical prostatectomy):  - Prostatic adenocarcinoma, Benkelman score 3+4=7   - Calcified nodules   - See staging protocol below (pT3aN0)   Electronically signed by Olga Plummer MD on 9/8/2019 at  5:10 PM   Additional Information    All controls performed with the immunohistochemical stains reported above reacted appropriately  These tests were developed and their performance characteristics determined by [facility] or [facility]  They may not be cleared or approved by the [place]  Food and Drug Administration  The FDA has determined that such clearance or approval is not necessary  These tests are used for clinical purposes  They should not be regarded as investigational or for research  This laboratory has been approved by [place] [id number], designated as a high-complexity laboratory and is qualified to perform these tests     Synoptic Checklist   PROSTATE GLAND: Radical Prostatectomy   Prostate Res - All Specimens   SPECIMEN   Procedure  Radical prostatectomy    Prostate Size     Prostate Weight (g)  46 g   Prostate Greatest Dimension in Centimeters (cm)  4 9 Centimeters (cm)   Additional Dimension in Centimeters (cm)  4 4 Centimeters (cm)     3 9 Centimeters (cm)   TUMOR   Histologic Type  Acinar adenocarcinoma    Histologic Grade     Benkelman Pattern     Percentage of Pattern 4  20 %   Primary Benkelman Pattern  Pattern 3    Secondary Larry Pattern  Pattern 4    Tertiary Benkelman Pattern  Not applicable    Grade Group  2    Intraductal Carcinoma (IDC)  Not identified    Tumor Extent     Tumor Quantitation Estimated percentage of prostate involved by tumor: 40 %   Extraprostatic Extension (EPE)  Present, nonfocal    Location of Extraprostatic Extension  dorsal vein margin     Seminal Vesicle Invasion  Not identified    Accessory Findings     Treatment Effect  No known presurgical therapy    Lymphovascular Invasion  Not Identified    Perineural Invasion  Present    MARGINS   Margins  Involved by invasive carcinoma      Non-limited (>= 3 mm)    Linear Length of Positive Margin(s) in Millimeters (mm)  15 Millimeters (mm)   Focality  Multifocal    Location of Positive Margin(s)  Right apical      Right anterior      Left apical      Left anterior    Margin Positivity in Area of Extraprostatic Extension (EPE)  Present    Location(s)  doral vein margin     Larry Pattern at Positive Margin(s)  Pattern 4 or 5    LYMPH NODES   Number of Lymph Nodes Involved  0    Number of Lymph Nodes Examined  2    PATHOLOGIC STAGE CLASSIFICATION (pTNM, AJCC 8th Edition)   TNM Descriptors  Not applicable    Primary Tumor (pT)  pT3a    Regional Lymph Nodes (pN)  pN0    Distant Metastasis (pM)  Not applicable - pM cannot be determined from the submitted specimen(s)    ADDITIONAL FINDINGS   Additional Pathologic Findings  High-grade prostatic intraepithelial neoplasia (PIN)      PRETREATMENT NOMOGRAM:         ASSESSMENT:     60 y o  male with newly diagnosed [id number] Larry 3+4=7 adenocarcinoma of the prostate, with extraprostatic extension and positive margins at the apical/dorsal vein, s/p additional resected tissue and 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 into seminal vesical requiring LEFT upper pole moeity reimplant    PLAN:       Patient has T3a disease with extracapsular extension  This was somewhat predicted by the preoperative nomogram   I discussed this finding with the patient and his wife    Fortunately we did resect additional tissue in this area but we will have to wait and see what his 2 month all PSA demonstrates in terms of persistent disease  The difference between adjuvant or planned radiation therapy or salvage radiation therapy was briefly discussed  Again PSA testing will be undertaken at 2 months   With ultrasensitive study  With regard to the patient's duplication anomaly, I have recommended a CT cystogram 2 weeks after surgery to ensure the bladder is healed prior to removal of his Albarado catheter  His  Wife is a nurse and if the cystogram is normal, we can instruct her to remove his catheter at home  She is comfortable with this plan  In addition to the CT cystogram that I ordered, I would also like some axial imaging of his kidney and so have also ordered a noncontrast CT scan of the abdomen and pelvis  These 2 studies can be done in conjunction to both evaluate the duplication anomaly and to ensure that his bladder is well healed  I will remove his stents with cystoscopy 6 weeks after surgery  The patient has requested a gentle sedative so I have prescribed Xanax  To be taken 1 hour before the procedure  I have also recommended antibiotics to be taken the morning of  Patient will need a  2 and from this procedure  Patient is otherwise doing very well as he recovers from surgery  He is ambulatory and tolerating a regular diet with good bowel function  His pain is well controlled

## 2019-09-10 NOTE — TELEPHONE ENCOUNTER
SPOKE TO PT TODAY-HE IS JUST GOING TO FOLLOW UP W THE UROLOGIST  HE DID NOT WANT TO MAKE A [facility] APPT   Xenia Hein

## 2019-09-18 ENCOUNTER — TELEPHONE (OUTPATIENT)
Dept: UROLOGY | Facility: CLINIC | Age: 64
End: 2019-09-18

## 2019-09-18 ENCOUNTER — HOSPITAL ENCOUNTER (OUTPATIENT)
Dept: CT IMAGING | Facility: HOSPITAL | Age: 64
Discharge: HOME | End: 2019-09-18
Attending: UROLOGY
Payer: COMMERCIAL

## 2019-09-18 DIAGNOSIS — C61 PROSTATE CANCER (HCC): ICD-10-CM

## 2019-09-18 DIAGNOSIS — Q62.5 DUPLICATED LEFT RENAL COLLECTING SYSTEM: ICD-10-CM

## 2019-09-18 PROCEDURE — 74176 CT ABD & PELVIS W/O CONTRAST: CPT

## 2019-09-18 PROCEDURE — 72194 CT PELVIS W/O & W/DYE: CPT

## 2019-09-18 RX ADMIN — IOHEXOL 15 ML: 350 INJECTION, SOLUTION INTRAVENOUS at 10:02

## 2019-09-18 NOTE — TELEPHONE ENCOUNTER
Called [phone] and left message patient's wife who is a RN may remove patient's low this evening   Cystogram is normal

## 2019-09-18 NOTE — TELEPHONE ENCOUNTER
----- Message from Vincent Spaulding MD sent at 9/18/2019  2:35 PM EDT -----  Can we please let patient's wife know that she can remove low as discussed   Cystogram negative for leak

## 2019-09-23 ENCOUNTER — TELEPHONE (OUTPATIENT)
Dept: UROLOGY | Facility: CLINIC | Age: 64
End: 2019-09-23

## 2019-09-23 NOTE — TELEPHONE ENCOUNTER
Patient contacted office again  Discussed with patient Janette's recommendations  Use scrotal support and use ice  Patient to contact office in 1-2 days with progress report  If discomfort persists will need US and urine culture  Discussed the lightheadedness with patient  Explained the reason could be change of position  Patient does take his time getting in and out of bed and dizziness persist   Patient's wife is a RN and she will begin monitoring patient's blood pressure morning and night  Await patient's progress report

## 2019-09-23 NOTE — TELEPHONE ENCOUNTER
For the scrotal discomfort recommend support and ice  If swelling and redness or pain worsens will need urine culture and US to evaluate  Could be epididymal inflammation related to having the Albarado  For the lightheadedness if it's only AM and PM with bedtime and position changes it could just be postural  He should take his time (sometimes even 5 minutes!) to get into and out of bed  Hydrating well during the day especially when he is going to be doing anything active  Might be a good idea to monitor BPs at home twice a day not just if/when he is feeling these symptoms  If he notes the lightheadedness is persisting throughout the day or affecting his vision or ability to talk/walk will recommend ER and PCP eval as well

## 2019-09-23 NOTE — TELEPHONE ENCOUNTER
Patient managed by Dr Nina Boucher, been seen at [facility] and the [facility] office  Patient s/p prostatectomy radical with robotics, bilateral   Lymph node dissection, lysis of adhesions robotic, double left ureteral stent insertion, left upper pole ureteral re-implantation robotic 09/04/2019  Patient's low removed 09/18/2019  Patient called office this morning stating Friday 09/20/2019 he started experiencing intermittent left scrotal pain  Patient reports over the weekend the pain became constant  Denies any fever/chills, scrotal swelling or redness  Denies any voiding problems other than the frequency and urgency due to the ureteral stents  Also patient is experiencing lightheadedness " room is spinning" when he lays down at night and when he gets out of bed in the morning, patient did not have before surgery  Discussed with patient will route to Dr Nina Boucher and then call patient back

## 2019-09-24 ENCOUNTER — HOSPITAL ENCOUNTER (EMERGENCY)
Facility: HOSPITAL | Age: 64
Discharge: HOME | End: 2019-09-24
Attending: EMERGENCY MEDICINE | Admitting: EMERGENCY MEDICINE
Payer: COMMERCIAL

## 2019-09-24 ENCOUNTER — APPOINTMENT (EMERGENCY)
Dept: ULTRASOUND IMAGING | Facility: HOSPITAL | Age: 64
End: 2019-09-24
Payer: COMMERCIAL

## 2019-09-24 VITALS
BODY MASS INDEX: 27.2 KG/M2 | WEIGHT: 190 LBS | HEART RATE: 70 BPM | RESPIRATION RATE: 16 BRPM | SYSTOLIC BLOOD PRESSURE: 129 MMHG | DIASTOLIC BLOOD PRESSURE: 78 MMHG | HEIGHT: 70 IN | OXYGEN SATURATION: 98 % | TEMPERATURE: 97.6 F

## 2019-09-24 DIAGNOSIS — N50.812 TESTICULAR PAIN, LEFT: ICD-10-CM

## 2019-09-24 DIAGNOSIS — N39.0 UTI (URINARY TRACT INFECTION): Primary | ICD-10-CM

## 2019-09-24 LAB
BACTERIA UR QL AUTO: ABNORMAL /HPF
BILIRUB UR QL STRIP: NEGATIVE
CLARITY UR: ABNORMAL
COLOR UR: ABNORMAL
GLUCOSE UR STRIP-MCNC: NEGATIVE MG/DL
HGB UR QL STRIP.AUTO: ABNORMAL
KETONES UR STRIP-MCNC: NEGATIVE MG/DL
LEUKOCYTE ESTERASE UR QL STRIP: ABNORMAL
NITRITE UR QL STRIP: POSITIVE
NON-SQ EPI CELLS URNS QL MICRO: ABNORMAL /HPF
PH UR STRIP.AUTO: 5.5 [PH]
PROT UR STRIP-MCNC: ABNORMAL MG/DL
RBC #/AREA URNS AUTO: ABNORMAL /HPF
SP GR UR STRIP.AUTO: 1.02 (ref 1–1.03)
UROBILINOGEN UR QL STRIP.AUTO: 0.2 E.U./DL
WBC #/AREA URNS AUTO: ABNORMAL /HPF

## 2019-09-24 PROCEDURE — 87186 SC STD MICRODIL/AGAR DIL: CPT | Performed by: EMERGENCY MEDICINE

## 2019-09-24 PROCEDURE — 81001 URINALYSIS AUTO W/SCOPE: CPT | Performed by: EMERGENCY MEDICINE

## 2019-09-24 PROCEDURE — 99284 EMERGENCY DEPT VISIT MOD MDM: CPT

## 2019-09-24 PROCEDURE — 87086 URINE CULTURE/COLONY COUNT: CPT | Performed by: EMERGENCY MEDICINE

## 2019-09-24 PROCEDURE — 76870 US EXAM SCROTUM: CPT

## 2019-09-24 PROCEDURE — 87147 CULTURE TYPE IMMUNOLOGIC: CPT | Performed by: EMERGENCY MEDICINE

## 2019-09-24 RX ORDER — SULFAMETHOXAZOLE AND TRIMETHOPRIM 800; 160 MG/1; MG/1
1 TABLET ORAL 2 TIMES DAILY
Qty: 14 TABLET | Refills: 0 | Status: SHIPPED | OUTPATIENT
Start: 2019-09-24 | End: 2019-10-01

## 2019-09-24 RX ORDER — SULFAMETHOXAZOLE AND TRIMETHOPRIM 800; 160 MG/1; MG/1
1 TABLET ORAL ONCE
Status: COMPLETED | OUTPATIENT
Start: 2019-09-24 | End: 2019-09-24

## 2019-09-24 RX ADMIN — SULFAMETHOXAZOLE AND TRIMETHOPRIM 1 TABLET: 800; 160 TABLET ORAL at 10:10

## 2019-09-24 NOTE — ED PROVIDER NOTES
History  Chief Complaint   Patient presents with    Testicle Pain     radiating down leg and to back     From home w/spouse, has left scrotal pain for about 2 days, progressive, w/radiation around towards left lower back  No direct scrotal trauma since procedure  No fever/chills  Admits to urinary frequency w/o sensation of urinary retention  Is s/p radical prostatectomy 9/4/2019 (Dr Adolfo Kirk) w/prostate cancer, also has duplicated left renal collecting system  9/4/2019: PROSTATECTOMY RADICAL W ROBOTICS; BILATERAL PELVIC LYMPH NODE DISSECTION LYSIS OF ADHESIONS Robotic; DOUBLE LEFT URETERAL STENT INSERTION with retrograde pyelogram, Instillation of ICG; left upper pole ureteral re-implantation Robotic (N/A) (Dr Adolfo Kirk)    9/18/2019: CT cystogram demonstrates no evidence of contrast leakage in this patient with duplicated left renal collecting system, status post reimplanted upper pole moiety left ureter, with 2 ureteral stents and Albarado catheter still in place  Prior to Admission Medications   Prescriptions Last Dose Informant Patient Reported? Taking?    ALPRAZolam (XANAX) 1 mg tablet   No No   Sig: Take 1 tablet (1 mg total) by mouth once for 1 dose Take one hour prior to cystoscopy, need    HYDROcodone-acetaminophen (NORCO) 5-325 mg per tablet   No No   Sig: Take 2 tablets by mouth every 4 (four) hours as needed for pain for up to 30 daysMax Daily Amount: 12 tablets   TURMERIC PO  Self Yes No   Sig: Take by mouth daily   docusate sodium (COLACE) 100 mg capsule   No No   Sig: Take 1 capsule (100 mg total) by mouth 2 (two) times a day   ondansetron (ZOFRAN-ODT) 4 mg disintegrating tablet   No No   Sig: Take 1 tablet (4 mg total) by mouth every 4 (four) hours as needed for nausea or vomiting for up to 7 days   oxybutynin (DITROPAN-XL) 5 mg 24 hr tablet   No No   Sig: Take 1 tablet (5 mg total) by mouth daily   sildenafil (REVATIO) 20 mg tablet   No No   Sig: Take 1 tablet (20 mg total) by mouth once for 1 dose On empty stomach, one hour before intercourse   simethicone (MYLICON) 80 mg chewable tablet   No No   Sig: Chew 1 tablet (80 mg total) 4 (four) times a day as needed for flatulence      Facility-Administered Medications: None       Past Medical History:   Diagnosis Date    Arthritis     mostly hands    Bladder stone     Blue baby     at birth /with heart murmur/no longer noted    Carpal tunnel syndrome     Chronic pain disorder     "hands"    Generalized joint pain     GERD (gastroesophageal reflux disease)     "silent"    Herniated disc, cervical     some neck pain    History of kidney stones     [place] (hard of hearing)     "slightly"    Hyperlipidemia     Moderate exercise     "very active lifestyle/walking/at least 4 miles per day/active job"    PONV (postoperative nausea and vomiting)     "almost every time after surgery"    Prostate cancer ([address] )     Rosacea     Skin cancer     Tinnitus        Past Surgical History:   Procedure Laterality Date    BACK SURGERY      HERNIATED DISC/Lumbar    CARPAL TUNNEL RELEASE Bilateral     COLONOSCOPY      CT CYSTOGRAM  9/18/2019    FL RETROGRADE PYELOGRAM  9/4/2019    HERNIA REPAIR Right     inguinal    NEUROPLASTY / TRANSPOSITION MEDIAN NERVE AT CARPAL TUNNEL BILATERAL      FL LAP,PROSTATECTOMY,RADICAL,W/NERVE SPARE,INCL ROBOTIC N/A 9/4/2019    Procedure: PROSTATECTOMY RADICAL W ROBOTICS; BILATERAL PELVIC LYMPH NODE DISSECTION LYSIS OF ADHESIONS Robotic; DOUBLE LEFT URETERAL STENT INSERTION with retrograde pyelogram, Instillation of ICG; left upper pole ureteral re-implantation Robotic;  Surgeon: Angel Brink MD;  Location: [facility];  Service: Urology    PROSTATE BIOPSY      PROSTATECTOMY      SKIN LESION EXCISION      x2 on nose for melanoma    TONSILLECTOMY         Family History   Problem Relation Age of Onset    Hypertension Mother     Stroke Father      I have reviewed and agree with the history as documented  Social History     Tobacco Use    Smoking status: Never Smoker    Smokeless tobacco: Never Used   Substance Use Topics    Alcohol use: Yes     Alcohol/week: 1 0 standard drinks     Types: 1 Glasses of wine per week     Frequency: 2-4 times a month    Drug use: No        Review of Systems   Constitutional: Negative for chills and fever  HENT: Negative for rhinorrhea and sore throat  Eyes: Negative for visual disturbance  Respiratory: Negative for cough and shortness of breath  Cardiovascular: Negative for chest pain and leg swelling  Gastrointestinal: Negative for diarrhea and vomiting  Genitourinary: Positive for frequency and testicular pain  Musculoskeletal: Negative for back pain and myalgias  Skin: Negative for rash  Neurological: Negative for dizziness and headaches  Psychiatric/Behavioral: Negative for confusion  All other systems reviewed and are negative  Physical Exam  Physical Exam   Constitutional: He is oriented to person, place, and time  He appears well-developed and well-nourished  HENT:   Nose: Nose normal    Mouth/Throat: Oropharynx is clear and moist  No oropharyngeal exudate  Eyes: Pupils are equal, round, and reactive to light  Conjunctivae and EOM are normal  No scleral icterus  Neck: Normal range of motion  Neck supple  No JVD present  No tracheal deviation present  Cardiovascular: Normal rate, regular rhythm and normal heart sounds  No murmur heard  Pulmonary/Chest: Effort normal and breath sounds normal  No respiratory distress  He has no wheezes  He has no rales  Abdominal: Soft  There is tenderness in the left lower quadrant  There is no guarding  Genitourinary: Right testis shows no swelling and no tenderness  Left testis shows tenderness  Left testis shows no swelling  Circumcised  No penile erythema or penile tenderness  No discharge found  Musculoskeletal: Normal range of motion  He exhibits no edema or tenderness  Neurological: He is alert and oriented to person, place, and time  No cranial nerve deficit or sensory deficit  He exhibits normal muscle tone  5/5 motor, nl sens   Skin: Skin is warm and dry  Psychiatric: He has a normal mood and affect  His behavior is normal    Nursing note and vitals reviewed  Vital Signs  ED Triage Vitals [09/24/19 0812]   Temperature Pulse Respirations Blood Pressure SpO2   97 6 °F (36 4 °C) 87 16 143/83 98 %      Temp Source Heart Rate Source Patient Position - Orthostatic VS BP Location FiO2 (%)   Temporal Monitor Sitting Left arm --      Pain Score       9           Vitals:    09/24/19 0812 09/24/19 1000   BP: 143/83 129/78   Pulse: 87 70   Patient Position - Orthostatic VS: Sitting Sitting         Visual Acuity      ED Medications  Medications   sulfamethoxazole-trimethoprim (BACTRIM DS) 800-160 mg per tablet 1 tablet (1 tablet Oral Given 9/24/19 1010)       Diagnostic Studies  Results Reviewed     Procedure Component Value Units Date/Time    Urine Microscopic [[id number]]  (Abnormal) Collected:  09/24/19 0839    Lab Status:  Final result Specimen:  Urine, Clean Catch Updated:  09/24/19 0911     RBC, UA Innumerable /hpf      WBC, UA 30-50 /hpf      Epithelial Cells Occasional /hpf      Bacteria, UA Moderate /hpf     Urine culture [[id number]] Collected:  09/24/19 0839    Lab Status:   In process Specimen:  Urine, Clean Catch Updated:  09/24/19 0910    UA w Reflex to Microscopic w Reflex to Culture [[id number]]  (Abnormal) Collected:  09/24/19 0839    Lab Status:  Final result Specimen:  Urine, Clean Catch Updated:  09/24/19 0847     Color, UA Dipika     Clarity, UA Cloudy     Specific Gravity, UA 1 025     pH, UA 5 5     Leukocytes, UA 2+     Nitrite, UA Positive     Protein, UA 2+ mg/dl      Glucose, UA Negative mg/dl      Ketones, UA Negative mg/dl      Urobilinogen, UA 0 2 E U /dl      Bilirubin, UA Negative     Blood, UA 3+                 US scrotum and testicles   Final Result by Anna Short MD (09/24 1030)       Bilateral testicular flow  Small left varicocele corresponds to area of indicated pain  Workstation performed: [id number]                    Procedures  Procedures       ED Course  ED Course as of Sep 24 1742   Tue Sep 24, 2019   0826 Initial Impression/MDM: left scrotal pain s/p prostatectomy and ureteral repair, likely epididymitis/orchitis, no fever; will screen w/scrotal US and UA w/urine culture, and consult patient's Urologist           6901 CONSULT: Dr Clare Martinez (patient's Urologist), cased and initial UA findings discussed, agrees w/US and recommends Bactrim, scrotal support, anti-inflammatories and f/u in office; if [address] shows concerning or unusual findings requests call back      [phone] negative; will get Naproxen OTC for anti-inflammatory; urine culture running; Bactrim PO started                                  MDM    Disposition  Final diagnoses:   UTI (urinary tract infection)   Testicular pain, left     Time reflects when diagnosis was documented in both MDM as applicable and the Disposition within this note     Time User Action Codes Description Comment    9/24/2019 10:47 AM Thersia Balint Add [N39 0] UTI (urinary tract infection)     9/24/2019 10:47 AM Thersia Balint Add [J03 083] Testicular pain, left       ED Disposition     ED Disposition Condition Date/Time Comment    Discharge Stable Tue Sep 24, 2019 10:48 AM Flynn Loyola discharge to home/self care              Follow-up Information     Follow up With Specialties Details Why Contact Info    Tameka Marquez MD Urology Call   [facility] [address] [facility]  [phone]            Discharge Medication List as of 9/24/2019 10:49 AM      START taking these medications    Details   sulfamethoxazole-trimethoprim (BACTRIM DS) 800-160 mg per tablet Take 1 tablet by mouth 2 (two) times a day for 7 days smx-tmp DS (BACTRIM) 800-160 mg tabs (1tab q12 D10), Starting Tue 9/24/2019, Until Tue 10/1/2019, Print         CONTINUE these medications which have NOT CHANGED    Details   ALPRAZolam (XANAX) 1 mg tablet Take 1 tablet (1 mg total) by mouth once for 1 dose Take one hour prior to cystoscopy, need , Starting Tue 9/10/2019, Normal      docusate sodium (COLACE) 100 mg capsule Take 1 capsule (100 mg total) by mouth 2 (two) times a day, Starting Wed 9/4/2019, Normal      HYDROcodone-acetaminophen (NORCO) 5-325 mg per tablet Take 2 tablets by mouth every 4 (four) hours as needed for pain for up to 30 daysMax Daily Amount: 12 tablets, Starting Wed 9/4/2019, Until Fri 10/4/2019, Normal      ondansetron (ZOFRAN-ODT) 4 mg disintegrating tablet Take 1 tablet (4 mg total) by mouth every 4 (four) hours as needed for nausea or vomiting for up to 7 days, Starting Fri 9/6/2019, Until Fri 9/13/2019, Normal      oxybutynin (DITROPAN-XL) 5 mg 24 hr tablet Take 1 tablet (5 mg total) by mouth daily, Starting Thu 9/5/2019, Normal      sildenafil (REVATIO) 20 mg tablet Take 1 tablet (20 mg total) by mouth once for 1 dose On empty stomach, one hour before intercourse, Starting Wed 7/17/2019, Normal      simethicone (MYLICON) 80 mg chewable tablet Chew 1 tablet (80 mg total) 4 (four) times a day as needed for flatulence, Starting Fri 9/6/2019, Normal      TURMERIC PO Take by mouth daily, Historical Med           No discharge procedures on file      ED Provider  Electronically Signed by           Mitali Chavarria MD  09/24/19 6644

## 2019-09-24 NOTE — TELEPHONE ENCOUNTER
Contacted and spoke with patient to inform him Dr Mandy Moreno wishes patient to continue the Bactrim  Will call patient with culture and scrotal ultrasound results  Patient is aware if he develops any fever/chills, increase pain or selling needs to contact office or go to ER

## 2019-09-24 NOTE — TELEPHONE ENCOUNTER
Presently patient in the ER at [facility] for testicular pain  Left message on patient;s home number to please contact [facility] office once he returns home

## 2019-09-24 NOTE — TELEPHONE ENCOUNTER
I spoke to the emergency room provider  Patient's urine showed signs of infection  Given his indwelling stents, this is not surprising  I recommended formal culture and empiric Bactrim therapy  Patient would also be going for an emergency room ultrasound of his scrotum  We should obtain both the culture results and ultrasound report prior to a return visit in our office for evaluation

## 2019-09-26 LAB — BACTERIA UR CULT: ABNORMAL

## 2019-09-26 NOTE — TELEPHONE ENCOUNTER
Contacted and spoke with patient to inform him the urine culture does show an urinary tract infection and patient is on the correct antibiotic  The scrotal ultrasound is normal   Patient reports he is feeling better, pain improved  Patient has appointment at the end of October for stent removal  Patient is aware to contact office with any questions

## 2019-10-10 ENCOUNTER — TELEPHONE (OUTPATIENT)
Dept: UROLOGY | Facility: MEDICAL CENTER | Age: 64
End: 2019-10-10

## 2019-10-10 ENCOUNTER — OFFICE VISIT (OUTPATIENT)
Dept: URGENT CARE | Facility: CLINIC | Age: 64
End: 2019-10-10
Payer: COMMERCIAL

## 2019-10-10 VITALS
BODY MASS INDEX: 27.2 KG/M2 | HEIGHT: 70 IN | TEMPERATURE: 97.8 F | OXYGEN SATURATION: 99 % | HEART RATE: 74 BPM | RESPIRATION RATE: 16 BRPM | SYSTOLIC BLOOD PRESSURE: 151 MMHG | WEIGHT: 190 LBS | DIASTOLIC BLOOD PRESSURE: 83 MMHG

## 2019-10-10 DIAGNOSIS — R30.0 DYSURIA: ICD-10-CM

## 2019-10-10 DIAGNOSIS — N39.0 URINARY TRACT INFECTION WITHOUT HEMATURIA, SITE UNSPECIFIED: Primary | ICD-10-CM

## 2019-10-10 LAB
SL AMB  POCT GLUCOSE, UA: ABNORMAL
SL AMB LEUKOCYTE ESTERASE,UA: ABNORMAL
SL AMB POCT BILIRUBIN,UA: ABNORMAL
SL AMB POCT BLOOD,UA: ABNORMAL
SL AMB POCT CLARITY,UA: ABNORMAL
SL AMB POCT COLOR,UA: YELLOW
SL AMB POCT KETONES,UA: ABNORMAL
SL AMB POCT NITRITE,UA: ABNORMAL
SL AMB POCT PH,UA: 6
SL AMB POCT SPECIFIC GRAVITY,UA: 1.02
SL AMB POCT URINE PROTEIN: 300
SL AMB POCT UROBILINOGEN: 0.2

## 2019-10-10 PROCEDURE — 87186 SC STD MICRODIL/AGAR DIL: CPT | Performed by: PHYSICIAN ASSISTANT

## 2019-10-10 PROCEDURE — 87086 URINE CULTURE/COLONY COUNT: CPT | Performed by: PHYSICIAN ASSISTANT

## 2019-10-10 PROCEDURE — 87147 CULTURE TYPE IMMUNOLOGIC: CPT | Performed by: PHYSICIAN ASSISTANT

## 2019-10-10 PROCEDURE — 99213 OFFICE O/P EST LOW 20 MIN: CPT | Performed by: PHYSICIAN ASSISTANT

## 2019-10-10 PROCEDURE — S9088 SERVICES PROVIDED IN URGENT: HCPCS | Performed by: PHYSICIAN ASSISTANT

## 2019-10-10 RX ORDER — LEVOFLOXACIN 500 MG/1
500 TABLET, FILM COATED ORAL EVERY 24 HOURS
Qty: 10 TABLET | Refills: 0 | Status: SHIPPED | OUTPATIENT
Start: 2019-10-10 | End: 2019-10-20

## 2019-10-10 RX ORDER — ALPRAZOLAM 1 MG/1
TABLET ORAL
COMMUNITY
End: 2020-12-21

## 2019-10-10 NOTE — TELEPHONE ENCOUNTER
Reviewed urgent care note ua+ culture sent, discharged with empiric levaquin  Prior culture with staph aureus that would not be sensitive to levaquin, will follow culture closely  Pt does have b/l ureteral stents potential source for both the flank/testicular pain as well as infection  Can take oxybutynin and ibuprofen for stent colic pain as well  For cysto stent removal with ZP 10/29

## 2019-10-10 NOTE — PROGRESS NOTES
[facility]        NAME: Jennifer Shabazz is a 60 y o  male  : 1955    MRN: [id number]  DATE: October 10, 2019  TIME: 10:54 AM    Assessment and Plan   Urinary tract infection without hematuria, site unspecified [N39 0]  1  Urinary tract infection without hematuria, site unspecified  levofloxacin (LEVAQUIN) 500 mg tablet   2  Dysuria  POCT urine dip auto non-scope    Urine culture         Patient Instructions     Start Levaquin as prescribed  The urine culture comes back a bacteria which is not susceptible to Levaquin you receive a phone call  Drink plenty of fluids  Follow up with PCP in 3-5 days  Proceed to  ER if symptoms worsen  Chief Complaint     Chief Complaint   Patient presents with    Possible UTI     x 3 days         History of Present Illness       Patient presents with a 3 day history of burning on urination urinary frequency now today developed left testicle pain  He was seen in the ER 2 weeks ago for similar symptoms  He had a urine culture which revealed greater than 100,000 colonies of Staph aureus  Patient was treated with Bactrim 1 tablet b i d  For 7 days  Symptoms have now work reoccurred  Patient is status post a prostate surgery for cancer and has stents currently place  He is due to have these stents removed at the end of this month  Patient denies any fever chills nausea or vomiting  Review of Systems   Review of Systems   Constitutional: Negative for chills and fever  Gastrointestinal: Positive for abdominal pain (Suprapubic)  Negative for nausea and vomiting  Genitourinary: Positive for flank pain, frequency and testicular pain  Negative for difficulty urinating, hematuria and urgency  Neurological: Negative for light-headedness  Hematological: Negative for adenopathy  Psychiatric/Behavioral: Negative for confusion           Current Medications       Current Outpatient Medications:     ALPRAZolam (XANAX) 1 mg tablet, Take 1 tablet (1 mg total) by mouth once for 1 dose Take one hour prior to cystoscopy, need , Disp: 1 tablet, Rfl: 0    ALPRAZolam (XANAX) 1 mg tablet, Take by mouth daily at bedtime as needed for anxiety, Disp: , Rfl:     docusate sodium (COLACE) 100 mg capsule, Take 1 capsule (100 mg total) by mouth 2 (two) times a day (Patient not taking: Reported on 10/10/2019), Disp: 60 capsule, Rfl: 0    levofloxacin (LEVAQUIN) 500 mg tablet, Take 1 tablet (500 mg total) by mouth every 24 hours for 10 days, Disp: 10 tablet, Rfl: 0    ondansetron (ZOFRAN-ODT) 4 mg disintegrating tablet, Take 1 tablet (4 mg total) by mouth every 4 (four) hours as needed for nausea or vomiting for up to 7 days, Disp: 15 tablet, Rfl: 0    oxybutynin (DITROPAN-XL) 5 mg 24 hr tablet, Take 1 tablet (5 mg total) by mouth daily (Patient not taking: Reported on 10/10/2019), Disp: 14 tablet, Rfl: 0    sildenafil (REVATIO) 20 mg tablet, Take 1 tablet (20 mg total) by mouth once for 1 dose On empty stomach, one hour before intercourse, Disp: 90 tablet, Rfl: 1    simethicone (MYLICON) 80 mg chewable tablet, Chew 1 tablet (80 mg total) 4 (four) times a day as needed for flatulence (Patient not taking: Reported on 10/10/2019), Disp: 30 tablet, Rfl: 0    TURMERIC PO, Take by mouth daily, Disp: , Rfl:     Current Allergies     Allergies as of 10/10/2019 - Reviewed 10/10/2019   Allergen Reaction Noted    Ciprofloxacin GI Intolerance 06/10/2019    Keflex [cephalexin] GI Intolerance 06/10/2019            The following portions of the patient's history were reviewed and updated as appropriate: allergies, current medications, past family history, past medical history, past social history, past surgical history and problem list      Past Medical History:   Diagnosis Date    Arthritis     mostly hands    Bladder stone     Blue baby     at birth /with heart murmur/no longer noted    Carpal tunnel syndrome     Chronic pain disorder     "hands"    Generalized joint pain     GERD (gastroesophageal reflux disease)     "silent"    Herniated disc, cervical     some neck pain    History of kidney stones     [place] (hard of hearing)     "slightly"    Hyperlipidemia     Moderate exercise     "very active lifestyle/walking/at least 4 miles per day/active job"    PONV (postoperative nausea and vomiting)     "almost every time after surgery"    Prostate cancer ([address]     Rosacea     Skin cancer     Tinnitus        Past Surgical History:   Procedure Laterality Date    BACK SURGERY      HERNIATED DISC/Lumbar    CARPAL TUNNEL RELEASE Bilateral     COLONOSCOPY      CT CYSTOGRAM  9/18/2019    FL RETROGRADE PYELOGRAM  9/4/2019    HERNIA REPAIR Right     inguinal    NEUROPLASTY / TRANSPOSITION MEDIAN NERVE AT CARPAL TUNNEL BILATERAL      NH LAP,PROSTATECTOMY,RADICAL,W/NERVE SPARE,INCL ROBOTIC N/A 9/4/2019    Procedure: PROSTATECTOMY RADICAL W ROBOTICS; BILATERAL PELVIC LYMPH NODE DISSECTION LYSIS OF ADHESIONS Robotic; DOUBLE LEFT URETERAL STENT INSERTION with retrograde pyelogram, Instillation of ICG; left upper pole ureteral re-implantation Robotic;  Surgeon: Bebeto Corrigan MD;  Location: [facility];  Service: Urology    PROSTATE BIOPSY      PROSTATECTOMY      SKIN LESION EXCISION      x2 on nose for melanoma    TONSILLECTOMY         Family History   Problem Relation Age of Onset    Hypertension Mother     Stroke Father          Medications have been verified  Objective   /83   Pulse 74   Temp 97 8 °F (36 6 °C)   Resp 16   Ht 5' 10" (1 778 m)   Wt 86 2 kg (190 lb)   SpO2 99%   BMI 27 26 kg/m²        Physical Exam     Physical Exam   Constitutional: He is oriented to person, place, and time  He appears well-developed and well-nourished  HENT:   Head: Normocephalic and atraumatic  Neck: Normal range of motion  Cardiovascular: Normal rate and regular rhythm  Pulmonary/Chest: Effort normal    Abdominal:   No CVA tenderness     Neurological: He is alert and oriented to person, place, and time  Skin: Skin is warm and dry  Psychiatric: He has a normal mood and affect  His behavior is normal    Nursing note and vitals reviewed

## 2019-10-10 NOTE — TELEPHONE ENCOUNTER
Called and spoke to patient he believers he is having another uti      With pain in left Kidney, pain in left side of scrotum, and burning when urinating  I advised patient go to the care now and give a urine sample for micro culture      [place]

## 2019-10-10 NOTE — TELEPHONE ENCOUNTER
Patient of Dr Lauryn Sinclair seen in the [facility] office  Patient advised that he thinks he has a UTI  Patient advised that he is having pain in left Kidney, pain in left side of scrotum, and burning  Patient would like to know what he should do? Please advise

## 2019-10-10 NOTE — PATIENT INSTRUCTIONS
Urinary Tract Infection in Men   AMBULATORY CARE:   A urinary tract infection (UTI)  is caused by bacteria that get inside your urinary tract  Most bacteria that enter your urinary tract come out when you urinate  If the bacteria stay in your urinary tract, you may get an infection  Your urinary tract includes your kidneys, ureters, bladder, and urethra  Urine is made in your kidneys, and it flows from the ureters to the bladder  Urine leaves the bladder through the urethra  A UTI is more common in your lower urinary tract, which includes your bladder and urethra  Common symptoms include the following:   · Urinating more often than usual, leaking urine, or waking from sleep to urinate    · Pain or burning when you urinate    · Pain or pressure in your lower abdomen or back    · Urine that smells bad    · Blood in your urine  Seek care immediately if:   · You are urinating very little or not at all  · You have a high fever with shaking chills  · You have side or back pain that gets worse  Contact your healthcare provider if:   · You have a mild fever  · You do not feel better after 2 days of taking antibiotics  · You are vomiting  · You have new symptoms, such as blood or pus in your urine  · You have questions or concerns about your condition or care  Treatment for a UTI  may include medicines to treat a bacterial infection  You may also need medicines to decrease pain and burning, or decrease the urge to urinate often  Prevent a UTI:   · Empty your bladder often  Urinate and empty your bladder as soon as you feel the need  Do not hold your urine for long periods of time  · Drink liquids as directed  Ask how much liquid to drink each day and which liquids are best for you  You may need to drink more liquids than usual to help flush out the bacteria  Do not drink alcohol, caffeine, or citrus juices  These can irritate your bladder and increase your symptoms   Your healthcare provider may recommend cranberry juice to help prevent a UTI  · Urinate after you have sex  This can help flush out bacteria passed during sex  · Do pelvic muscle exercises often  Pelvic muscle exercises may help you start and stop urinating  Strong pelvic muscles may help you empty your bladder easier  Squeeze these muscles tightly for 5 seconds like you are trying to hold back urine  Then relax for 5 seconds  Gradually work up to squeezing for 10 seconds  Do 3 sets of 15 repetitions a day, or as directed  Follow up with your healthcare provider as directed:  Write down your questions so you remember to ask them during your visits  © 2017 [employer] Information is for End User's use only and may not be sold, redistributed or otherwise used for commercial purposes  All illustrations and images included in CareNotes® are the copyrighted property of A D A M [employer]  or [employer]  The above information is an  only  It is not intended as medical advice for individual conditions or treatments  Talk to your doctor, nurse or pharmacist before following any medical regimen to see if it is safe and effective for you

## 2019-10-10 NOTE — TELEPHONE ENCOUNTER
Patient called to informed us or urgent care ua results   I informed him the AP just confirmed and to please give us a call if he needs anything

## 2019-10-12 LAB — BACTERIA UR CULT: ABNORMAL

## 2019-10-18 ENCOUNTER — APPOINTMENT (OUTPATIENT)
Dept: LAB | Facility: CLINIC | Age: 64
End: 2019-10-18
Payer: COMMERCIAL

## 2019-10-18 DIAGNOSIS — C61 PROSTATE CANCER (HCC): ICD-10-CM

## 2019-10-18 PROCEDURE — 84153 ASSAY OF PSA TOTAL: CPT

## 2019-10-18 PROCEDURE — 36415 COLL VENOUS BLD VENIPUNCTURE: CPT

## 2019-10-19 LAB — PSA SERPL DL<=0.01 NG/ML-MCNC: 0.03 NG/ML (ref 0–4)

## 2019-10-22 ENCOUNTER — TELEPHONE (OUTPATIENT)
Dept: UROLOGY | Facility: MEDICAL CENTER | Age: 64
End: 2019-10-22

## 2019-10-22 NOTE — TELEPHONE ENCOUNTER
Pt managed by Diallo Montanez pt's wife called upset that appointment stent removal is 10/29/19 & the paperwork they have has 10/28/19 as appointment date which is the day she scheduled off from work,I did not know how to rectify please call her

## 2019-10-22 NOTE — TELEPHONE ENCOUNTER
Contacted and spoke with patient in regards to his appointment for stent removal  Informed patient Dr Janki Genao unable to accommodate the 28th for stent removal and need to keep the 29th  Patient does not have transportation for that day  Arrangements were made for October 28 2019  Also the disability paperwork was dated the 28th  Patient will contact office if he cannot keep the 29th appointment

## 2019-10-29 ENCOUNTER — PROCEDURE VISIT (OUTPATIENT)
Dept: UROLOGY | Facility: CLINIC | Age: 64
End: 2019-10-29
Payer: COMMERCIAL

## 2019-10-29 VITALS
WEIGHT: 199 LBS | HEIGHT: 70 IN | HEART RATE: 70 BPM | SYSTOLIC BLOOD PRESSURE: 150 MMHG | BODY MASS INDEX: 28.49 KG/M2 | DIASTOLIC BLOOD PRESSURE: 80 MMHG

## 2019-10-29 DIAGNOSIS — N52.9 ERECTILE DYSFUNCTION, UNSPECIFIED ERECTILE DYSFUNCTION TYPE: ICD-10-CM

## 2019-10-29 DIAGNOSIS — Q62.5 DUPLICATED LEFT RENAL COLLECTING SYSTEM: ICD-10-CM

## 2019-10-29 DIAGNOSIS — C61 PROSTATE CANCER (HCC): Primary | ICD-10-CM

## 2019-10-29 PROCEDURE — 99213 OFFICE O/P EST LOW 20 MIN: CPT | Performed by: UROLOGY

## 2019-10-29 PROCEDURE — 52310 CYSTOSCOPY AND TREATMENT: CPT | Performed by: UROLOGY

## 2019-10-29 RX ORDER — TADALAFIL 5 MG/1
5 TABLET ORAL DAILY PRN
Qty: 30 TABLET | Refills: 3 | Status: SHIPPED | OUTPATIENT
Start: 2019-10-29 | End: 2020-04-27

## 2019-10-29 RX ORDER — SULFAMETHOXAZOLE AND TRIMETHOPRIM 800; 160 MG/1; MG/1
1 TABLET ORAL EVERY 12 HOURS SCHEDULED
Qty: 6 TABLET | Refills: 0 | Status: SHIPPED | OUTPATIENT
Start: 2019-10-29 | End: 2019-11-01

## 2019-10-29 NOTE — PROGRESS NOTES
UROLOGY FOLLOWUP NOTE     CHIEF COMPLAINT   Lia Ji is a 60 y o  male with a complaint of   Chief Complaint   Patient presents with    Cystoscopy     Stent Removal       History of Present Illness:     60 y o  male who recently underwent PSA testing an outside urologist PSA was elevated at 10  Repeat PSA karen to 13  Patient denies any significant urinary symptoms of infection or inflammation  He denies a family history of prostate cancer although he thinks his father may have had some type of prostate procedure  No prostate cancer in his brothers  Presents today for urgent evaluation  Of note, his daughter is a 77 [facility] recovery room nurse and his wife is also an RN  Lab Results   Component Value Date    PSA 13 4 (H) 04/26/2019    PSA 10 1 (H) 01/09/2019     AUA SS 7, LYLE 19  Uses Viagra 100 mg    We reschedule the procedure with pre-administration of IM gentamycin  Patient does have intermediate risk prostate cancer  He underwent a CT of the pelvis and returns for results  Patient ultimately opted move forward with surgery  Patient underwent his robotic assisted laparoscopic prostatectomy with bilateral pelvic lymph node dissection  Patient did undergo nerve-sparing with a thermal technique on the bilateral pedicles  During the course of his surgery, an abnormal structure was identified emanating off the tip of the left seminal vesicle  Ultimately, 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  This required robotic reimplantation of the upper pole moiety into the bladder  The patient has stents in both the upper and lower pole moiety of his left kidney  Did have issue with epididymo-orchitis in October with scrotal pain  Urine culture was staphylococcus, treated with Bactrim  Returns today to review postoperative PSA and remove stents  Patient is almost fully continent this point time    He has some mild leakage with laughing coughing sneezing  He has not had any return of his erectile function  He has used sildenafil 100 mg without success  He and his wife were very interested in improving and maximizing his erectile function returned      Past Medical History:     Past Medical History:   Diagnosis Date    Arthritis     mostly hands    Bladder stone     Blue baby     at birth /with heart murmur/no longer noted    Carpal tunnel syndrome     Chronic pain disorder     "hands"    Generalized joint pain     GERD (gastroesophageal reflux disease)     "silent"    Herniated disc, cervical     some neck pain    History of kidney stones     [place] (hard of hearing)     "slightly"    Hyperlipidemia     Moderate exercise     "very active lifestyle/walking/at least 4 miles per day/active job"    PONV (postoperative nausea and vomiting)     "almost every time after surgery"    Prostate cancer ([facility] [address] )     Rosacea     Skin cancer     Tinnitus        PAST SURGICAL HISTORY:     Past Surgical History:   Procedure Laterality Date    BACK SURGERY      HERNIATED DISC/Lumbar    CARPAL TUNNEL RELEASE Bilateral     COLONOSCOPY      CT CYSTOGRAM  9/18/2019    FL RETROGRADE PYELOGRAM  9/4/2019    HERNIA REPAIR Right     inguinal    NEUROPLASTY / TRANSPOSITION MEDIAN NERVE AT CARPAL TUNNEL BILATERAL      NH LAP,PROSTATECTOMY,RADICAL,W/NERVE SPARE,INCL ROBOTIC N/A 9/4/2019    Procedure: PROSTATECTOMY RADICAL W ROBOTICS; BILATERAL PELVIC LYMPH NODE DISSECTION LYSIS OF ADHESIONS Robotic; DOUBLE LEFT URETERAL STENT INSERTION with retrograde pyelogram, Instillation of ICG; left upper pole ureteral re-implantation Robotic;  Surgeon: Drake Mendoza MD;  Location: [facility];  Service: Urology    PROSTATE BIOPSY      PROSTATECTOMY      SKIN LESION EXCISION      x2 on nose for melanoma    TONSILLECTOMY         CURRENT MEDICATIONS:     Current Outpatient Medications   Medication Sig Dispense Refill    ALPRAZolam (XANAX) 1 mg tablet Take by mouth daily at bedtime as needed for anxiety      docusate sodium (COLACE) 100 mg capsule Take 1 capsule (100 mg total) by mouth 2 (two) times a day 60 capsule 0    oxybutynin (DITROPAN-XL) 5 mg 24 hr tablet Take 1 tablet (5 mg total) by mouth daily 14 tablet 0    simethicone (MYLICON) 80 mg chewable tablet Chew 1 tablet (80 mg total) 4 (four) times a day as needed for flatulence 30 tablet 0    TURMERIC PO Take by mouth daily      ALPRAZolam (XANAX) 1 mg tablet Take 1 tablet (1 mg total) by mouth once for 1 dose Take one hour prior to cystoscopy, need  1 tablet 0    ondansetron (ZOFRAN-ODT) 4 mg disintegrating tablet Take 1 tablet (4 mg total) by mouth every 4 (four) hours as needed for nausea or vomiting for up to 7 days 15 tablet 0    sildenafil (REVATIO) 20 mg tablet Take 1 tablet (20 mg total) by mouth once for 1 dose On empty stomach, one hour before intercourse 90 tablet 1     No current facility-administered medications for this visit  ALLERGIES:     Allergies   Allergen Reactions    Ciprofloxacin GI Intolerance    Keflex [Cephalexin] GI Intolerance       SOCIAL HISTORY:     Social History     Socioeconomic History    Marital status: /Civil Union     Spouse name: None    Number of children: None    Years of education: None    Highest education level: None   Occupational History    Occupation: EMPLOYED   Social Needs    Financial resource strain: None    Food insecurity:     Worry: None     Inability: None    Transportation needs:     Medical: None     Non-medical: None   Tobacco Use    Smoking status: Never Smoker    Smokeless tobacco: Never Used   Substance and Sexual Activity    Alcohol use:  Yes     Alcohol/week: 1 0 standard drinks     Types: 1 Glasses of wine per week     Frequency: 2-4 times a month    Drug use: No    Sexual activity: None   Lifestyle    Physical activity:     Days per week: None     Minutes per session: None    Stress: None   Relationships    Social connections:     Talks on phone: None     Gets together: None     Attends Confucianism service: None     Active member of club or organization: None     Attends meetings of clubs or organizations: None     Relationship status: None    Intimate partner violence:     Fear of current or ex partner: None     Emotionally abused: None     Physically abused: None     Forced sexual activity: None   Other Topics Concern    None   Social History Narrative        EMPLOYED       SOCIAL HISTORY:     Family History   Problem Relation Age of Onset    Hypertension Mother     Stroke Father        REVIEW OF SYSTEMS:     Review of Systems   Constitutional: Negative  Negative for chills and fever  Cardiovascular: Negative  Gastrointestinal: Negative  Genitourinary: Positive for dysuria and flank pain  Skin: Negative  Psychiatric/Behavioral: Negative  PHYSICAL EXAM:     /80   Pulse 70   Ht 5' 10" (1 778 m)   Wt 90 3 kg (199 lb)   BMI 28 55 kg/m²     General:  Healthy appearing male in no acute distress  They have a normal affect  There is not appear to be any gross neurologic defects or abnormalities  HEENT:  Normocephalic, atraumatic  Neck is supple without any palpable lymphadenopathy  Cardiovascular:  Patient has normal palpable distal radial pulses  There is no significant peripheral edema  No JVD is noted  Respiratory:  Patient has unlabored respirations  There is no audible wheeze or rhonchi  Abdomen:  Abdomen is with healing surgical scars  Abdomen is soft and nontender  There is no tympany  Inguinal and umbilical hernia are not appreciated  Genitourinary: circumcised phallus, orthotopic meatus, descended testicles, no inflammation, no tenderness    Musculoskeletal:  Patient does not have significant CVA tenderness in the  flank with palpation or percussion  They full range of motion in all 4 extremities    Strength in all 4 extremities appears congruent  Patient is able to ambulate without assistance or difficulty  Dermatologic:  Patient has no skin abnormalities or rashes  LABS:     CBC:   Lab Results   Component Value Date    WBC 11 02 (H) 2019    HGB 11 5 (L) 2019    HCT 35 4 (L) 2019    MCV 94 2019     2019       BMP:   Lab Results   Component Value Date    CALCIUM 8 5 2019    K 4 3 2019    CO2 28 2019     2019    BUN 15 2019    CREATININE 1 15 2019     Lab Results   Component Value Date    PSA 13 4 (H) 2019    PSA 10 1 (H) 2019      Ref Range & Units 10/18/19  8:33 AM   PSA, Ultrasensitive 0 000 - 4 000 ng/mL 0 026      Urine Culture >100,000 cfu/ml Staphylococcus aureusAbnormal           Susceptibility      Staphylococcus aureus     JARED     Ampicillin ($$) <=2 00 ug/ml Resistant     Cefazolin ($) <=4 00 ug/ml Susceptible     Gentamicin ($$) <=1 ug/ml Susceptible     Nitrofurantoin <=32 ug/ml Susceptible     Oxacillin <=0 25 ug/ml Susceptible     Tetracycline <=2 ug/ml Susceptible     Trimethoprim + Sulfamethoxazole ($$$) <=0 5/9 5 u  Susceptible     Vancomycin ($) 1 00 ug/ml Susceptible     ZID Performed Yes                   Specimen Collected: 10/10/19 10:33 AM        IMAGIN/24/19  SCROTAL ULTRASOUND     INDICATION:    left scrotal pain, s/p recent radical prostatectomy      COMPARISON: None     TECHNIQUE:   Ultrasound the scrotal contents was performed with a high frequency linear transducer utilizing volumetric sweep imaging as well as standard still image techniques  Imaging performed in longitudinal and transverse orientation  Color and   spectral Doppler evaluation also performed bilaterally      FINDINGS:     TESTES:   Testes are symmetric and normal in size      RIGHT testis = 4 2 x 2 2 x 2 7 cm   Normal contour with homogeneous smooth echotexture    No intratesticular mass lesion or calcifications      LEFT testis = 4 0 x 2 0 x 2 7 cm  Normal contour with homogeneous smooth echotexture  No intratesticular mass lesion or calcifications      Doppler flow within both testes is present and symmetric      EPIDIDYMIDES:   Normal Size  Doppler ultrasound demonstrates normal blood flow  No epididymal lesions      HYDROCELE:  Small bilateral hydroceles      VARICOCELE:  Small left varicocele  This reported to correspond to area of pain      SCROTUM:  Scrotal thickness and appearance within normal limits  No evidence for extratesticular mass or hernia demonstrated      IMPRESSION:     Bilateral testicular flow      Small left varicocele corresponds to area of indicated pain  6/25/19  CT PELVIS WITH IV CONTRAST     INDICATION:   C61: Malignant neoplasm of prostate      COMPARISON:  None      TECHNIQUE: CT examination of the pelvis was performed  Axial, sagittal, and coronal 2D reformatted images were created from the source data and submitted for interpretation      Radiation dose length product (DLP) for this visit:  434 2 mGy-cm   This examination, like all CT scans performed in the [facility], was performed utilizing techniques to minimize radiation dose exposure, including the use of iterative   reconstruction and automated exposure control      IV Contrast:  100 mL of iohexol (OMNIPAQUE)  Enteric Contrast:  Enteric contrast was administered       FINDINGS:     REPRODUCTIVE ORGANS:  Prostamegaly protruding into the bladder base      URINARY BLADDER:  4 mm right bladder calculus     APPENDIX:  No findings to suggest appendicitis      VISUALIZED BOWEL:  Unremarkable      ABDOMINOPELVIC CAVITY:  No ascites or free intraperitoneal air  No lymphadenopathy  VISUALIZED VESSELS:  Unremarkable for patient's age       ABDOMINOPELVIC WALL/INGUINAL REGIONS: Fat-containing left inguinal hernia     OSSEOUS STRUCTURES:  No acute fracture or destructive osseous lesion      IMPRESSION:     No signs of metastatic disease to the pelvis      Prostamegaly      4 mm right bladder calculus  PATHOLOGY:     9/4/19  Addendum 2   C  CK AE1/3 is negative in a lymph node  The original diagnosis is UNCHANGED  Addendum electronically signed by Silas Crocker MD on 9/9/2019 at  2:20 PM   Addendum   Intradepartmental consultation concurs with the diagnosis of prostatic adenocarcinoma  Addendum electronically signed by Silas Crocker MD on 9/9/2019 at 10:30 AM   Final Diagnosis   A  Dorsal vein margin (excision):  - Prostatic adenocarcinoma extending to tissue edges     B  Periprostatic fat and lymph nodes (excision):  - Fibroadipose tissue  - No carcinoma identified      C  Left pelvic lymph node (excision):  - One (1) lymph node negative for carcinoma (0/1)     D  Right pelvic lymph node (excision):  - One (1) lymph node negative for carcinoma (0/1)     E  Prostate (radical prostatectomy):  - Prostatic adenocarcinoma, Larry score 3+4=7   - Calcified nodules   - See staging protocol below (pT3aN0)   Electronically signed by Silas Crocker MD on 9/8/2019 at  5:10 PM   Additional Information    All controls performed with the immunohistochemical stains reported above reacted appropriately  These tests were developed and their performance characteristics determined by [facility] or [facility]  They may not be cleared or approved by the [place]  Food and Drug Administration  The FDA has determined that such clearance or approval is not necessary  These tests are used for clinical purposes  They should not be regarded as investigational or for research  This laboratory has been approved by [facility], designated as a high-complexity laboratory and is qualified to perform these tests     Synoptic Checklist   PROSTATE GLAND: Radical Prostatectomy   Prostate Res - All Specimens   SPECIMEN   Procedure  Radical prostatectomy    Prostate Size     Prostate Weight (g)  46 g   Prostate Greatest Dimension in Centimeters (cm)  4 9 Centimeters (cm)   Additional Dimension in Centimeters (cm)  4 4 Centimeters (cm)     3 9 Centimeters (cm)   TUMOR   Histologic Type  Acinar adenocarcinoma    Histologic Grade     [place] Pattern     Percentage of Pattern 4  20 %   Primary Larry Pattern  Pattern 3    Secondary [place] Pattern  Pattern 4    Tertiary Larry Pattern  Not applicable    Grade Group  2    Intraductal Carcinoma (IDC)  Not identified    Tumor Extent     Tumor Quantitation  Estimated percentage of prostate involved by tumor: 40 %   Extraprostatic Extension (EPE)  Present, nonfocal    Location of Extraprostatic Extension  dorsal vein margin     Seminal Vesicle Invasion  Not identified    Accessory Findings     Treatment Effect  No known presurgical therapy    Lymphovascular Invasion  Not Identified    Perineural Invasion  Present    MARGINS   Margins  Involved by invasive carcinoma      Non-limited (>= 3 mm)    Linear Length of Positive Margin(s) in Millimeters (mm)  15 Millimeters (mm)   Focality  Multifocal    Location of Positive Margin(s)  Right apical      Right anterior      Left apical      Left anterior    Margin Positivity in Area of Extraprostatic Extension (EPE)  Present    Location(s)  doral vein margin     Larry Pattern at Positive Margin(s)  Pattern 4 or 5    LYMPH NODES   Number of Lymph Nodes Involved  0    Number of Lymph Nodes Examined  2    PATHOLOGIC STAGE CLASSIFICATION (pTNM, AJCC 8th Edition)   TNM Descriptors  Not applicable    Primary Tumor (pT)  pT3a    Regional Lymph Nodes (pN)  pN0    Distant Metastasis (pM)  Not applicable - pM cannot be determined from the submitted specimen(s)    ADDITIONAL FINDINGS   Additional Pathologic Findings  High-grade prostatic intraepithelial neoplasia (PIN)              PROCEDURE:     SEE NOTE    ASSESSMENT:     60 y o  male with newly diagnosed [id number] Larry 3+4=7 adenocarcinoma of the prostate, with extraprostatic extension and positive margins at the apical/dorsal vein, s/p additional resected tissue and 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 into seminal vesical requiring LEFT upper pole moeity reimplant    PLAN:     PSA is somewhat reassuring  Will follow ultrasensitive PSA in 3 months  Recommend US in 3 mos to assess renal drainage at that time, now that stents removed  Bactrim DS x 3 days s/p stent removal given prior staph  Patient interested in penile rehabilitation to improve erectile function  Discussed studies recommending Cialis 5 mg daily and vacuum erection device usage  We have filled out the paperwork for the vacuum erection device and prescribe the Cialis  Patient will let me know if there is significant cost constraints

## 2019-10-29 NOTE — PROGRESS NOTES
Cystoscopy  Date/Time: 10/29/2019 9:27 AM  Performed by: Aleah Turner MD  Authorized by: Aleah Turner MD     Procedure details: simple removal of a foreign body, stone, or stent    Patient tolerance: Patient tolerated the procedure well with no immediate complications    Additional Procedure Details:   Cystoscopy with stent removal procedure note: The patient returns to the office today to undergo cystoscopy with stent removal  Risks and benefits of the procedure were discussed and informed consent was obtained  The patient was placed in the modified supine position  Genitalia was prepped with Betadine and draped in a sterile fashion  Viscous 2% lidocaine was used for local anesthesia  The flexible cystoscope was passed  The bladder was inspected  The two LEFT sided stents were identified  The stent was grasped and easily removed fully intact without difficulty  Overall the patient tolerated the procedure

## 2019-10-30 ENCOUNTER — TELEPHONE (OUTPATIENT)
Dept: UROLOGY | Facility: CLINIC | Age: 64
End: 2019-10-30

## 2019-10-30 NOTE — TELEPHONE ENCOUNTER
Vacuum Therapy Device order form fax to [facility]  With face sheet and insurance card information  Confirmation received

## 2020-01-29 ENCOUNTER — TRANSCRIBE ORDERS (OUTPATIENT)
Dept: LAB | Facility: HOSPITAL | Age: 65
End: 2020-01-29

## 2020-01-29 ENCOUNTER — APPOINTMENT (OUTPATIENT)
Dept: LAB | Facility: HOSPITAL | Age: 65
End: 2020-01-29
Payer: COMMERCIAL

## 2020-01-29 ENCOUNTER — HOSPITAL ENCOUNTER (OUTPATIENT)
Dept: ULTRASOUND IMAGING | Facility: HOSPITAL | Age: 65
Discharge: HOME | End: 2020-01-29
Payer: COMMERCIAL

## 2020-01-29 DIAGNOSIS — Q62.5 DUPLICATED LEFT RENAL COLLECTING SYSTEM: ICD-10-CM

## 2020-01-29 DIAGNOSIS — C61 PROSTATE CANCER (HCC): ICD-10-CM

## 2020-01-29 PROCEDURE — 84153 ASSAY OF PSA TOTAL: CPT

## 2020-01-29 PROCEDURE — 76770 US EXAM ABDO BACK WALL COMP: CPT

## 2020-01-29 PROCEDURE — 36415 COLL VENOUS BLD VENIPUNCTURE: CPT

## 2020-01-30 LAB — PSA SERPL DL<=0.01 NG/ML-MCNC: 0.02 NG/ML (ref 0–4)

## 2020-02-07 ENCOUNTER — OFFICE VISIT (OUTPATIENT)
Dept: UROLOGY | Facility: CLINIC | Age: 65
End: 2020-02-07
Payer: COMMERCIAL

## 2020-02-07 VITALS
SYSTOLIC BLOOD PRESSURE: 126 MMHG | WEIGHT: 203 LBS | BODY MASS INDEX: 29.13 KG/M2 | DIASTOLIC BLOOD PRESSURE: 88 MMHG | HEART RATE: 70 BPM

## 2020-02-07 DIAGNOSIS — C61 PROSTATE CANCER (HCC): Primary | ICD-10-CM

## 2020-02-07 PROCEDURE — 99213 OFFICE O/P EST LOW 20 MIN: CPT | Performed by: UROLOGY

## 2020-02-07 PROCEDURE — 1036F TOBACCO NON-USER: CPT | Performed by: UROLOGY

## 2020-02-07 NOTE — PROGRESS NOTES
UROLOGY FOLLOWUP NOTE     CHIEF COMPLAINT   Marlena Walters is a 60 y o  male with a complaint of   Chief Complaint   Patient presents with    Prostate Cancer     History of Present Illness:     60 y o  male who recently underwent PSA testing an outside urologist PSA was elevated at 10  Repeat PSA karen to 13  Patient denies any significant urinary symptoms of infection or inflammation  Lab Results   Component Value Date    PSA 13 4 (H) 04/26/2019    PSA 10 1 (H) 01/09/2019 1/29/20  7:50 AM 10/18/19  8:33 AM    PSA, Ultrasensitive 0 000 - 4 000 ng/mL 0 020  0 026 CM     AUA SS 7, LYLE 19  Uses Viagra 100 mg    Patient underwent his robotic assisted laparoscopic prostatectomy with bilateral pelvic lymph node dissection  Patient did undergo nerve-sparing with a thermal technique on the bilateral pedicles  During the course of his surgery, an abnormal structure was identified emanating off the tip of the left seminal vesicle  Ultimately, 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  This required robotic reimplantation of the upper pole moiety into the bladder  The patient has stents in both the upper and lower pole moiety of his left kidney  Did have postop issue with epididymo-orchitis in October with scrotal pain  Urine culture was staphylococcus, treated with Bactrim  Returns today to review postoperative PSA and remove stents  Patient continues to be mostly continent  He has not had any improvement with the as needed use the Cialis 5 mg  He does get rigidity with vacuum erection device but this is not sustainable  He is not interested in moving forward with injection therapy at this time      Past Medical History:     Past Medical History:   Diagnosis Date    Arthritis     mostly hands    Bladder stone     Blue baby     at birth /with heart murmur/no longer noted    Carpal tunnel syndrome     Chronic pain disorder     "hands"    Generalized joint pain     GERD (gastroesophageal reflux disease)     "silent"    Herniated disc, cervical     some neck pain    History of kidney stones     [place] (hard of hearing)     "slightly"    Hyperlipidemia     Moderate exercise     "very active lifestyle/walking/at least 4 miles per day/active job"    PONV (postoperative nausea and vomiting)     "almost every time after surgery"    Prostate cancer ([address] )     Rosacea     Skin cancer     Tinnitus        PAST SURGICAL HISTORY:     Past Surgical History:   Procedure Laterality Date    BACK SURGERY      HERNIATED DISC/Lumbar    CARPAL TUNNEL RELEASE Bilateral     COLONOSCOPY      CT CYSTOGRAM  9/18/2019    FL RETROGRADE PYELOGRAM  9/4/2019    HERNIA REPAIR Right     inguinal    NEUROPLASTY / TRANSPOSITION MEDIAN NERVE AT CARPAL TUNNEL BILATERAL      NE LAP,PROSTATECTOMY,RADICAL,W/NERVE SPARE,INCL ROBOTIC N/A 9/4/2019    Procedure: PROSTATECTOMY RADICAL W ROBOTICS; BILATERAL PELVIC LYMPH NODE DISSECTION LYSIS OF ADHESIONS Robotic; DOUBLE LEFT URETERAL STENT INSERTION with retrograde pyelogram, Instillation of ICG; left upper pole ureteral re-implantation Robotic;  Surgeon: Darshana Mendez MD;  Location: [facility];  Service: Urology    PROSTATE BIOPSY      PROSTATECTOMY      SKIN LESION EXCISION      x2 on nose for melanoma    TONSILLECTOMY         CURRENT MEDICATIONS:     Current Outpatient Medications   Medication Sig Dispense Refill    ALPRAZolam (XANAX) 1 mg tablet Take 1 tablet (1 mg total) by mouth once for 1 dose Take one hour prior to cystoscopy, need  1 tablet 0    ALPRAZolam (XANAX) 1 mg tablet Take by mouth daily at bedtime as needed for anxiety      docusate sodium (COLACE) 100 mg capsule Take 1 capsule (100 mg total) by mouth 2 (two) times a day (Patient not taking: Reported on 2/7/2020) 60 capsule 0    ondansetron (ZOFRAN-ODT) 4 mg disintegrating tablet Take 1 tablet (4 mg total) by mouth every 4 (four) hours as needed for nausea or vomiting for up to 7 days 15 tablet 0    sildenafil (REVATIO) 20 mg tablet Take 1 tablet (20 mg total) by mouth once for 1 dose On empty stomach, one hour before intercourse 90 tablet 1    simethicone (MYLICON) 80 mg chewable tablet Chew 1 tablet (80 mg total) 4 (four) times a day as needed for flatulence (Patient not taking: Reported on 2/7/2020) 30 tablet 0    tadalafil (CIALIS) 5 MG tablet Take 1 tablet (5 mg total) by mouth daily as needed for erectile dysfunction (Patient not taking: Reported on 2/7/2020) 30 tablet 3    TURMERIC PO Take by mouth daily       No current facility-administered medications for this visit  ALLERGIES:     Allergies   Allergen Reactions    Ciprofloxacin GI Intolerance    Keflex [Cephalexin] GI Intolerance       SOCIAL HISTORY:     Social History     Socioeconomic History    Marital status: /Civil Union     Spouse name: None    Number of children: None    Years of education: None    Highest education level: None   Occupational History    Occupation: EMPLOYED   Social Needs    Financial resource strain: None    Food insecurity:     Worry: None     Inability: None    Transportation needs:     Medical: None     Non-medical: None   Tobacco Use    Smoking status: Never Smoker    Smokeless tobacco: Never Used   Substance and Sexual Activity    Alcohol use:  Yes     Alcohol/week: 1 0 standard drinks     Types: 1 Glasses of wine per week     Frequency: 2-4 times a month    Drug use: No    Sexual activity: None   Lifestyle    Physical activity:     Days per week: None     Minutes per session: None    Stress: None   Relationships    Social connections:     Talks on phone: None     Gets together: None     Attends Ep[employer] service: None     Active member of club or organization: None     Attends meetings of clubs or organizations: None     Relationship status: None    Intimate partner violence: Fear of current or ex partner: None     Emotionally abused: None     Physically abused: None     Forced sexual activity: None   Other Topics Concern    None   Social History Narrative        EMPLOYED       SOCIAL HISTORY:     Family History   Problem Relation Age of Onset    Hypertension Mother     Stroke Father        REVIEW OF SYSTEMS:     Review of Systems   Constitutional: Negative  Respiratory: Negative  Cardiovascular: Negative  Gastrointestinal: Negative  Genitourinary: Negative  Negative for enuresis  Musculoskeletal: Negative  Skin: Negative  Psychiatric/Behavioral: Negative  PHYSICAL EXAM:     /88   Pulse 70   Wt 92 1 kg (203 lb)   BMI 29 13 kg/m²     General:  Healthy appearing male in no acute distress  They have a normal affect  There is not appear to be any gross neurologic defects or abnormalities  HEENT:  Normocephalic, atraumatic  Neck is supple without any palpable lymphadenopathy  Cardiovascular:  Patient has normal palpable distal radial pulses  There is no significant peripheral edema  No JVD is noted  Respiratory:  Patient has unlabored respirations  There is no audible wheeze or rhonchi  Abdomen:  Abdomen is   With healed surgical scars  Abdomen is soft and nontender  There is no tympany  Inguinal and umbilical hernia are not appreciated  Genitourinary: no penile lesions or discharge, no testicular masses or tenderness, no hernias    Musculoskeletal:  Patient does not have significant CVA tenderness in the  flank with palpation or percussion  They full range of motion in all 4 extremities  Strength in all 4 extremities appears congruent  Patient is able to ambulate without assistance or difficulty  Dermatologic:  Patient has no skin abnormalities or rashes        LABS:     CBC:   Lab Results   Component Value Date    WBC 11 02 (H) 09/06/2019    HGB 11 5 (L) 09/06/2019    HCT 35 4 (L) 09/06/2019    MCV 94 09/06/2019     2019       BMP:   Lab Results   Component Value Date    CALCIUM 8 5 2019    K 4 3 2019    CO2 28 2019     2019    BUN 15 2019    CREATININE 1 15 2019     Lab Results   Component Value Date    PSA 13 4 (H) 2019    PSA 10 1 (H) 2019  7:50 AM 10/18/19  8:33 AM    PSA, Ultrasensitive 0 000 - 4 000 ng/mL 0 020  0 026 CM         IMAGIN/29/20  RENAL ULTRASOUND     INDICATION:   C61: Malignant neoplasm of prostate  S72 9: Duplication of ureter      COMPARISON: 2019; 2019     TECHNIQUE:   Ultrasound of the retroperitoneum was performed with a curvilinear transducer utilizing volumetric sweeps and still imaging techniques       FINDINGS:     KIDNEYS:  Symmetric and normal size  Right kidney:  11 8 cm  Left kidney:  12 4 cm      Right kidney  Normal echogenicity and contour  No suspicious masses detected  No hydronephrosis  No shadowing calculi  No perinephric fluid collections      Left kidney  Normal echogenicity and contour  No suspicious masses detected  No hydronephrosis  No shadowing calculi  No perinephric fluid collections      URETERS:  Nonvisualized      BLADDER:   Normally distended  No focal thickening or mass lesions            IMPRESSION:     Normal      PATHOLOGY:     19  Addendum 2   C  CK AE1/3 is negative in a lymph node  The original diagnosis is UNCHANGED  Addendum electronically signed by Saadia Stephenson MD on 2019 at  2:20 PM   Addendum   Intradepartmental consultation concurs with the diagnosis of prostatic adenocarcinoma  Addendum electronically signed by Saadia Stephenson MD on 2019 at 10:30 AM   Final Diagnosis   A  Dorsal vein margin (excision):  - Prostatic adenocarcinoma extending to tissue edges     B  Periprostatic fat and lymph nodes (excision):  - Fibroadipose tissue  - No carcinoma identified      C   Left pelvic lymph node (excision):  - One (1) lymph node negative for carcinoma (0/1)     D  Right pelvic lymph node (excision):  - One (1) lymph node negative for carcinoma (0/1)     E  Prostate (radical prostatectomy):  - Prostatic adenocarcinoma, Cascade score 3+4=7   - Calcified nodules   - See staging protocol below (pT3aN0)   Electronically signed by Isadora Munoz MD on 9/8/2019 at  5:10 PM   Additional Information    All controls performed with the immunohistochemical stains reported above reacted appropriately  These tests were developed and their performance characteristics determined by [facility]  They may not be cleared or approved by the [place]  Food and Drug Administration  The FDA has determined that such clearance or approval is not necessary  These tests are used for clinical purposes  They should not be regarded as investigational or for research  This laboratory has been approved by [place] [id number], designated as a high-complexity laboratory and is qualified to perform these tests     Synoptic Checklist   PROSTATE GLAND: Radical Prostatectomy   Prostate Res - All Specimens   SPECIMEN   Procedure  Radical prostatectomy    Prostate Size     Prostate Weight (g)  46 g   Prostate Greatest Dimension in Centimeters (cm)  4 9 Centimeters (cm)   Additional Dimension in Centimeters (cm)  4 4 Centimeters (cm)     3 9 Centimeters (cm)   TUMOR   Histologic Type  Acinar adenocarcinoma    Histologic Grade     Cascade Pattern     Percentage of Pattern 4  20 %   Primary Cascade Pattern  Pattern 3    Secondary Larry Pattern  Pattern 4    Tertiary Larry Pattern  Not applicable    Grade Group  2    Intraductal Carcinoma (IDC)  Not identified    Tumor Extent     Tumor Quantitation  Estimated percentage of prostate involved by tumor: 40 %   Extraprostatic Extension (EPE)  Present, nonfocal    Location of Extraprostatic Extension  dorsal vein margin     Seminal Vesicle Invasion  Not identified    Accessory Findings     Treatment Effect  No known presurgical therapy    Lymphovascular Invasion  Not Identified    Perineural Invasion  Present    MARGINS   Margins  Involved by invasive carcinoma      Non-limited (>= 3 mm)    Linear Length of Positive Margin(s) in Millimeters (mm)  15 Millimeters (mm)   Focality  Multifocal    Location of Positive Margin(s)  Right apical      Right anterior      Left apical      Left anterior    Margin Positivity in Area of Extraprostatic Extension (EPE)  Present    Location(s)  doral vein margin     [place] Pattern at Positive Margin(s)  Pattern 4 or 5    LYMPH NODES   Number of Lymph Nodes Involved  0    Number of Lymph Nodes Examined  2    PATHOLOGIC STAGE CLASSIFICATION (pTNM, AJCC 8th Edition)   TNM Descriptors  Not applicable    Primary Tumor (pT)  pT3a    Regional Lymph Nodes (pN)  pN0    Distant Metastasis (pM)  Not applicable - pM cannot be determined from the submitted specimen(s)    ADDITIONAL FINDINGS   Additional Pathologic Findings  High-grade prostatic intraepithelial neoplasia (PIN)      ASSESSMENT:     60 y o  male with newly diagnosed [id number] Larry 3+4=7 adenocarcinoma of the prostate, with extraprostatic extension and positive margins at the apical/dorsal vein, s/p additional resected tissue and 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

## 2020-04-21 ENCOUNTER — APPOINTMENT (OUTPATIENT)
Dept: LAB | Facility: CLINIC | Age: 65
End: 2020-04-21
Payer: COMMERCIAL

## 2020-04-21 DIAGNOSIS — C61 PROSTATE CANCER (HCC): ICD-10-CM

## 2020-04-21 PROCEDURE — 84153 ASSAY OF PSA TOTAL: CPT

## 2020-04-21 PROCEDURE — 36415 COLL VENOUS BLD VENIPUNCTURE: CPT

## 2020-04-22 LAB — PSA SERPL DL<=0.01 NG/ML-MCNC: 0.01 NG/ML (ref 0–4)

## 2020-04-27 ENCOUNTER — TELEPHONE (OUTPATIENT)
Dept: UROLOGY | Facility: CLINIC | Age: 65
End: 2020-04-27

## 2020-04-27 ENCOUNTER — TELEMEDICINE (OUTPATIENT)
Dept: UROLOGY | Facility: CLINIC | Age: 65
End: 2020-04-27
Payer: COMMERCIAL

## 2020-04-27 DIAGNOSIS — C61 PROSTATE CANCER (HCC): Primary | ICD-10-CM

## 2020-04-27 DIAGNOSIS — N52.9 ERECTILE DYSFUNCTION, UNSPECIFIED ERECTILE DYSFUNCTION TYPE: ICD-10-CM

## 2020-04-27 PROCEDURE — 99442 PR PHYS/QHP TELEPHONE EVALUATION 11-20 MIN: CPT | Performed by: UROLOGY

## 2020-04-27 RX ORDER — TADALAFIL 5 MG/1
5 TABLET ORAL DAILY
Qty: 90 TABLET | Refills: 1 | Status: SHIPPED | OUTPATIENT
Start: 2020-04-27 | End: 2020-08-27

## 2020-07-27 ENCOUNTER — TELEPHONE (OUTPATIENT)
Dept: UROLOGY | Facility: MEDICAL CENTER | Age: 65
End: 2020-07-27

## 2020-07-27 NOTE — TELEPHONE ENCOUNTER
Patients appointment r/s to September but expiration date on PSA still reflects July appointment  Kindly update and send to patients address on file

## 2020-07-28 ENCOUNTER — APPOINTMENT (OUTPATIENT)
Dept: RADIOLOGY | Facility: CLINIC | Age: 65
End: 2020-07-28
Payer: COMMERCIAL

## 2020-07-28 ENCOUNTER — OFFICE VISIT (OUTPATIENT)
Dept: URGENT CARE | Facility: CLINIC | Age: 65
End: 2020-07-28
Payer: COMMERCIAL

## 2020-07-28 ENCOUNTER — TELEPHONE (OUTPATIENT)
Dept: URGENT CARE | Facility: CLINIC | Age: 65
End: 2020-07-28

## 2020-07-28 VITALS
OXYGEN SATURATION: 98 % | BODY MASS INDEX: 29.06 KG/M2 | RESPIRATION RATE: 18 BRPM | WEIGHT: 203 LBS | TEMPERATURE: 98.8 F | HEIGHT: 70 IN | HEART RATE: 60 BPM | DIASTOLIC BLOOD PRESSURE: 74 MMHG | SYSTOLIC BLOOD PRESSURE: 151 MMHG

## 2020-07-28 DIAGNOSIS — M65.4 DE QUERVAIN'S DISEASE (TENOSYNOVITIS): Primary | ICD-10-CM

## 2020-07-28 DIAGNOSIS — S69.92XA LEFT WRIST INJURY, INITIAL ENCOUNTER: ICD-10-CM

## 2020-07-28 PROCEDURE — G0382 LEV 3 HOSP TYPE B ED VISIT: HCPCS | Performed by: PHYSICIAN ASSISTANT

## 2020-07-28 PROCEDURE — 73110 X-RAY EXAM OF WRIST: CPT

## 2020-07-28 NOTE — TELEPHONE ENCOUNTER
Called and informed patient of xray results, age indeterminate fracture  It is in the area that he has pain, so recommend follow up with ortho  Patient was given ortho's phone number

## 2020-07-28 NOTE — PROGRESS NOTES
[facility]    NAME: Kai Robertson is a 60 y o  male  : 1955    MRN: [id number]  DATE: 2020  TIME: 11:07 AM    Assessment and Plan   De Quervain's disease (tenosynovitis) [M65 4]  1  De Quervain's disease (tenosynovitis)     2  Left wrist injury, initial encounter  XR wrist 3+ vw left       Patient Instructions   Patient Instructions   Possible old fracture/calcification noted on xray  Placed patient in thumb spica  Advised to Leave it on until final report is back  Will follow up with radiologist report when available  Recommend elevating body part, icing the area every 2 hours for 20-30 minutes, take Ibuprofen every 6-8 hours to reduce inflammation  If not improving over the next week, follow up with PCP or orthopedics  Chief Complaint     Chief Complaint   Patient presents with    Wrist Pain     left x 1 day no injury       History of Present Illness   62-year-old male here with complaint of left wrist pain and injury  Patient states that started bothering him yesterday  Denies any known injury  Denies doing repetitive tasks  Has pain along the radial aspect of the wrist   Is worse with flexion, extension and radial and ulnar deviation  Nothing has seemed to help the pain  Has not had pain or issues with this wrist in the past       Review of Systems   Review of Systems   Constitutional: Negative for chills and fever  Respiratory: Negative for cough and shortness of breath  Cardiovascular: Negative for chest pain     Musculoskeletal:        Left wrist pain, see HPI       Current Medications     Current Outpatient Medications:     ALPRAZolam (XANAX) 1 mg tablet, Take 1 tablet (1 mg total) by mouth once for 1 dose Take one hour prior to cystoscopy, need , Disp: 1 tablet, Rfl: 0    ALPRAZolam (XANAX) 1 mg tablet, Take by mouth daily at bedtime as needed for anxiety, Disp: , Rfl:     docusate sodium (COLACE) 100 mg capsule, Take 1 capsule (100 mg total) by mouth 2 (two) times a day (Patient not taking: Reported on 2/7/2020), Disp: 60 capsule, Rfl: 0    ondansetron (ZOFRAN-ODT) 4 mg disintegrating tablet, Take 1 tablet (4 mg total) by mouth every 4 (four) hours as needed for nausea or vomiting for up to 7 days, Disp: 15 tablet, Rfl: 0    simethicone (MYLICON) 80 mg chewable tablet, Chew 1 tablet (80 mg total) 4 (four) times a day as needed for flatulence (Patient not taking: Reported on 2/7/2020), Disp: 30 tablet, Rfl: 0    tadalafil (CIALIS) 5 MG tablet, Take 1 tablet (5 mg total) by mouth daily (Patient not taking: Reported on 7/28/2020), Disp: 90 tablet, Rfl: 1    TURMERIC PO, Take by mouth daily, Disp: , Rfl:     Current Allergies     Allergies as of 07/28/2020 - Reviewed 07/28/2020   Allergen Reaction Noted    Ciprofloxacin GI Intolerance 06/10/2019    Keflex [cephalexin] GI Intolerance 06/10/2019          The following portions of the patient's history were reviewed and updated as appropriate: allergies, current medications, past family history, past medical history, past social history, past surgical history and problem list    Past Medical History:   Diagnosis Date    Arthritis     mostly hands    Bladder stone     Blue baby     at birth /with heart murmur/no longer noted    Carpal tunnel syndrome     Chronic pain disorder     "hands"    Generalized joint pain     GERD (gastroesophageal reflux disease)     "silent"    Herniated disc, cervical     some neck pain    History of kidney stones     [place] (hard of hearing)     "slightly"    Hyperlipidemia     Moderate exercise     "very active lifestyle/walking/at least 4 miles per day/active job"    PONV (postoperative nausea and vomiting)     "almost every time after surgery"    Prostate cancer [address] [address]     Rosacea     Skin cancer     Tinnitus      Past Surgical History:   Procedure Laterality Date    BACK SURGERY      HERNIATED DISC/Lumbar    CARPAL TUNNEL RELEASE Bilateral     COLONOSCOPY  CT CYSTOGRAM  9/18/2019    FL RETROGRADE PYELOGRAM  9/4/2019    HERNIA REPAIR Right     inguinal    NEUROPLASTY / TRANSPOSITION MEDIAN NERVE AT CARPAL TUNNEL BILATERAL      SD LAP,PROSTATECTOMY,RADICAL,W/NERVE SPARE,INCL ROBOTIC N/A 9/4/2019    Procedure: PROSTATECTOMY RADICAL W ROBOTICS; BILATERAL PELVIC LYMPH NODE DISSECTION LYSIS OF ADHESIONS Robotic; DOUBLE LEFT URETERAL STENT INSERTION with retrograde pyelogram, Instillation of ICG; left upper pole ureteral re-implantation Robotic;  Surgeon: Daisy Summers MD;  Location: [facility];  Service: Urology    PROSTATE BIOPSY      PROSTATECTOMY      SKIN LESION EXCISION      x2 on nose for melanoma    TONSILLECTOMY       Family History   Problem Relation Age of Onset    Hypertension Mother     Stroke Father      Social History     Socioeconomic History    Marital status: /Civil Union     Spouse name: Not on file    Number of children: Not on file    Years of education: Not on file    Highest education level: Not on file   Occupational History    Occupation: EMPLOYED   Social Needs    Financial resource strain: Not on file    Food insecurity:     Worry: Not on file     Inability: Not on file    Transportation needs:     Medical: Not on file     Non-medical: Not on file   Tobacco Use    Smoking status: Never Smoker    Smokeless tobacco: Never Used   Substance and Sexual Activity    Alcohol use:  Yes     Alcohol/week: 1 0 standard drinks     Types: 1 Glasses of wine per week     Frequency: 2-4 times a month    Drug use: No    Sexual activity: Not on file   Lifestyle    Physical activity:     Days per week: Not on file     Minutes per session: Not on file    Stress: Not on file   Relationships    Social connections:     Talks on phone: Not on file     Gets together: Not on file     Attends Yazdanism service: Not on file     Active member of club or organization: Not on file     Attends meetings of clubs or organizations: Not on file     Relationship status: Not on file    Intimate partner violence:     Fear of current or ex partner: Not on file     Emotionally abused: Not on file     Physically abused: Not on file     Forced sexual activity: Not on file   Other Topics Concern    Not on file   Social History Narrative        EMPLOYED     Medications have been verified  Objective   /74   Pulse 60   Temp 98 8 °F (37 1 °C)   Resp 18   Ht 5' 10" (1 778 m)   Wt 92 1 kg (203 lb)   SpO2 98%   BMI 29 13 kg/m²      Physical Exam   Physical Exam   Constitutional: He appears well-developed and well-nourished  No distress  HENT:   Head: Normocephalic and atraumatic  Cardiovascular: Normal rate, regular rhythm, normal heart sounds and intact distal pulses  Pulmonary/Chest: Effort normal and breath sounds normal    Musculoskeletal:        Left wrist: He exhibits decreased range of motion and tenderness (There is tenderness along the radial aspect of the left wrist )  He exhibits no swelling, no effusion, no crepitus, no deformity and no laceration  There is good  strength bilaterally  Increased pain with ulnar deviation of the wrist   Positive Finkelstein  Has good thumb opposition  Cap refill less than 2 seconds  Sensation intact to left wrist and hand  Nursing note and vitals reviewed

## 2020-07-28 NOTE — PATIENT INSTRUCTIONS
Possible old fracture/calcification noted on xray  Placed patient in thumb spica  Advised to Leave it on until final report is back  Will follow up with radiologist report when available  Recommend elevating body part, icing the area every 2 hours for 20-30 minutes, take Ibuprofen every 6-8 hours to reduce inflammation  If not improving over the next week, follow up with PCP or orthopedics

## 2020-08-21 ENCOUNTER — APPOINTMENT (OUTPATIENT)
Dept: LAB | Facility: CLINIC | Age: 65
End: 2020-08-21
Payer: COMMERCIAL

## 2020-08-21 DIAGNOSIS — C61 PROSTATE CANCER (HCC): ICD-10-CM

## 2020-08-21 PROCEDURE — 84153 ASSAY OF PSA TOTAL: CPT

## 2020-08-21 PROCEDURE — 36415 COLL VENOUS BLD VENIPUNCTURE: CPT

## 2020-08-22 LAB — PSA SERPL DL<=0.01 NG/ML-MCNC: 0.02 NG/ML (ref 0–4)

## 2020-08-27 ENCOUNTER — OFFICE VISIT (OUTPATIENT)
Dept: UROLOGY | Facility: CLINIC | Age: 65
End: 2020-08-27
Payer: COMMERCIAL

## 2020-08-27 VITALS
TEMPERATURE: 98.4 F | SYSTOLIC BLOOD PRESSURE: 142 MMHG | BODY MASS INDEX: 28.98 KG/M2 | DIASTOLIC BLOOD PRESSURE: 86 MMHG | HEART RATE: 68 BPM | WEIGHT: 202 LBS

## 2020-08-27 DIAGNOSIS — N52.9 ERECTILE DYSFUNCTION, UNSPECIFIED ERECTILE DYSFUNCTION TYPE: ICD-10-CM

## 2020-08-27 DIAGNOSIS — C61 PROSTATE CANCER (HCC): Primary | ICD-10-CM

## 2020-08-27 PROCEDURE — 99213 OFFICE O/P EST LOW 20 MIN: CPT | Performed by: UROLOGY

## 2020-08-27 PROCEDURE — 1036F TOBACCO NON-USER: CPT | Performed by: UROLOGY

## 2020-08-27 RX ORDER — SILDENAFIL CITRATE 20 MG/1
20 TABLET ORAL ONCE
Qty: 90 TABLET | Refills: 0 | Status: SHIPPED | OUTPATIENT
Start: 2020-08-27 | End: 2020-12-21

## 2020-08-27 NOTE — PROGRESS NOTES
UROLOGY FOLLOWUP NOTE     CHIEF COMPLAINT   Rg Aguilar is a 60 y o  male with a complaint of   Chief Complaint   Patient presents with    Prostate Cancer     History of Present Illness:     60 y o  male who recently underwent PSA testing an outside urologist PSA was elevated at 10  Repeat PSA karen to 13  Patient denies any significant urinary symptoms of infection or inflammation  AUA SS 7, LYLE 19  Used Viagra 100 mg preoperatively    Patient underwent his robotic assisted laparoscopic prostatectomy with bilateral pelvic lymph node dissection  Patient did undergo nerve-sparing with a thermal technique on the bilateral pedicles  During the course of his surgery, an abnormal structure was identified emanating off the tip of the left seminal vesicle  Ultimately, 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  This required robotic reimplantation of the upper pole moiety into the bladder  The patient has stents in both the upper and lower pole moiety of his left kidney  Patient continues to be mostly continent  He has not had any improvement with the as needed use the Cialis 5 mg  He does get rigidity with vacuum erection device but this is not sustainable  He finds is uncomfortable  He is not interested in moving forward with injection therapy at this time      Past Medical History:     Past Medical History:   Diagnosis Date    Arthritis     mostly hands    Bladder stone     Blue baby     at birth /with heart murmur/no longer noted    Carpal tunnel syndrome     Chronic pain disorder     "hands"    Generalized joint pain     GERD (gastroesophageal reflux disease)     "silent"    Herniated disc, cervical     some neck pain    History of kidney stones     [place] (hard of hearing)     "slightly"    Hyperlipidemia     Moderate exercise     "very active lifestyle/walking/at least 4 miles per day/active job"    PONV (postoperative nausea and vomiting)     "almost every time after surgery"    Prostate cancer ([address] )     Rosacea     Skin cancer     Tinnitus        PAST SURGICAL HISTORY:     Past Surgical History:   Procedure Laterality Date    BACK SURGERY      HERNIATED DISC/Lumbar    CARPAL TUNNEL RELEASE Bilateral     COLONOSCOPY      CT CYSTOGRAM  9/18/2019    FL RETROGRADE PYELOGRAM  9/4/2019    HERNIA REPAIR Right     inguinal    NEUROPLASTY / TRANSPOSITION MEDIAN NERVE AT CARPAL TUNNEL BILATERAL      LA LAP,PROSTATECTOMY,RADICAL,W/NERVE SPARE,INCL ROBOTIC N/A 9/4/2019    Procedure: PROSTATECTOMY RADICAL W ROBOTICS; BILATERAL PELVIC LYMPH NODE DISSECTION LYSIS OF ADHESIONS Robotic; DOUBLE LEFT URETERAL STENT INSERTION with retrograde pyelogram, Instillation of ICG; left upper pole ureteral re-implantation Robotic;  Surgeon: Edenilson Calderon MD;  Location: [facility];  Service: Urology    PROSTATE BIOPSY      PROSTATECTOMY      SKIN LESION EXCISION      x2 on nose for melanoma    TONSILLECTOMY         CURRENT MEDICATIONS:     Current Outpatient Medications   Medication Sig Dispense Refill    ALPRAZolam (XANAX) 1 mg tablet Take by mouth daily at bedtime as needed for anxiety      ALPRAZolam (XANAX) 1 mg tablet Take 1 tablet (1 mg total) by mouth once for 1 dose Take one hour prior to cystoscopy, need  1 tablet 0    docusate sodium (COLACE) 100 mg capsule Take 1 capsule (100 mg total) by mouth 2 (two) times a day (Patient not taking: Reported on 2/7/2020) 60 capsule 0    ondansetron (ZOFRAN-ODT) 4 mg disintegrating tablet Take 1 tablet (4 mg total) by mouth every 4 (four) hours as needed for nausea or vomiting for up to 7 days 15 tablet 0    simethicone (MYLICON) 80 mg chewable tablet Chew 1 tablet (80 mg total) 4 (four) times a day as needed for flatulence (Patient not taking: Reported on 2/7/2020) 30 tablet 0    tadalafil (CIALIS) 5 MG tablet Take 1 tablet (5 mg total) by mouth daily (Patient not taking: Reported on 7/28/2020) 90 tablet 1    TURMERIC PO Take by mouth daily       No current facility-administered medications for this visit  ALLERGIES:     Allergies   Allergen Reactions    Ciprofloxacin GI Intolerance    Keflex [Cephalexin] GI Intolerance       SOCIAL HISTORY:     Social History     Socioeconomic History    Marital status: /Civil Union     Spouse name: None    Number of children: None    Years of education: None    Highest education level: None   Occupational History    Occupation: EMPLOYED   Social Needs    Financial resource strain: None    Food insecurity     Worry: None     Inability: None    Transportation needs     Medical: None     Non-medical: None   Tobacco Use    Smoking status: Never Smoker    Smokeless tobacco: Never Used   Substance and Sexual Activity    Alcohol use: Yes     Alcohol/week: 1 0 standard drinks     Types: 1 Glasses of wine per week     Frequency: 2-4 times a month    Drug use: No    Sexual activity: None   Lifestyle    Physical activity     Days per week: None     Minutes per session: None    Stress: None   Relationships    Social connections     Talks on phone: None     Gets together: None     Attends Restorationist service: None     Active member of club or organization: None     Attends meetings of clubs or organizations: None     Relationship status: None    Intimate partner violence     Fear of current or ex partner: None     Emotionally abused: None     Physically abused: None     Forced sexual activity: None   Other Topics Concern    None   Social History Narrative        EMPLOYED       SOCIAL HISTORY:     Family History   Problem Relation Age of Onset    Hypertension Mother     Stroke Father        REVIEW OF SYSTEMS:     Review of Systems   Constitutional: Negative  Respiratory: Negative  Cardiovascular: Negative  Gastrointestinal: Negative  Genitourinary: Negative  Negative for enuresis  Musculoskeletal: Negative  Skin: Negative  Psychiatric/Behavioral: Negative  PHYSICAL EXAM:     /86   Pulse 68   Temp 98 4 °F (36 9 °C)   Wt 91 6 kg (202 lb)   BMI 28 98 kg/m²     General:  Healthy appearing male in no acute distress  They have a normal affect  There is not appear to be any gross neurologic defects or abnormalities  HEENT:  Normocephalic, atraumatic  Neck is supple without any palpable lymphadenopathy  Cardiovascular:  Patient has normal palpable distal radial pulses  There is no significant peripheral edema  No JVD is noted  Respiratory:  Patient has unlabored respirations  There is no audible wheeze or rhonchi  Abdomen:  Abdomen is   With healed surgical scars  Abdomen is soft and nontender  There is no tympany  Inguinal and umbilical hernia are not appreciated  Musculoskeletal:  Patient does not have significant CVA tenderness in the  flank with palpation or percussion  They full range of motion in all 4 extremities  Strength in all 4 extremities appears congruent  Patient is able to ambulate without assistance or difficulty  Dermatologic:  Patient has no skin abnormalities or rashes        LABS:     CBC:   Lab Results   Component Value Date    WBC 11 02 (H) 09/06/2019    HGB 11 5 (L) 09/06/2019    HCT 35 4 (L) 09/06/2019    MCV 94 09/06/2019     09/06/2019       BMP:   Lab Results   Component Value Date    CALCIUM 8 5 09/06/2019    K 4 3 09/06/2019    CO2 28 09/06/2019     09/06/2019    BUN 15 09/06/2019    CREATININE 1 15 09/06/2019     Lab Results   Component Value Date    PSA 13 4 (H) 04/26/2019    PSA 10 1 (H) 01/09/2019     10/18/19 8:33 AM     PSA, Ultrasensitive  0 000 - 4 000 ng/mL  0 026      1/29/20 7:50 AM     PSA, Ultrasensitive  0 000 - 4 000 ng/mL  0 020      4/21/20 7:58 AM     PSA, Ultrasensitive  0 000 - 4 000 ng/mL  0 015      8/21/20 7:54 AM     PSA, Ultrasensitive  0 000 - 4 000 ng/mL  0 024      IMAGING: 1/29/20  RENAL ULTRASOUND     INDICATION:   C61: Malignant neoplasm of prostate  Q22 9: Duplication of ureter      COMPARISON: 9/24/2019; 9/18/2019     TECHNIQUE:   Ultrasound of the retroperitoneum was performed with a curvilinear transducer utilizing volumetric sweeps and still imaging techniques       FINDINGS:     KIDNEYS:  Symmetric and normal size  Right kidney:  11 8 cm  Left kidney:  12 4 cm      Right kidney  Normal echogenicity and contour  No suspicious masses detected  No hydronephrosis  No shadowing calculi  No perinephric fluid collections      Left kidney  Normal echogenicity and contour  No suspicious masses detected  No hydronephrosis  No shadowing calculi  No perinephric fluid collections      URETERS:  Nonvisualized      BLADDER:   Normally distended  No focal thickening or mass lesions            IMPRESSION:     Normal      PATHOLOGY:     9/4/19  Addendum 2   C  CK AE1/3 is negative in a lymph node  The original diagnosis is UNCHANGED  Addendum electronically signed by Adryan Cowan MD on 9/9/2019 at  2:20 PM   Addendum   Intradepartmental consultation concurs with the diagnosis of prostatic adenocarcinoma  Addendum electronically signed by Adryan Cowan MD on 9/9/2019 at 10:30 AM   Final Diagnosis   A  Dorsal vein margin (excision):  - Prostatic adenocarcinoma extending to tissue edges     B  Periprostatic fat and lymph nodes (excision):  - Fibroadipose tissue  - No carcinoma identified      C  Left pelvic lymph node (excision):  - One (1) lymph node negative for carcinoma (0/1)     D  Right pelvic lymph node (excision):  - One (1) lymph node negative for carcinoma (0/1)     E   Prostate (radical prostatectomy):  - Prostatic adenocarcinoma, [place] score 3+4=7   - Calcified nodules   - See staging protocol below (pT3aN0)   Electronically signed by Adryan Cowan MD on 9/8/2019 at  5:10 PM   Additional Information    All controls performed with the immunohistochemical stains reported above reacted appropriately  These tests were developed and their performance characteristics determined by [facility] or [facility]  They may not be cleared or approved by the [place]  Food and Drug Administration  The FDA has determined that such clearance or approval is not necessary  These tests are used for clinical purposes  They should not be regarded as investigational or for research  This laboratory has been approved by [place] [id number], designated as a high-complexity laboratory and is qualified to perform these tests     Synoptic Checklist   PROSTATE GLAND: Radical Prostatectomy   Prostate Res - All Specimens   SPECIMEN   Procedure  Radical prostatectomy    Prostate Size     Prostate Weight (g)  46 g   Prostate Greatest Dimension in Centimeters (cm)  4 9 Centimeters (cm)   Additional Dimension in Centimeters (cm)  4 4 Centimeters (cm)     3 9 Centimeters (cm)   TUMOR   Histologic Type  Acinar adenocarcinoma    Histologic Grade     Larry Pattern     Percentage of Pattern 4  20 %   Primary [place] Pattern  Pattern 3    Secondary Larry Pattern  Pattern 4    Tertiary [place] Pattern  Not applicable    Grade Group  2    Intraductal Carcinoma (IDC)  Not identified    Tumor Extent     Tumor Quantitation  Estimated percentage of prostate involved by tumor: 40 %   Extraprostatic Extension (EPE)  Present, nonfocal    Location of Extraprostatic Extension  dorsal vein margin     Seminal Vesicle Invasion  Not identified    Accessory Findings     Treatment Effect  No known presurgical therapy    Lymphovascular Invasion  Not Identified    Perineural Invasion  Present    MARGINS   Margins  Involved by invasive carcinoma      Non-limited (>= 3 mm)    Linear Length of Positive Margin(s) in Millimeters (mm)  15 Millimeters (mm)   Focality  Multifocal    Location of Positive Margin(s)  Right apical      Right anterior      Left apical      Left anterior    Margin Positivity in Area of Extraprostatic Extension (EPE)  Present    Location(s)  doral vein margin     Larry Pattern at Positive Margin(s)  Pattern 4 or 5    LYMPH NODES   Number of Lymph Nodes Involved  0    Number of Lymph Nodes Examined  2    PATHOLOGIC STAGE CLASSIFICATION (pTNM, AJCC 8th Edition)   TNM Descriptors  Not applicable    Primary Tumor (pT)  pT3a    Regional Lymph Nodes (pN)  pN0    Distant Metastasis (pM)  Not applicable - pM cannot be determined from the submitted specimen(s)    ADDITIONAL FINDINGS   Additional Pathologic Findings  High-grade prostatic intraepithelial neoplasia (PIN)      ASSESSMENT:     60 y o  male with  [id number] [place] 3+4=7 adenocarcinoma of the prostate, with extraprostatic extension and positive margins at the apical/dorsal vein, s/p additional resected tissue and 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 into seminal vesical requiring LEFT upper pole moeity reimplant (SURGERY 9/4/2019)    PLAN:       There continues to be a mild oscillation of the ultrasensitive PSA  I am not seeing a concerning trend that would warrant a referral for salvage radiation at this point  I have recommended a repeat ultrasensitive PSA in 3 months  The patient has not had benefit with 3 months of daily Cialis 5 mg and is not happy with the vacuum erection device  I have recommended a return to maximum dose Viagra or sildenafil 100 mg on demand  The should be taken on an empty stomach an hour before intercourse  The patient's continence is well controlled  He will see me back in 3 months

## 2020-12-04 ENCOUNTER — LAB (OUTPATIENT)
Dept: LAB | Facility: CLINIC | Age: 65
End: 2020-12-04
Payer: COMMERCIAL

## 2020-12-04 DIAGNOSIS — C61 PROSTATE CANCER (HCC): ICD-10-CM

## 2020-12-04 PROCEDURE — 36415 COLL VENOUS BLD VENIPUNCTURE: CPT

## 2020-12-04 PROCEDURE — 84153 ASSAY OF PSA TOTAL: CPT

## 2020-12-05 LAB — PSA SERPL DL<=0.01 NG/ML-MCNC: 0.03 NG/ML (ref 0–4)

## 2020-12-14 ENCOUNTER — TELEPHONE (OUTPATIENT)
Dept: UROLOGY | Facility: MEDICAL CENTER | Age: 65
End: 2020-12-14

## 2020-12-15 ENCOUNTER — OFFICE VISIT (OUTPATIENT)
Dept: UROLOGY | Facility: CLINIC | Age: 65
End: 2020-12-15
Payer: COMMERCIAL

## 2020-12-15 DIAGNOSIS — N52.31 ERECTILE DYSFUNCTION AFTER RADICAL PROSTATECTOMY: ICD-10-CM

## 2020-12-15 DIAGNOSIS — C61 PROSTATE CANCER (HCC): Primary | ICD-10-CM

## 2020-12-15 PROCEDURE — 99213 OFFICE O/P EST LOW 20 MIN: CPT | Performed by: UROLOGY

## 2020-12-21 ENCOUNTER — OFFICE VISIT (OUTPATIENT)
Dept: INTERNAL MEDICINE CLINIC | Facility: CLINIC | Age: 65
End: 2020-12-21
Payer: COMMERCIAL

## 2020-12-21 ENCOUNTER — APPOINTMENT (OUTPATIENT)
Dept: RADIOLOGY | Facility: CLINIC | Age: 65
End: 2020-12-21
Payer: COMMERCIAL

## 2020-12-21 VITALS
DIASTOLIC BLOOD PRESSURE: 84 MMHG | RESPIRATION RATE: 14 BRPM | WEIGHT: 204.8 LBS | SYSTOLIC BLOOD PRESSURE: 156 MMHG | HEIGHT: 70 IN | OXYGEN SATURATION: 98 % | HEART RATE: 65 BPM | TEMPERATURE: 99.6 F | BODY MASS INDEX: 29.32 KG/M2

## 2020-12-21 DIAGNOSIS — M19.041 PRIMARY OSTEOARTHRITIS OF BOTH HANDS: Primary | ICD-10-CM

## 2020-12-21 DIAGNOSIS — M19.041 PRIMARY OSTEOARTHRITIS OF BOTH HANDS: ICD-10-CM

## 2020-12-21 DIAGNOSIS — M19.042 PRIMARY OSTEOARTHRITIS OF BOTH HANDS: Primary | ICD-10-CM

## 2020-12-21 DIAGNOSIS — C61 PROSTATE CANCER (HCC): ICD-10-CM

## 2020-12-21 DIAGNOSIS — M77.11 LATERAL EPICONDYLITIS OF RIGHT ELBOW: ICD-10-CM

## 2020-12-21 DIAGNOSIS — M19.042 PRIMARY OSTEOARTHRITIS OF BOTH HANDS: ICD-10-CM

## 2020-12-21 DIAGNOSIS — K21.9 GASTROESOPHAGEAL REFLUX DISEASE WITHOUT ESOPHAGITIS: ICD-10-CM

## 2020-12-21 PROCEDURE — 99214 OFFICE O/P EST MOD 30 MIN: CPT | Performed by: INTERNAL MEDICINE

## 2020-12-21 PROCEDURE — 73130 X-RAY EXAM OF HAND: CPT

## 2020-12-21 RX ORDER — OMEPRAZOLE 40 MG/1
40 CAPSULE, DELAYED RELEASE ORAL DAILY
Qty: 30 CAPSULE | Refills: 1 | Status: SHIPPED | OUTPATIENT
Start: 2020-12-21 | End: 2021-01-25

## 2020-12-21 RX ORDER — NAPROXEN 500 MG/1
500 TABLET ORAL 2 TIMES DAILY WITH MEALS
Qty: 60 TABLET | Refills: 0 | Status: SHIPPED | OUTPATIENT
Start: 2020-12-21 | End: 2021-01-25 | Stop reason: SDUPTHER

## 2020-12-24 DIAGNOSIS — E78.00 HYPERCHOLESTEROLEMIA: Primary | ICD-10-CM

## 2020-12-24 PROCEDURE — 93000 ELECTROCARDIOGRAM COMPLETE: CPT

## 2021-01-21 ENCOUNTER — IMMUNIZATIONS (OUTPATIENT)
Dept: FAMILY MEDICINE CLINIC | Facility: HOSPITAL | Age: 66
End: 2021-01-21

## 2021-01-21 DIAGNOSIS — Z23 ENCOUNTER FOR IMMUNIZATION: Primary | ICD-10-CM

## 2021-01-21 PROCEDURE — 0011A SARS-COV-2 / COVID-19 MRNA VACCINE (MODERNA) 100 MCG: CPT | Performed by: INTERNAL MEDICINE

## 2021-01-21 PROCEDURE — 91301 SARS-COV-2 / COVID-19 MRNA VACCINE (MODERNA) 100 MCG: CPT | Performed by: INTERNAL MEDICINE

## 2021-01-25 ENCOUNTER — APPOINTMENT (OUTPATIENT)
Dept: LAB | Facility: CLINIC | Age: 66
End: 2021-01-25
Payer: COMMERCIAL

## 2021-01-25 ENCOUNTER — OFFICE VISIT (OUTPATIENT)
Dept: INTERNAL MEDICINE CLINIC | Facility: CLINIC | Age: 66
End: 2021-01-25
Payer: COMMERCIAL

## 2021-01-25 VITALS
TEMPERATURE: 97.8 F | WEIGHT: 202.6 LBS | DIASTOLIC BLOOD PRESSURE: 80 MMHG | RESPIRATION RATE: 18 BRPM | HEART RATE: 77 BPM | OXYGEN SATURATION: 99 % | BODY MASS INDEX: 29.01 KG/M2 | SYSTOLIC BLOOD PRESSURE: 148 MMHG | HEIGHT: 70 IN

## 2021-01-25 DIAGNOSIS — M19.042 PRIMARY OSTEOARTHRITIS OF BOTH HANDS: ICD-10-CM

## 2021-01-25 DIAGNOSIS — M19.041 PRIMARY OSTEOARTHRITIS OF BOTH HANDS: ICD-10-CM

## 2021-01-25 DIAGNOSIS — M25.50 POLYARTHRALGIA: ICD-10-CM

## 2021-01-25 DIAGNOSIS — M25.50 POLYARTHRALGIA: Primary | ICD-10-CM

## 2021-01-25 LAB
ANION GAP SERPL CALCULATED.3IONS-SCNC: 2 MMOL/L (ref 4–13)
BUN SERPL-MCNC: 16 MG/DL (ref 5–25)
CALCIUM SERPL-MCNC: 9.5 MG/DL (ref 8.3–10.1)
CHLORIDE SERPL-SCNC: 105 MMOL/L (ref 100–108)
CO2 SERPL-SCNC: 30 MMOL/L (ref 21–32)
CREAT SERPL-MCNC: 1.07 MG/DL (ref 0.6–1.3)
CRP SERPL QL: 4.7 MG/L
GFR SERPL CREATININE-BSD FRML MDRD: 73 ML/MIN/1.73SQ M
GLUCOSE P FAST SERPL-MCNC: 98 MG/DL (ref 65–99)
POTASSIUM SERPL-SCNC: 4.4 MMOL/L (ref 3.5–5.3)
SODIUM SERPL-SCNC: 137 MMOL/L (ref 136–145)

## 2021-01-25 PROCEDURE — 80048 BASIC METABOLIC PNL TOTAL CA: CPT

## 2021-01-25 PROCEDURE — 36415 COLL VENOUS BLD VENIPUNCTURE: CPT

## 2021-01-25 PROCEDURE — 86431 RHEUMATOID FACTOR QUANT: CPT

## 2021-01-25 PROCEDURE — 86430 RHEUMATOID FACTOR TEST QUAL: CPT

## 2021-01-25 PROCEDURE — 86038 ANTINUCLEAR ANTIBODIES: CPT

## 2021-01-25 PROCEDURE — 99213 OFFICE O/P EST LOW 20 MIN: CPT | Performed by: INTERNAL MEDICINE

## 2021-01-25 PROCEDURE — 86140 C-REACTIVE PROTEIN: CPT

## 2021-01-25 RX ORDER — COLCHICINE 0.6 MG/1
0.6 TABLET ORAL DAILY PRN
Qty: 14 TABLET | Refills: 5 | Status: SHIPPED | OUTPATIENT
Start: 2021-01-25 | End: 2021-06-25 | Stop reason: CLARIF

## 2021-01-25 RX ORDER — NAPROXEN 500 MG/1
500 TABLET ORAL 2 TIMES DAILY WITH MEALS
Qty: 60 TABLET | Refills: 0 | Status: SHIPPED | OUTPATIENT
Start: 2021-01-25 | End: 2021-03-17 | Stop reason: SDUPTHER

## 2021-01-25 NOTE — PROGRESS NOTES
Assessment/Plan:  Continued bilateral hand pain and noted to have issues with AM stiffness and arthralgia that lasts up to 2 hours  We will follow up with markers for CTD    Problem List Items Addressed This Visit     None      Visit Diagnoses     Polyarthralgia    -  Primary    Relevant Medications    colchicine (COLCRYS) 0 6 mg tablet    Other Relevant Orders    RF Screen w/ Reflex to Titer    MART Screen w/ Reflex to Titer/Pattern    C-reactive protein    Primary osteoarthritis of both hands        Relevant Medications    naproxen (NAPROSYN) 500 mg tablet           Diagnoses and all orders for this visit:    Polyarthralgia  -     RF Screen w/ Reflex to Titer; Future  -     MART Screen w/ Reflex to Titer/Pattern; Future  -     C-reactive protein; Future  -     colchicine (COLCRYS) 0 6 mg tablet; Take 1 tablet (0 6 mg total) by mouth daily as needed for muscle/joint pain for up to 14 days    Primary osteoarthritis of both hands  -     naproxen (NAPROSYN) 500 mg tablet; Take 1 tablet (500 mg total) by mouth 2 (two) times a day with meals        No problem-specific Assessment & Plan notes found for this encounter  Subjective: The patient was seen and noted to have arthralgia  Patient ID: Berny Alvarado is a 63 y o  male  The patient was see and examined and noted to have moderate stiffness in his hands that lasts into the morning  He notes no fatigue or fever or chills  The pateint notes that he has not used the Naproxen  We reviewed the xrays that noted degenerative changes and possible psuedogout  We will try colchicine and see if that helps  He notes bilateral knee pain and notes no injury to his hands  The patient states that there are no other issues at this time        The following portions of the patient's history were reviewed and updated as appropriate:   He has a past medical history of Arthritis, Bladder stone, Blue baby, Carpal tunnel syndrome, Chronic pain disorder, Generalized joint pain, GERD (gastroesophageal reflux disease), Herniated disc, cervical, History of kidney stones, [place] (hard of hearing), Hyperlipidemia, Moderate exercise, PONV (postoperative nausea and vomiting), Prostate cancer ([facility] [address] ), Rosacea, Skin cancer, and Tinnitus  ,  does not have any pertinent problems on file  ,   has a past surgical history that includes Neuroplasty / transposition median nerve at carpal tunnel bilateral; Skin lesion excision; Prostate biopsy; Carpal tunnel release (Bilateral); Hernia repair (Right); Back surgery; Tonsillectomy; Colonoscopy; pr lap,prostatectomy,radical,w/nerve spare,incl robotic (N/A, 9/4/2019); FL retrograde pyelogram (9/4/2019); CT cystogram (9/18/2019); and Prostatectomy  ,  family history includes Hypertension in his mother; Stroke in his father  ,   reports that he has never smoked  He has never used smokeless tobacco  He reports current alcohol use of about 1 0 standard drinks of alcohol per week  He reports that he does not use drugs  ,  is allergic to ciprofloxacin and keflex [cephalexin]     Current Outpatient Medications   Medication Sig Dispense Refill    naproxen (NAPROSYN) 500 mg tablet Take 1 tablet (500 mg total) by mouth 2 (two) times a day with meals 60 tablet 0    colchicine (COLCRYS) 0 6 mg tablet Take 1 tablet (0 6 mg total) by mouth daily as needed for muscle/joint pain for up to 14 days 14 tablet 5     No current facility-administered medications for this visit  Review of Systems   Constitutional: Negative for chills, fatigue and fever  HENT: Negative  Respiratory: Negative for cough, chest tightness and shortness of breath  Cardiovascular: Negative for chest pain and palpitations  Gastrointestinal: Negative for abdominal pain, constipation, diarrhea, nausea and vomiting  Genitourinary: Negative  Musculoskeletal: Positive for arthralgias  Negative for back pain, gait problem, joint swelling and myalgias  Skin: Negative      Neurological: Negative  Psychiatric/Behavioral: Negative for dysphoric mood  The patient is not nervous/anxious  Objective:  Vitals:    01/25/21 1030   BP: 148/80   BP Location: Left arm   Patient Position: Sitting   Cuff Size: Adult   Pulse: 77   Resp: 18   Temp: 97 8 °F (36 6 °C)   TempSrc: Temporal   SpO2: 99%   Weight: 91 9 kg (202 lb 9 6 oz)   Height: 5' 10" (1 778 m)     Body mass index is 29 07 kg/m²  Physical Exam  Vitals signs and nursing note reviewed  Constitutional:       Appearance: He is well-developed  HENT:      Head: Normocephalic and atraumatic  Eyes:      Pupils: Pupils are equal, round, and reactive to light  Neck:      Musculoskeletal: Normal range of motion and neck supple  Cardiovascular:      Rate and Rhythm: Normal rate and regular rhythm  Heart sounds: Normal heart sounds  No murmur  Pulmonary:      Effort: Pulmonary effort is normal       Breath sounds: Normal breath sounds  No stridor  No rales  Abdominal:      General: Bowel sounds are normal  There is no distension  Palpations: Abdomen is soft  Tenderness: There is no abdominal tenderness  Musculoskeletal: Normal range of motion  General: No deformity  Right knee: He exhibits normal range of motion  No tenderness found  Left knee: No tenderness found  Skin:     General: Skin is warm and dry  Neurological:      Mental Status: He is alert and oriented to person, place, and time            PHQ-9 Depression Screening    PHQ-9:   Frequency of the following problems over the past two weeks:

## 2021-01-26 LAB
CRYOGLOB RF SER-ACNC: ABNORMAL [IU]/ML
RHEUMATOID FACT SER QL LA: POSITIVE

## 2021-01-27 LAB — RYE IGE QN: NEGATIVE

## 2021-02-08 DIAGNOSIS — M25.50 POLYARTHRALGIA: Primary | ICD-10-CM

## 2021-02-19 ENCOUNTER — IMMUNIZATIONS (OUTPATIENT)
Dept: FAMILY MEDICINE CLINIC | Facility: HOSPITAL | Age: 66
End: 2021-02-19

## 2021-02-19 DIAGNOSIS — Z23 ENCOUNTER FOR IMMUNIZATION: Primary | ICD-10-CM

## 2021-02-19 PROCEDURE — 91301 SARS-COV-2 / COVID-19 MRNA VACCINE (MODERNA) 100 MCG: CPT

## 2021-02-19 PROCEDURE — 0012A SARS-COV-2 / COVID-19 MRNA VACCINE (MODERNA) 100 MCG: CPT

## 2021-03-17 DIAGNOSIS — M19.041 PRIMARY OSTEOARTHRITIS OF BOTH HANDS: ICD-10-CM

## 2021-03-17 DIAGNOSIS — M19.042 PRIMARY OSTEOARTHRITIS OF BOTH HANDS: ICD-10-CM

## 2021-03-17 RX ORDER — NAPROXEN 500 MG/1
500 TABLET ORAL 2 TIMES DAILY WITH MEALS
Qty: 180 TABLET | Refills: 1 | Status: SHIPPED | OUTPATIENT
Start: 2021-03-17 | End: 2021-07-26

## 2021-03-17 NOTE — TELEPHONE ENCOUNTER
Pt needs rx for naproxen (NAPROSYN) 500 mg tablet (, 90 day, pls send to [facility]

## 2021-06-01 ENCOUNTER — APPOINTMENT (OUTPATIENT)
Dept: RADIOLOGY | Facility: MEDICAL CENTER | Age: 66
End: 2021-06-01
Payer: COMMERCIAL

## 2021-06-01 ENCOUNTER — CONSULT (OUTPATIENT)
Dept: RHEUMATOLOGY | Facility: CLINIC | Age: 66
End: 2021-06-01
Payer: COMMERCIAL

## 2021-06-01 ENCOUNTER — APPOINTMENT (OUTPATIENT)
Dept: LAB | Facility: MEDICAL CENTER | Age: 66
End: 2021-06-01
Payer: COMMERCIAL

## 2021-06-01 VITALS
SYSTOLIC BLOOD PRESSURE: 156 MMHG | HEART RATE: 68 BPM | WEIGHT: 201.4 LBS | TEMPERATURE: 97.5 F | DIASTOLIC BLOOD PRESSURE: 84 MMHG | BODY MASS INDEX: 28.83 KG/M2 | HEIGHT: 70 IN

## 2021-06-01 DIAGNOSIS — M25.50 POLYARTHRALGIA: Primary | ICD-10-CM

## 2021-06-01 DIAGNOSIS — M11.20 PSEUDOGOUT: ICD-10-CM

## 2021-06-01 DIAGNOSIS — M79.10 MYALGIA: ICD-10-CM

## 2021-06-01 DIAGNOSIS — M25.50 POLYARTHRALGIA: ICD-10-CM

## 2021-06-01 LAB
25(OH)D3 SERPL-MCNC: 21.6 NG/ML (ref 30–100)
CRP SERPL QL: 3.5 MG/L
ERYTHROCYTE [SEDIMENTATION RATE] IN BLOOD: 12 MM/HOUR (ref 0–19)

## 2021-06-01 PROCEDURE — 86200 CCP ANTIBODY: CPT | Performed by: INTERNAL MEDICINE

## 2021-06-01 PROCEDURE — 85652 RBC SED RATE AUTOMATED: CPT | Performed by: INTERNAL MEDICINE

## 2021-06-01 PROCEDURE — 73030 X-RAY EXAM OF SHOULDER: CPT

## 2021-06-01 PROCEDURE — 82306 VITAMIN D 25 HYDROXY: CPT | Performed by: INTERNAL MEDICINE

## 2021-06-01 PROCEDURE — 86140 C-REACTIVE PROTEIN: CPT | Performed by: INTERNAL MEDICINE

## 2021-06-01 PROCEDURE — 36415 COLL VENOUS BLD VENIPUNCTURE: CPT | Performed by: INTERNAL MEDICINE

## 2021-06-01 PROCEDURE — 99244 OFF/OP CNSLTJ NEW/EST MOD 40: CPT | Performed by: INTERNAL MEDICINE

## 2021-06-01 NOTE — PATIENT INSTRUCTIONS
Do labs  Do right shoulder x-rays  Can take ibuprofen 600mg up to three times a day as needed with food    Return to clinic in 3 months    Osteoarthritis   AMBULATORY CARE:   Osteoarthritis  occurs when cartilage (tissue that cushions a joint) wears away slowly and causes the bones to rub together  Osteoarthritis (OA) is a long-term condition that often affects the hands, neck, lower back, knees, and hips  OA is also called arthrosis or degenerative joint disease  Common signs and symptoms include the following:   · Joint pain that gets worse when you move the joint     · Joint stiffness that decreases after you move the joint     · Decreased range of movement     · Hard, bony enlargement on your fingers or toes    · A grinding or cracking sound when you move your joint    Call your doctor or specialist if:   · You have severe pain  · You cannot move your joint  · You have a fever  · Your joint is red and tender  · You have questions or concerns about your condition or care  Treatment for osteoarthritis  may include any of the following:  · Acetaminophen  decreases pain and fever  It is available without a doctor's order  Ask how much to take and how often to take it  Follow directions  Read the labels of all other medicines you are using to see if they also contain acetaminophen, or ask your doctor or pharmacist  Acetaminophen can cause liver damage if not taken correctly  Do not use more than 4 grams (4,000 milligrams) total of acetaminophen in one day  · NSAIDs , such as ibuprofen, help decrease swelling, pain, and fever  This medicine is available with or without a doctor's order  NSAIDs can cause stomach bleeding or kidney problems in certain people  If you take blood thinner medicine, always ask your healthcare provider if NSAIDs are safe for you  Always read the medicine label and follow directions  · Capsaicin cream  may help decrease pain in your joint       · Prescription pain medicine  may be given  Ask your healthcare provider how to take this medicine safely  Some prescription pain medicines contain acetaminophen  Do not take other medicines that contain acetaminophen without talking to your healthcare provider  Too much acetaminophen may cause liver damage  Prescription pain medicine may cause constipation  Ask your healthcare provider how to prevent or treat constipation  · A steroid injection  may be given if your symptoms get worse  · Physical therapy  is used to teach you exercises to help improve movement and strength, and to decrease pain  A physical therapist may move an area with his or her hands  For example, he or she may move your leg in certain ways to treat osteoarthritis in your hip  · Ultrasound  may be used to treat osteoarthritis in certain areas, such as your knee  Ultrasound produces heat that can relieve pain  · Surgery  may be needed if other treatments do not work  Manage your symptoms:   · Stay active  Physical activity may reduce your pain and improve your ability to do daily activities  Avoid activities that cause pain  Ask your healthcare provider what type of exercise would be best for you  · Maintain a healthy weight  This helps decrease the strain on the joints in your back, hips, knees, ankles, and feet  Ask your healthcare provider what a healthy weight is for you  He or she can help you create a weight loss plan if you are overweight  · Use heat or ice on your joints as directed  Heat and ice help decrease pain, swelling, and muscle spasms  For heat, use a heating pad on a low setting for 20 minutes, or take a warm bath  For ice, use an ice pack, or put crushed ice in a plastic bag  Cover it with a towel before you place it on your joint  Use ice for 15 minutes every hour  · Massage the muscles around the joint  Massage helps relieve pain and stiffness   Your healthcare provider or a physical therapist can show you how to do this  If you have hip OA, another person may need to help you massage the area  · Use a cane, crutches, or a walker if directed  These help protect and relieve pressure on your ankle, knee, and hip joints  You may also be prescribed shoe inserts to decrease pressure in your joints  · Wear flat or low-heeled shoes  This will help decrease pain and reduce pressure on your ankle, knee, and hip joints  Follow up with your healthcare provider as directed:  Write down your questions so you remember to ask them during your visits  © Copyright [address] Information is for End User's use only and may not be sold, redistributed or otherwise used for commercial purposes  All illustrations and images included in CareNotes® are the copyrighted property of [employer]  or Yariel Ovalles   The above information is an  only  It is not intended as medical advice for individual conditions or treatments  Talk to your doctor, nurse or pharmacist before following any medical regimen to see if it is safe and effective for you  Pseudogout   AMBULATORY CARE:   Pseudogout  is a type of arthritis  It is also called calcium pyrophosphate deposition (CPPD)  Pseudogout most often affects the knees  You may also have symptoms in other large joints, including the hip or shoulder  Pseudogout causes calcium crystals to collect in fluid called synovial fluid that surrounds joints  The crystals damage the cartilage and can cause inflammation and pain  Common signs and symptoms of pseudogout:   · Sudden, severe pain in one or more joints    · Swollen, red, warm, painful joints    · Stiff joints in the morning that loosen as you move around    · Reduced range of motion in the joint    · Pain and swelling that lasts up to 2 weeks and that return after periods of no pain or swelling    · Fever    Seek care immediately if:   · You have severe joint pain that you cannot tolerate      · You have a fever or redness that spreads beyond the joint area  Call your doctor if:   · You have new symptoms, such as a rash, after you start treatment  · You have questions or concerns about your condition or care  Medicines: You may need any of the following:  · NSAIDs , such as ibuprofen, help decrease swelling, pain, and fever  This medicine is available with or without a doctor's order  NSAIDs can cause stomach bleeding or kidney problems in certain people  If you take blood thinner medicine, always ask your healthcare provider if NSAIDs are safe for you  Always read the medicine label and follow directions  · Steroids  reduce inflammation and can help your joint stiffness and pain during gout attacks  · Take your medicine as directed  Contact your healthcare provider if you think your medicine is not helping or if you have side effects  Tell him or her if you are allergic to any medicine  Keep a list of the medicines, vitamins, and herbs you take  Include the amounts, and when and why you take them  Bring the list or the pill bottles to follow-up visits  Carry your medicine list with you in case of an emergency  Manage your symptoms:   · Rest your painful joint so it can heal   Your healthcare provider may recommend crutches or a walker if the affected joint is in a leg  · Apply ice to your joint  Ice decreases pain and swelling  Use an ice pack, or put crushed ice in a plastic bag  Cover the ice pack or bag with a towel and apply it to your painful joint for 15 to 20 minutes every hour, or as directed  · Elevate your joint  Elevation helps reduce swelling and pain  Raise your joint above the level of your heart as often as you can  Prop your painful joint on pillows to keep it above your heart comfortably  · Go to physical or occupational therapy as directed  A physical therapist can teach you exercises to improve flexibility and range of motion   You may also be shown non-weight bearing exercises that are safe for your joints, such as swimming  Exercise can help keep your joints flexible and reduce pain  An occupational therapist can help you learn to do your daily activities when your joints are stiff or sore  Follow up with your doctor as directed: You may be referred to a rheumatologist or podiatrist  Write down your questions so you remember to ask them during your visits  © Copyright [address] Information is for End User's use only and may not be sold, redistributed or otherwise used for commercial purposes  All illustrations and images included in CareNotes® are the copyrighted property of [employer]  or [address] [place], [place] [address]lianet   The above information is an  only  It is not intended as medical advice for individual conditions or treatments  Talk to your doctor, nurse or pharmacist before following any medical regimen to see if it is safe and effective for you

## 2021-06-01 NOTE — PROGRESS NOTES
Assessment and Plan: Adrianne Sun is a 63 y o   male who presents as a Rheumatology consult referred by his PCP Stephanie Clements DO for evaluation of possible inflammatory arthritis  Patient's RF was only mildly elevated  Further inflammatory arthritis lab work-up ordered below, which returned unremarkable  Ordered right shoulder x-rays, which reveal moderate right AC joint OA  I let patient know that it is okay to take ibuprofen 600mg up to three times a day as needed with food for arthritis symptoms, whether inflammatory or OA  Patient's past hand x-rays reveal signs of CPPD arthropathy/pseudogout, first line treatment of which is NSAIDs  If patient's arthritis symptoms, especially hand morning stiffness continues to persist at next clinic visit, will consider prescribing hydroxychloroquine for management of likely pseudogout  Plan:  Diagnoses and all orders for this visit:    Polyarthralgia  -     Ambulatory referral to Rheumatology  -     XR shoulder 2+ vw right; Future  -     Cyclic citrul peptide antibody, IgG  -     Sedimentation rate, automated  -     C-reactive protein  -     Vitamin D 25 hydroxy    Myalgia  -     Vitamin D 25 hydroxy    Pseudogout    Follow-up plan: RTC in 3 months        HPI  Adrianne Sun is a 63 y o   male who presents as a Rheumatology consult referred by his PCP Stephanie Clements DO for evaluation of possible inflammatory arthritis  Patient has been having joint pain in hands and legs/knees for several years; for the last 2 years, has been getting progressively worse  Right shoulder has been bothering for past 6 months  Pain is worse in the middle of the night and in morning; has 2 hours of morning stiffness  Pain is better with use and worse with inactivity  Naproxen takes the edge off  Hurts to stand after sitting for some time in hips and knees  He drops things from his hands occasionally      Review of Systems  Review of Systems   Constitutional: Negative for chills, fatigue, fever and unexpected weight change  HENT: Negative for mouth sores and trouble swallowing  Eyes: Negative for pain and visual disturbance  Respiratory: Negative for cough and shortness of breath  Cardiovascular: Negative for chest pain and leg swelling  Gastrointestinal: Negative for abdominal pain, blood in stool, constipation, diarrhea and nausea  Musculoskeletal: Positive for arthralgias and myalgias  Negative for back pain and joint swelling  Skin: Negative for color change and rash  Neurological: Negative for weakness and numbness  Hematological: Negative for adenopathy  Psychiatric/Behavioral: Negative for sleep disturbance  Reviewed and agree      Allergies  Allergies   Allergen Reactions    Ciprofloxacin GI Intolerance    Keflex [Cephalexin] GI Intolerance       Home Medications    Current Outpatient Medications:     naproxen (NAPROSYN) 500 mg tablet, Take 1 tablet (500 mg total) by mouth 2 (two) times a day with meals, Disp: 180 tablet, Rfl: 1    colchicine (COLCRYS) 0 6 mg tablet, Take 1 tablet (0 6 mg total) by mouth daily as needed for muscle/joint pain for up to 14 days (Patient not taking: Reported on 6/1/2021), Disp: 14 tablet, Rfl: 5    Past Medical History  Past Medical History:   Diagnosis Date    Arthritis     mostly hands    Bladder stone     Blue baby     at birth /with heart murmur/no longer noted    Carpal tunnel syndrome     Chronic pain disorder     "hands"    Generalized joint pain     GERD (gastroesophageal reflux disease)     "silent"    Herniated disc, cervical     some neck pain    History of kidney stones     Pala (hard of hearing)     "slightly"    Hyperlipidemia     Moderate exercise     "very active lifestyle/walking/at least 4 miles per day/active job"    PONV (postoperative nausea and vomiting)     "almost every time after surgery"    Prostate cancer ([facility] [address] )     Rosacea     Skin cancer     Tinnitus        Past Surgical History   Past Surgical History:   Procedure Laterality Date    BACK SURGERY      HERNIATED DISC/Lumbar    CARPAL TUNNEL RELEASE Bilateral     COLONOSCOPY      CT CYSTOGRAM  9/18/2019    FL RETROGRADE PYELOGRAM  9/4/2019    HERNIA REPAIR Right     inguinal    NEUROPLASTY / TRANSPOSITION MEDIAN NERVE AT CARPAL TUNNEL BILATERAL      RI LAP,PROSTATECTOMY,RADICAL,W/NERVE SPARE,INCL ROBOTIC N/A 9/4/2019    Procedure: PROSTATECTOMY RADICAL W ROBOTICS; BILATERAL PELVIC LYMPH NODE DISSECTION LYSIS OF ADHESIONS Robotic; DOUBLE LEFT URETERAL STENT INSERTION with retrograde pyelogram, Instillation of ICG; left upper pole ureteral re-implantation Robotic;  Surgeon: Marko Oliva MD;  Location: [facility];  Service: Urology    PROSTATE BIOPSY      PROSTATECTOMY      SKIN LESION EXCISION      x2 on nose for melanoma    TONSILLECTOMY         Family History    Family History   Problem Relation Age of Onset    Hypertension Mother     Stroke Father    mother - Rheumatoid arthritis      Social History  Occupation: retired, works part-time in [employer] store currently; 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, was using hands a lot, did it for 30 years  Social History     Substance and Sexual Activity   Alcohol Use Yes    Alcohol/week: 1 0 standard drinks    Types: 1 Glasses of wine per week    Comment: occasional wine     Social History     Substance and Sexual Activity   Drug Use No     Social History     Tobacco Use   Smoking Status Never Smoker   Smokeless Tobacco Never Used       Objective:  Vitals:    06/01/21 1418   BP: 156/84   BP Location: Left arm   Patient Position: Sitting   Cuff Size: Standard   Pulse: 68   Temp: 97 5 °F (36 4 °C)   TempSrc: Temporal   Weight: 91 4 kg (201 lb 6 4 oz)   Height: 5' 10" (1 778 m)       Physical Exam  Constitutional:       General: He is not in acute distress  Appearance: He is well-developed  HENT:      Head: Normocephalic and atraumatic     Eyes:      General: Lids are normal  No scleral icterus  Conjunctiva/sclera: Conjunctivae normal    Neck:      Thyroid: No thyromegaly  Cardiovascular:      Rate and Rhythm: Normal rate and regular rhythm  Heart sounds: S1 normal and S2 normal  No murmur heard  No friction rub  Pulmonary:      Effort: Pulmonary effort is normal  No tachypnea or respiratory distress  Breath sounds: Normal breath sounds  No wheezing, rhonchi or rales  Musculoskeletal:         General: Tenderness present  Cervical back: Neck supple  No muscular tenderness  Comments: Right AC joint tenderness; Several hand joint tenderness, see below   Lymphadenopathy:      Head:      Right side of head: No submental or submandibular adenopathy  Left side of head: No submental or submandibular adenopathy  Cervical: No cervical adenopathy  Skin:     General: Skin is warm and dry  Findings: No rash  Nails: There is no clubbing  Neurological:      Mental Status: He is alert  Sensory: No sensory deficit  Psychiatric:         Behavior: Behavior normal  Behavior is cooperative  Musculoskeletal--Peripheral Joint Exam:  Physical Exam     Tenderness:   Right hand: 3rd MCP, 4th MCP, 2nd PIP, 3rd PIP, 4th PIP and 5th PIP  Left hand: 3rd PIP, 4th PIP and 5th PIP    SCHWARTZ-28 tender joint count: 9  SCHWARTZ-28 swollen joint count: 0      Reviewed labs and imaging  Imaging:   Right shoulder x-rays 6/1/21  Moderate right AC joint osteoarthritis    Bilateral hand x-rays 12/21/20  Mild conventional degenerative changes in the fingers and thumbs  Involvement of the right third MCP joint and presence of periarticular complications may suggest component of CPPD arthropathy, as well, though no mana chondrocalcinosis is demonstrated      Labs:   Consult on 06/01/2021   Component Date Value Ref Range Status    Cyclic Citrullinated Peptide Ab  06/01/2021 0 5  See comment Final    Sed Rate 06/01/2021 12  0 - 19 mm/hour Final    CRP 06/01/2021 3 5* <3 0 mg/L Final    Vit D, 25-Hydroxy 06/01/2021 21 6* 30 0 - 100 0 ng/mL Final   Appointment on 01/25/2021   Component Date Value Ref Range Status    CRP 01/25/2021 4 7* <3 0 mg/L Final    Sodium 01/25/2021 137  136 - 145 mmol/L Final    Potassium 01/25/2021 4 4  3 5 - 5 3 mmol/L Final    Chloride 01/25/2021 105  100 - 108 mmol/L Final    CO2 01/25/2021 30  21 - 32 mmol/L Final    ANION GAP 01/25/2021 2* 4 - 13 mmol/L Final    BUN 01/25/2021 16  5 - 25 mg/dL Final    Creatinine 01/25/2021 1 07  0 60 - 1 30 mg/dL Final    Standardized to IDMS reference method    Glucose, Fasting 01/25/2021 98  65 - 99 mg/dL Final    Specimen collection should occur prior to Sulfasalazine administration due to the potential for falsely depressed results  Specimen collection should occur prior to Sulfapyridine administration due to the potential for falsely elevated results   Calcium 01/25/2021 9 5  8 3 - 10 1 mg/dL Final    eGFR 01/25/2021 73  ml/min/1 73sq m Final    Rheumatoid Factor 01/25/2021 Positive* Negative Final    See RF Quantitation    MART 01/25/2021 Negative  Negative Final    RF Quantitation 01/25/2021 10 IU/mL* (none) Final   Lab on 12/04/2020   Component Date Value Ref Range Status    PSA, Ultrasensitive 12/04/2020 0 031  0 000 - 4 000 ng/mL Final    Roche ECLIA methodology  According to the American Urological Association, Serum PSA should  decrease and remain at undetectable levels after radical  prostatectomy  The AUA defines biochemical recurrence as an initial  PSA value 0 200 ng/mL or greater followed by a subsequent  confirmatory PSA value 0 200 ng/mL or greater  Values obtained with different assay methods or kits cannot be used  interchangeably  Results cannot be interpreted as absolute evidence  of the presence or absence of malignant disease

## 2021-06-03 LAB — CCP AB SER IA-ACNC: 0.5

## 2021-06-13 NOTE — RESULT ENCOUNTER NOTE
Please let patient know that there are signs osteoarthritis on his right shoulder x-rays, which is wear and tear arthritis  Also, his vitamin-D level is a little low, and I recommend that he take over-the-counter vitamin-D 2530-8237 units a day

## 2021-06-16 ENCOUNTER — APPOINTMENT (OUTPATIENT)
Dept: LAB | Facility: CLINIC | Age: 66
End: 2021-06-16
Payer: COMMERCIAL

## 2021-06-16 ENCOUNTER — APPOINTMENT (OUTPATIENT)
Dept: LAB | Facility: CLINIC | Age: 66
End: 2021-06-16

## 2021-06-16 DIAGNOSIS — Z00.8 HEALTH EXAMINATION IN POPULATION SURVEY: ICD-10-CM

## 2021-06-16 DIAGNOSIS — C61 PROSTATE CANCER (HCC): ICD-10-CM

## 2021-06-16 LAB
CHOLEST SERPL-MCNC: 261 MG/DL (ref 50–200)
EST. AVERAGE GLUCOSE BLD GHB EST-MCNC: 105 MG/DL
HBA1C MFR BLD: 5.3 %
HDLC SERPL-MCNC: 47 MG/DL
LDLC SERPL CALC-MCNC: 192 MG/DL (ref 0–100)
NONHDLC SERPL-MCNC: 214 MG/DL
TRIGL SERPL-MCNC: 111 MG/DL

## 2021-06-16 PROCEDURE — 80061 LIPID PANEL: CPT

## 2021-06-16 PROCEDURE — 84153 ASSAY OF PSA TOTAL: CPT

## 2021-06-16 PROCEDURE — 83036 HEMOGLOBIN GLYCOSYLATED A1C: CPT

## 2021-06-16 PROCEDURE — 36415 COLL VENOUS BLD VENIPUNCTURE: CPT

## 2021-06-17 LAB — PSA SERPL DL<=0.01 NG/ML-MCNC: 0.04 NG/ML (ref 0–4)

## 2021-06-25 ENCOUNTER — OFFICE VISIT (OUTPATIENT)
Dept: UROLOGY | Facility: CLINIC | Age: 66
End: 2021-06-25
Payer: COMMERCIAL

## 2021-06-25 VITALS
HEIGHT: 70 IN | RESPIRATION RATE: 20 BRPM | SYSTOLIC BLOOD PRESSURE: 140 MMHG | DIASTOLIC BLOOD PRESSURE: 86 MMHG | BODY MASS INDEX: 28.92 KG/M2 | HEART RATE: 72 BPM | WEIGHT: 202 LBS

## 2021-06-25 DIAGNOSIS — C61 PROSTATE CANCER (HCC): Primary | ICD-10-CM

## 2021-06-25 PROCEDURE — 99214 OFFICE O/P EST MOD 30 MIN: CPT | Performed by: PHYSICIAN ASSISTANT

## 2021-06-25 RX ORDER — METHOCARBAMOL 500 MG/1
500 TABLET, FILM COATED ORAL 4 TIMES DAILY
COMMUNITY
End: 2021-06-25 | Stop reason: ALTCHOICE

## 2021-06-25 NOTE — PROGRESS NOTES
6/25/2021      Chief Complaint   Patient presents with    Prostate Cancer         Assessment and Plan    63 y o  male    1  pT3a Larry 3+4=7 prostate cancer  Pretreatment PSA 13  S/p RALP with robotic ureteral reimplant September 2019  First postop PSA 0 026  Ultrasensitive PSAs slowly creeping upward current 0 043    After discussion today we have made shared decision to continue PSA monitoring q6 months at this time  Rapid doubling time OR threshold for salvage radiation therapy as PSA nears 0 2  History of Present Illness  Baylee Reyes is a 63 y o  male here for evaluation of   Six-month follow-up prostate cancer with ultrasensitive PSA  He is status post radical prostatectomy with robotic ureteral reimplant (duplication) intraoperatively September 2019  First postop PSA is 0 026  His urine continence has recovered wearing 0 pads per day, has excellent urinary stream   No hematuria  Sexual function has not recovered despite pre and postop PDE five inhibitor use, and he is not interested in further treatment  No incisional discomfort or hernia  Review of Systems   Constitutional: Negative for activity change, appetite change, chills, fever and unexpected weight change  HENT: Negative  Respiratory: Negative  Negative for shortness of breath  Cardiovascular: Negative  Negative for chest pain  Gastrointestinal: Negative for abdominal pain, diarrhea, nausea and vomiting  Endocrine: Negative  Genitourinary: Negative for decreased urine volume, difficulty urinating, dysuria, flank pain, frequency, hematuria and urgency  Musculoskeletal: Negative for back pain and gait problem  Skin: Negative  Allergic/Immunologic: Negative  Neurological: Negative  Hematological: Negative for adenopathy  Does not bruise/bleed easily  Urinary Incontinence Screening      Most Recent Value   Urinary Incontinence   Urinary Incontinence? No   Incomplete emptying?   No   Urinary frequency? No   Urinary urgency? No   Urinary hesitancy? No   Dysuria (painful difficult urination)? No   Nocturia (waking up to use the bathroom)? No   Straining (having to push to go)? No   Weak stream?  No   Intermittent stream?  No   Post void dribbling? No               Vitals  Vitals:    06/25/21 0757   BP: 140/86   Pulse: 72   Resp: 20   Weight: 91 6 kg (202 lb)   Height: 5' 10" (1 778 m)       Physical Exam  Vitals and nursing note reviewed  Constitutional:       General: He is not in acute distress  Appearance: He is well-developed  He is not diaphoretic  HENT:      Head: Normocephalic and atraumatic  Pulmonary:      Effort: Pulmonary effort is normal    Skin:     General: Skin is warm and dry  Neurological:      Mental Status: He is alert and oriented to person, place, and time        Gait: Gait normal    Psychiatric:         Speech: Speech normal          Behavior: Behavior normal            Past History  Past Medical History:   Diagnosis Date    Arthritis     mostly hands    Bladder stone     Blue baby     at birth /with heart murmur/no longer noted    Carpal tunnel syndrome     Chronic pain disorder     "hands"    Generalized joint pain     GERD (gastroesophageal reflux disease)     "silent"    Herniated disc, cervical     some neck pain    History of kidney stones     [place] (hard of hearing)     "slightly"    Hyperlipidemia     Moderate exercise     "very active lifestyle/walking/at least 4 miles per day/active job"    PONV (postoperative nausea and vomiting)     "almost every time after surgery"    Prostate cancer ([facility] [address] )     Rosacea     Skin cancer     Tinnitus      Social History     Socioeconomic History    Marital status: /Civil Union     Spouse name: None    Number of children: None    Years of education: None    Highest education level: None   Occupational History    Occupation: RETIRED   Tobacco Use    Smoking status: Never Smoker    Smokeless tobacco: Never Used   Vaping Use    Vaping Use: Never used   Substance and Sexual Activity    Alcohol use: Yes     Alcohol/week: 1 0 standard drinks     Types: 1 Glasses of wine per week     Comment: occasional wine    Drug use: No    Sexual activity: None   Other Topics Concern    None   Social History Narrative        RETIRED     Social Determinants of Health     Financial Resource Strain:     Difficulty of Paying Living Expenses:    Food Insecurity:     Worried About Running Out of Food in the Last Year:     Ran Out of Food in the Last Year:    Transportation Needs:     Lack of Transportation (Medical):      Lack of Transportation (Non-Medical):    Physical Activity:     Days of Exercise per Week:     Minutes of Exercise per Session:    Stress:     Feeling of Stress :    Social Connections:     Frequency of Communication with Friends and Family:     Frequency of Social Gatherings with Friends and Family:     Attends [employer] Services:     Active Member of Clubs or Organizations:     Attends Club or Organization Meetings:     Marital Status:    Intimate Partner Violence:     Fear of Current or Ex-Partner:     Emotionally Abused:     Physically Abused:     Sexually Abused:      Social History     Tobacco Use   Smoking Status Never Smoker   Smokeless Tobacco Never Used     Family History   Problem Relation Age of Onset    Hypertension Mother     Stroke Father        The following portions of the patient's history were reviewed and updated as appropriate: allergies, current medications, past medical history, past social history, past surgical history and problem list     Results  No results found for this or any previous visit (from the past 1 hour(s)) ]  Lab Results   Component Value Date    PSA 13 4 (H) 04/26/2019    PSA 10 1 (H) 01/09/2019     Lab Results   Component Value Date    CALCIUM 9 5 01/25/2021    K 4 4 01/25/2021    CO2 30 01/25/2021     01/25/2021    BUN 16 01/25/2021    CREATININE 1 07 01/25/2021     Lab Results   Component Value Date    WBC 11 02 (H) 09/06/2019    HGB 11 5 (L) 09/06/2019    HCT 35 4 (L) 09/06/2019    MCV 94 09/06/2019     09/06/2019

## 2021-07-26 ENCOUNTER — OFFICE VISIT (OUTPATIENT)
Dept: INTERNAL MEDICINE CLINIC | Facility: CLINIC | Age: 66
End: 2021-07-26
Payer: COMMERCIAL

## 2021-07-26 VITALS
DIASTOLIC BLOOD PRESSURE: 86 MMHG | RESPIRATION RATE: 14 BRPM | BODY MASS INDEX: 28.75 KG/M2 | OXYGEN SATURATION: 98 % | TEMPERATURE: 98.5 F | WEIGHT: 200.8 LBS | SYSTOLIC BLOOD PRESSURE: 152 MMHG | HEIGHT: 70 IN | HEART RATE: 60 BPM

## 2021-07-26 DIAGNOSIS — M25.50 POLYARTHRALGIA: Primary | ICD-10-CM

## 2021-07-26 DIAGNOSIS — C61 PROSTATE CANCER (HCC): ICD-10-CM

## 2021-07-26 DIAGNOSIS — E78.00 HYPERCHOLESTEROLEMIA: ICD-10-CM

## 2021-07-26 PROCEDURE — 99214 OFFICE O/P EST MOD 30 MIN: CPT | Performed by: INTERNAL MEDICINE

## 2021-07-26 RX ORDER — NABUMETONE 750 MG/1
750 TABLET, FILM COATED ORAL 2 TIMES DAILY
Qty: 60 TABLET | Refills: 5 | Status: SHIPPED | OUTPATIENT
Start: 2021-07-26 | End: 2022-02-01

## 2021-07-26 RX ORDER — IBUPROFEN 200 MG
TABLET ORAL EVERY 6 HOURS PRN
COMMUNITY
End: 2021-07-26

## 2021-07-26 NOTE — PROGRESS NOTES
Assessment/Plan:    Problem List Items Addressed This Visit        Genitourinary    Prostate cancer ([facility] )       Other    Hypercholesterolemia      Other Visit Diagnoses     Polyarthralgia    -  Primary    Relevant Medications    nabumetone (RELAFEN) 750 mg tablet    Other Relevant Orders    C-reactive protein    [facility] Screen w/ Reflex to Titer/Pattern    RF Screen w/ Reflex to Titer    Cyclic citrul peptide antibody, IgG    Basic metabolic panel           Diagnoses and all orders for this visit:    Polyarthralgia  -     C-reactive protein; Future  -     MART Screen w/ Reflex to Titer/Pattern; Future  -     RF Screen w/ Reflex to Titer; Future  -     Cyclic citrul peptide antibody, IgG; Future  -     nabumetone (RELAFEN) 750 mg tablet; Take 1 tablet (750 mg total) by mouth 2 (two) times a day  -     Basic metabolic panel; Future    Prostate cancer ([facility] )    Hypercholesterolemia    Other orders  -     Discontinue: ibuprofen (MOTRIN) 200 mg tablet; Take by mouth every 6 (six) hours as needed for mild pain  -     VITAMIN D PO; Take by mouth daily        No problem-specific Assessment & Plan notes found for this encounter  BMI Counseling: Body mass index is 28 81 kg/m²  The BMI is above normal  Exercise recommendations include exercising 3-5 times per week  No pharmacotherapy was ordered  Subjective: The patient continues to have polyarthralgia of the small joints of his hand     Patient ID: Isabel Newell is a 63 y o  male  Reviewed the notes from Dr Onofre Maxwell (Urology)  His PSA continues to be stable  The patient reviewed the note from Rheumatology (Dr Rosales Leon)  She could not conclude if there was a CD present  We reviewed and discussed his labs  The patient LDL cholesterol was elevated  The patient state that there are no other issues at this time he does exercise on a regular basis        The following portions of the patient's history were reviewed and updated as appropriate:   He has a past medical history of Arthritis, Bladder stone, Blue baby, Carpal tunnel syndrome, Chronic pain disorder, Generalized joint pain, GERD (gastroesophageal reflux disease), Herniated disc, cervical, History of kidney stones, [place] (hard of hearing), Hyperlipidemia, Moderate exercise, PONV (postoperative nausea and vomiting), Prostate cancer ([facility] [address] ), Rosacea, Skin cancer, and Tinnitus  ,  does not have any pertinent problems on file  ,   has a past surgical history that includes Neuroplasty / transposition median nerve at carpal tunnel bilateral; Skin lesion excision; Prostate biopsy; Carpal tunnel release (Bilateral); Hernia repair (Right); Back surgery; Tonsillectomy; Colonoscopy; pr lap,prostatectomy,radical,w/nerve spare,incl robotic (N/A, 9/4/2019); [place] retrograde pyelogram (9/4/2019); CT cystogram (9/18/2019); and Prostatectomy  ,  family history includes Hypertension in his mother; Stroke in his father  ,   reports that he has never smoked  He has never used smokeless tobacco  He reports current alcohol use of about 1 0 standard drinks of alcohol per week  He reports that he does not use drugs  ,  is allergic to ciprofloxacin and keflex [cephalexin]     Current Outpatient Medications   Medication Sig Dispense Refill    VITAMIN D PO Take by mouth daily      nabumetone (RELAFEN) 750 mg tablet Take 1 tablet (750 mg total) by mouth 2 (two) times a day 60 tablet 5     No current facility-administered medications for this visit  Review of Systems   Constitutional: Negative for chills, fatigue and fever  HENT: Negative  Respiratory: Negative for cough, chest tightness and shortness of breath  Cardiovascular: Negative for chest pain and palpitations  Gastrointestinal: Negative for abdominal pain, constipation, diarrhea, nausea and vomiting  Genitourinary: Negative  Musculoskeletal: Negative for back pain and myalgias  Skin: Negative  Neurological: Negative      Psychiatric/Behavioral: Negative for dysphoric mood  The patient is not nervous/anxious  Objective:  Vitals:    07/26/21 0800   BP: 152/86   BP Location: Left arm   Patient Position: Sitting   Cuff Size: Standard   Pulse: 60   Resp: 14   Temp: 98 5 °F (36 9 °C)   SpO2: 98%   Weight: 91 1 kg (200 lb 12 8 oz)   Height: 5' 10" (1 778 m)     Body mass index is 28 81 kg/m²  Physical Exam  Vitals and nursing note reviewed  Constitutional:       Appearance: He is well-developed  HENT:      Head: Normocephalic and atraumatic  Eyes:      Pupils: Pupils are equal, round, and reactive to light  Cardiovascular:      Rate and Rhythm: Normal rate and regular rhythm  Heart sounds: Normal heart sounds  No murmur heard  Pulmonary:      Effort: Pulmonary effort is normal       Breath sounds: Normal breath sounds  No stridor  No rales  Abdominal:      General: Bowel sounds are normal  There is no distension  Palpations: Abdomen is soft  Tenderness: There is no abdominal tenderness  Musculoskeletal:         General: No deformity  Normal range of motion  Cervical back: Normal range of motion and neck supple  Skin:     General: Skin is warm and dry  Neurological:      Mental Status: He is alert and oriented to person, place, and time            PHQ-9 Depression Screening    PHQ-9:   Frequency of the following problems over the past two weeks:      Little interest or pleasure in doing things: 0 - not at all  Feeling down, depressed, or hopeless: 0 - not at all  PHQ-2 Score: 0

## 2021-09-07 ENCOUNTER — OFFICE VISIT (OUTPATIENT)
Dept: RHEUMATOLOGY | Facility: CLINIC | Age: 66
End: 2021-09-07
Payer: COMMERCIAL

## 2021-09-07 VITALS
SYSTOLIC BLOOD PRESSURE: 140 MMHG | DIASTOLIC BLOOD PRESSURE: 72 MMHG | HEIGHT: 70 IN | BODY MASS INDEX: 28.86 KG/M2 | WEIGHT: 201.6 LBS

## 2021-09-07 DIAGNOSIS — M19.90 OSTEOARTHRITIS, UNSPECIFIED OSTEOARTHRITIS TYPE, UNSPECIFIED SITE: ICD-10-CM

## 2021-09-07 DIAGNOSIS — M25.50 POLYARTHRALGIA: ICD-10-CM

## 2021-09-07 DIAGNOSIS — M11.20 PSEUDOGOUT: Primary | ICD-10-CM

## 2021-09-07 DIAGNOSIS — Z79.899 HIGH RISK MEDICATION USE: ICD-10-CM

## 2021-09-07 PROCEDURE — 99214 OFFICE O/P EST MOD 30 MIN: CPT | Performed by: INTERNAL MEDICINE

## 2021-09-07 RX ORDER — HYDROXYCHLOROQUINE SULFATE 200 MG/1
200 TABLET, FILM COATED ORAL 2 TIMES DAILY
Qty: 60 TABLET | Refills: 5 | Status: SHIPPED | OUTPATIENT
Start: 2021-09-07 | End: 2022-02-01

## 2021-09-28 DIAGNOSIS — N52.31 ERECTILE DYSFUNCTION AFTER RADICAL PROSTATECTOMY: Primary | ICD-10-CM

## 2021-09-28 NOTE — TELEPHONE ENCOUNTER
Pt would like his Sildenafil refilled  This medication is no longer in his medication profile  He would like a call back tomorrow to discuss renewing it

## 2021-09-29 RX ORDER — SILDENAFIL 100 MG/1
100 TABLET, FILM COATED ORAL AS NEEDED
Qty: 10 TABLET | Refills: 1 | Status: SHIPPED | OUTPATIENT
Start: 2021-09-29 | End: 2021-12-28 | Stop reason: SDUPTHER

## 2021-09-29 NOTE — TELEPHONE ENCOUNTER
Returned call to patient and made him aware that sildenafil was sent to [facility] mail order pharmacy  He verbalized understanding

## 2021-12-17 ENCOUNTER — APPOINTMENT (OUTPATIENT)
Dept: LAB | Facility: CLINIC | Age: 66
End: 2021-12-17
Payer: COMMERCIAL

## 2021-12-17 DIAGNOSIS — C61 PROSTATE CANCER (HCC): ICD-10-CM

## 2021-12-17 DIAGNOSIS — M25.50 POLYARTHRALGIA: ICD-10-CM

## 2021-12-17 LAB
ANION GAP SERPL CALCULATED.3IONS-SCNC: 5 MMOL/L (ref 4–13)
BUN SERPL-MCNC: 18 MG/DL (ref 5–25)
CALCIUM SERPL-MCNC: 9.8 MG/DL (ref 8.3–10.1)
CHLORIDE SERPL-SCNC: 104 MMOL/L (ref 100–108)
CO2 SERPL-SCNC: 29 MMOL/L (ref 21–32)
CREAT SERPL-MCNC: 1.08 MG/DL (ref 0.6–1.3)
CRP SERPL QL: <3 MG/L
GFR SERPL CREATININE-BSD FRML MDRD: 72 ML/MIN/1.73SQ M
GLUCOSE P FAST SERPL-MCNC: 96 MG/DL (ref 65–99)
POTASSIUM SERPL-SCNC: 4.3 MMOL/L (ref 3.5–5.3)
SODIUM SERPL-SCNC: 138 MMOL/L (ref 136–145)

## 2021-12-17 PROCEDURE — 36415 COLL VENOUS BLD VENIPUNCTURE: CPT

## 2021-12-17 PROCEDURE — 84153 ASSAY OF PSA TOTAL: CPT

## 2021-12-17 PROCEDURE — 86140 C-REACTIVE PROTEIN: CPT

## 2021-12-17 PROCEDURE — 86430 RHEUMATOID FACTOR TEST QUAL: CPT

## 2021-12-17 PROCEDURE — 86200 CCP ANTIBODY: CPT

## 2021-12-17 PROCEDURE — 80048 BASIC METABOLIC PNL TOTAL CA: CPT

## 2021-12-17 PROCEDURE — 86038 ANTINUCLEAR ANTIBODIES: CPT

## 2021-12-19 LAB — PSA SERPL DL<=0.01 NG/ML-MCNC: 0.04 NG/ML (ref 0–4)

## 2021-12-20 LAB
RHEUMATOID FACT SER QL LA: NEGATIVE
RYE IGE QN: NEGATIVE

## 2021-12-20 PROCEDURE — 88342 IMHCHEM/IMCYTCHM 1ST ANTB: CPT | Performed by: STUDENT IN AN ORGANIZED HEALTH CARE EDUCATION/TRAINING PROGRAM

## 2021-12-20 PROCEDURE — 88305 TISSUE EXAM BY PATHOLOGIST: CPT | Performed by: STUDENT IN AN ORGANIZED HEALTH CARE EDUCATION/TRAINING PROGRAM

## 2021-12-20 PROCEDURE — 88341 IMHCHEM/IMCYTCHM EA ADD ANTB: CPT | Performed by: STUDENT IN AN ORGANIZED HEALTH CARE EDUCATION/TRAINING PROGRAM

## 2021-12-21 ENCOUNTER — LAB REQUISITION (OUTPATIENT)
Dept: LAB | Facility: HOSPITAL | Age: 66
End: 2021-12-21
Payer: COMMERCIAL

## 2021-12-21 DIAGNOSIS — D22.5 MELANOCYTIC NEVI OF TRUNK: ICD-10-CM

## 2021-12-21 DIAGNOSIS — D48.5 NEOPLASM OF UNCERTAIN BEHAVIOR OF SKIN: ICD-10-CM

## 2021-12-21 LAB — CCP AB SER IA-ACNC: 2.3

## 2021-12-28 ENCOUNTER — OFFICE VISIT (OUTPATIENT)
Dept: UROLOGY | Facility: CLINIC | Age: 66
End: 2021-12-28
Payer: COMMERCIAL

## 2021-12-28 VITALS
WEIGHT: 201 LBS | SYSTOLIC BLOOD PRESSURE: 142 MMHG | HEART RATE: 70 BPM | BODY MASS INDEX: 28.84 KG/M2 | DIASTOLIC BLOOD PRESSURE: 88 MMHG

## 2021-12-28 DIAGNOSIS — N52.31 ERECTILE DYSFUNCTION AFTER RADICAL PROSTATECTOMY: ICD-10-CM

## 2021-12-28 DIAGNOSIS — C61 PROSTATE CANCER (HCC): Primary | ICD-10-CM

## 2021-12-28 PROCEDURE — 99214 OFFICE O/P EST MOD 30 MIN: CPT | Performed by: PHYSICIAN ASSISTANT

## 2021-12-28 RX ORDER — SILDENAFIL 100 MG/1
100 TABLET, FILM COATED ORAL AS NEEDED
Qty: 10 TABLET | Refills: 3 | Status: SHIPPED | OUTPATIENT
Start: 2021-12-28 | End: 2022-06-28 | Stop reason: ALTCHOICE

## 2022-01-03 PROCEDURE — 88305 TISSUE EXAM BY PATHOLOGIST: CPT | Performed by: STUDENT IN AN ORGANIZED HEALTH CARE EDUCATION/TRAINING PROGRAM

## 2022-01-04 ENCOUNTER — LAB REQUISITION (OUTPATIENT)
Dept: LAB | Facility: HOSPITAL | Age: 67
End: 2022-01-04
Payer: COMMERCIAL

## 2022-01-04 DIAGNOSIS — D48.5 NEOPLASM OF UNCERTAIN BEHAVIOR OF SKIN: ICD-10-CM

## 2022-02-01 ENCOUNTER — OFFICE VISIT (OUTPATIENT)
Dept: INTERNAL MEDICINE CLINIC | Facility: CLINIC | Age: 67
End: 2022-02-01
Payer: COMMERCIAL

## 2022-02-01 VITALS
RESPIRATION RATE: 13 BRPM | TEMPERATURE: 98.5 F | HEART RATE: 57 BPM | SYSTOLIC BLOOD PRESSURE: 132 MMHG | HEIGHT: 70 IN | WEIGHT: 201.2 LBS | BODY MASS INDEX: 28.8 KG/M2 | DIASTOLIC BLOOD PRESSURE: 76 MMHG | OXYGEN SATURATION: 98 %

## 2022-02-01 DIAGNOSIS — M19.049 LOCALIZED, PRIMARY OSTEOARTHRITIS OF HAND, UNSPECIFIED LATERALITY: ICD-10-CM

## 2022-02-01 DIAGNOSIS — E55.9 VITAMIN D DEFICIENCY: ICD-10-CM

## 2022-02-01 DIAGNOSIS — E78.00 HYPERCHOLESTEROLEMIA: Primary | ICD-10-CM

## 2022-02-01 DIAGNOSIS — Z13.0 SCREENING, ANEMIA, DEFICIENCY, IRON: ICD-10-CM

## 2022-02-01 PROCEDURE — 99214 OFFICE O/P EST MOD 30 MIN: CPT | Performed by: INTERNAL MEDICINE

## 2022-02-01 NOTE — PROGRESS NOTES
Assessment/Plan:    Problem List Items Addressed This Visit        Other    Hypercholesterolemia - Primary    Relevant Orders    Comprehensive metabolic panel    Lipid Panel with Direct LDL reflex      Other Visit Diagnoses     Vitamin D deficiency        follow up on vitamin D  Continue OTC D3 supplementation  Relevant Orders    Vitamin D 25 hydroxy    Localized, primary osteoarthritis of hand, unspecified laterality        Failure of NSAIDs  No improvement with evaluation by Rheum  Dr Cheryn Cogan for eval and xray of bilateral hands  Relevant Orders    Ambulatory Referral to Hand Surgery    XR hand 3+ vw right    XR hand 3+ vw left    Screening, anemia, deficiency, iron        Relevant Orders    D-dimer, quantitative           Diagnoses and all orders for this visit:    Hypercholesterolemia  Comments:  Reviewed lipid panel and discussed a low cholesterol diet  Orders:  -     Comprehensive metabolic panel; Future  -     Lipid Panel with Direct LDL reflex; Future    Vitamin D deficiency  Comments:  follow up on vitamin D  Continue OTC D3 supplementation  Orders:  -     Vitamin D 25 hydroxy; Future    Localized, primary osteoarthritis of hand, unspecified laterality  Comments:  Failure of NSAIDs  No improvement with evaluation by Rheum  Dr Cheryn Cogan for eval and xray of bilateral hands  Orders:  -     Ambulatory Referral to Hand Surgery; Future  -     XR hand 3+ vw right; Future  -     XR hand 3+ vw left; Future    Screening, anemia, deficiency, iron  -     D-dimer, quantitative; Future        No problem-specific Assessment & Plan notes found for this encounter  Subjective:      Patient ID: Ashley Hubbard is a 61 y o  male  The patient biopsy from derm were reviewed and several incidence of SCC were noted  Vitamin D was low and the patient is now supplementing  The patient BP is elevated and we will follow annually  The patient notes OA of his bilateral hands  Most notable at the left 1st MCP  He notes pain in the MCP, PIP and DIP of both hands  He was tried on several NSAIDs without relief  His tests for CTD were reviewed and noted to be negtive  The following portions of the patient's history were reviewed and updated as appropriate:   He has a past medical history of Arthritis, Bladder stone, Blue baby, Carpal tunnel syndrome, Chronic pain disorder, Generalized joint pain, GERD (gastroesophageal reflux disease), Herniated disc, cervical, History of kidney stones, [place] (hard of hearing), Hyperlipidemia, Moderate exercise, PONV (postoperative nausea and vomiting), Prostate cancer ([facility] [address] ), Rosacea, Skin cancer, and Tinnitus  ,  does not have any pertinent problems on file  ,   has a past surgical history that includes Neuroplasty / transposition median nerve at carpal tunnel bilateral; Skin lesion excision; Prostate biopsy; Carpal tunnel release (Bilateral); Hernia repair (Right); Back surgery; Tonsillectomy; Colonoscopy; pr lap,prostatectomy,radical,w/nerve spare,incl robotic (N/A, 9/4/2019); [place] retrograde pyelogram (9/4/2019); CT cystogram (9/18/2019); Prostatectomy; and Skin tag removal ,  family history includes Hypertension in his mother; Stroke in his father  ,   reports that he has never smoked  He has never used smokeless tobacco  He reports current alcohol use of about 1 0 standard drink of alcohol per week  He reports that he does not use drugs  ,  is allergic to ciprofloxacin and keflex [cephalexin]     Current Outpatient Medications   Medication Sig Dispense Refill    sildenafil (VIAGRA) 100 mg tablet Take 1 tablet (100 mg total) by mouth as needed for erectile dysfunction Take one tablet by mouth on empty stomach one hour prior to sex 10 tablet 3    VITAMIN D PO Take by mouth daily       No current facility-administered medications for this visit  Review of Systems   Constitutional: Negative for chills, fatigue and fever  HENT: Negative      Respiratory: Negative for cough, chest tightness and shortness of breath  Cardiovascular: Negative for chest pain and palpitations  Gastrointestinal: Negative for abdominal pain, constipation, diarrhea, nausea and vomiting  Genitourinary: Negative  Musculoskeletal: Positive for arthralgias and joint swelling  Negative for back pain and myalgias  Skin: Negative  Neurological: Negative  Psychiatric/Behavioral: Negative for dysphoric mood  The patient is not nervous/anxious  Objective:  Vitals:    02/01/22 0758   BP: 132/76   BP Location: Left arm   Patient Position: Sitting   Cuff Size: Standard   Pulse: 57   Resp: 13   Temp: 98 5 °F (36 9 °C)   SpO2: 98%   Weight: 91 3 kg (201 lb 3 2 oz)   Height: 5' 10" (1 778 m)     Body mass index is 28 87 kg/m²  Physical Exam  Vitals and nursing note reviewed  Constitutional:       Appearance: He is well-developed  HENT:      Head: Normocephalic and atraumatic  Eyes:      Pupils: Pupils are equal, round, and reactive to light  Cardiovascular:      Rate and Rhythm: Normal rate and regular rhythm  Heart sounds: Normal heart sounds  No murmur heard  Pulmonary:      Effort: Pulmonary effort is normal       Breath sounds: Normal breath sounds  No stridor  No rales  Abdominal:      General: Bowel sounds are normal  There is no distension  Palpations: Abdomen is soft  Tenderness: There is no abdominal tenderness  Musculoskeletal:         General: Tenderness present  Normal range of motion  Left hand: Swelling, deformity and bony tenderness present  Decreased strength  Cervical back: Normal range of motion and neck supple  Skin:     General: Skin is warm and dry  Neurological:      Mental Status: He is alert and oriented to person, place, and time            PHQ-2/9 Depression Screening    Little interest or pleasure in doing things: 0 - not at all  Feeling down, depressed, or hopeless: 0 - not at all  PHQ-2 Score: 0  PHQ-2 Interpretation: Negative depression screen

## 2022-02-15 PROCEDURE — 88305 TISSUE EXAM BY PATHOLOGIST: CPT | Performed by: PATHOLOGY

## 2022-02-16 ENCOUNTER — LAB REQUISITION (OUTPATIENT)
Dept: LAB | Facility: HOSPITAL | Age: 67
End: 2022-02-16
Payer: COMMERCIAL

## 2022-02-16 DIAGNOSIS — D04.5 CARCINOMA IN SITU OF SKIN OF TRUNK: ICD-10-CM

## 2022-03-14 PROCEDURE — 88305 TISSUE EXAM BY PATHOLOGIST: CPT | Performed by: STUDENT IN AN ORGANIZED HEALTH CARE EDUCATION/TRAINING PROGRAM

## 2022-03-15 ENCOUNTER — LAB REQUISITION (OUTPATIENT)
Dept: LAB | Facility: HOSPITAL | Age: 67
End: 2022-03-15
Payer: COMMERCIAL

## 2022-03-15 DIAGNOSIS — D22.5 MELANOCYTIC NEVI OF TRUNK: ICD-10-CM

## 2022-06-21 ENCOUNTER — APPOINTMENT (OUTPATIENT)
Dept: LAB | Facility: CLINIC | Age: 67
End: 2022-06-21
Payer: COMMERCIAL

## 2022-06-21 DIAGNOSIS — C61 PROSTATE CANCER (HCC): ICD-10-CM

## 2022-06-21 DIAGNOSIS — Z13.0 SCREENING, ANEMIA, DEFICIENCY, IRON: ICD-10-CM

## 2022-06-21 DIAGNOSIS — E78.00 HYPERCHOLESTEROLEMIA: ICD-10-CM

## 2022-06-21 DIAGNOSIS — E55.9 VITAMIN D DEFICIENCY: ICD-10-CM

## 2022-06-21 LAB
25(OH)D3 SERPL-MCNC: 32.6 NG/ML (ref 30–100)
ALBUMIN SERPL BCP-MCNC: 3.7 G/DL (ref 3.5–5)
ALP SERPL-CCNC: 53 U/L (ref 46–116)
ALT SERPL W P-5'-P-CCNC: 37 U/L (ref 12–78)
ANION GAP SERPL CALCULATED.3IONS-SCNC: 4 MMOL/L (ref 4–13)
AST SERPL W P-5'-P-CCNC: 23 U/L (ref 5–45)
BILIRUB SERPL-MCNC: 0.39 MG/DL (ref 0.2–1)
BUN SERPL-MCNC: 20 MG/DL (ref 5–25)
CALCIUM SERPL-MCNC: 8.9 MG/DL (ref 8.3–10.1)
CHLORIDE SERPL-SCNC: 106 MMOL/L (ref 100–108)
CHOLEST SERPL-MCNC: 263 MG/DL
CO2 SERPL-SCNC: 28 MMOL/L (ref 21–32)
CREAT SERPL-MCNC: 1.03 MG/DL (ref 0.6–1.3)
D DIMER PPP FEU-MCNC: <0.27 UG/ML FEU
GFR SERPL CREATININE-BSD FRML MDRD: 76 ML/MIN/1.73SQ M
GLUCOSE P FAST SERPL-MCNC: 93 MG/DL (ref 65–99)
HDLC SERPL-MCNC: 38 MG/DL
LDLC SERPL CALC-MCNC: 193 MG/DL (ref 0–100)
POTASSIUM SERPL-SCNC: 4.8 MMOL/L (ref 3.5–5.3)
PROT SERPL-MCNC: 7.5 G/DL (ref 6.4–8.2)
SODIUM SERPL-SCNC: 138 MMOL/L (ref 136–145)
TRIGL SERPL-MCNC: 162 MG/DL

## 2022-06-21 PROCEDURE — 82306 VITAMIN D 25 HYDROXY: CPT

## 2022-06-21 PROCEDURE — 36415 COLL VENOUS BLD VENIPUNCTURE: CPT

## 2022-06-21 PROCEDURE — 85379 FIBRIN DEGRADATION QUANT: CPT

## 2022-06-21 PROCEDURE — 80061 LIPID PANEL: CPT

## 2022-06-21 PROCEDURE — 84153 ASSAY OF PSA TOTAL: CPT

## 2022-06-21 PROCEDURE — 80053 COMPREHEN METABOLIC PANEL: CPT

## 2022-06-22 LAB — PSA SERPL DL<=0.01 NG/ML-MCNC: 0.1 NG/ML (ref 0–4)

## 2022-06-28 ENCOUNTER — OFFICE VISIT (OUTPATIENT)
Dept: UROLOGY | Facility: CLINIC | Age: 67
End: 2022-06-28
Payer: COMMERCIAL

## 2022-06-28 VITALS
HEIGHT: 70 IN | DIASTOLIC BLOOD PRESSURE: 80 MMHG | WEIGHT: 199.4 LBS | HEART RATE: 59 BPM | BODY MASS INDEX: 28.55 KG/M2 | OXYGEN SATURATION: 98 % | SYSTOLIC BLOOD PRESSURE: 132 MMHG

## 2022-06-28 DIAGNOSIS — C61 PROSTATE CANCER (HCC): Primary | ICD-10-CM

## 2022-06-28 DIAGNOSIS — N52.31 ERECTILE DYSFUNCTION AFTER RADICAL PROSTATECTOMY: ICD-10-CM

## 2022-06-28 LAB
SL AMB  POCT GLUCOSE, UA: ABNORMAL
SL AMB LEUKOCYTE ESTERASE,UA: ABNORMAL
SL AMB POCT BILIRUBIN,UA: ABNORMAL
SL AMB POCT BLOOD,UA: ABNORMAL
SL AMB POCT CLARITY,UA: CLEAR
SL AMB POCT COLOR,UA: YELLOW
SL AMB POCT KETONES,UA: ABNORMAL
SL AMB POCT NITRITE,UA: ABNORMAL
SL AMB POCT PH,UA: 7
SL AMB POCT SPECIFIC GRAVITY,UA: 1.01
SL AMB POCT URINE PROTEIN: ABNORMAL
SL AMB POCT UROBILINOGEN: 0.2

## 2022-06-28 PROCEDURE — 81002 URINALYSIS NONAUTO W/O SCOPE: CPT | Performed by: PHYSICIAN ASSISTANT

## 2022-06-28 PROCEDURE — 99214 OFFICE O/P EST MOD 30 MIN: CPT | Performed by: PHYSICIAN ASSISTANT

## 2022-06-28 RX ORDER — IBUPROFEN 200 MG
200 TABLET ORAL EVERY 6 HOURS PRN
COMMUNITY

## 2022-06-28 RX ORDER — TADALAFIL 20 MG/1
TABLET ORAL
Qty: 30 TABLET | Refills: 0 | Status: SHIPPED | OUTPATIENT
Start: 2022-06-28

## 2022-06-28 NOTE — PROGRESS NOTES
6/28/2022      Chief Complaint   Patient presents with    Prostate Cancer         Assessment and Plan    61 y o  male managed by Dr Aurora Carlos    1  pT3a Larry 3+4=7 Prostate Cancer  - diagnosis 2019  - pretreatment PSA 13  - s/p RALP-bPLND and right ureteral reimplant September 2019  - first postop PSA 0 026    Component      Latest Ref Rng & Units 10/18/2019 1/29/2020 4/21/2020 8/21/2020           8:33 AM  7:50 AM  7:58 AM  7:54 AM   PSA, Ultrasensitive      0 000 - 4 000 ng/mL 0 026 0 020 0 015 0 024     Component      Latest Ref Rng & Units 12/4/2020 6/16/2021 12/17/2021 6/21/2022           8:23 AM  7:46 AM  8:15 AM  7:52 AM   PSA, Ultrasensitive      0 000 - 4 000 ng/mL 0 031 0 043 0 043 0 105       Tayaesthela Parkas here with his wife today  We discussed the very slow rise in his PSA over the last three years post prostatectomy on ultrasensitive testing  Current PSA is 0 1  Physically he is feeling well and recovered  Discussed with Dr Aurora Carlos, patient, wife plan for continued monitoring of the PSA at this time with a next assessment in three months  Should we see additional rise/doubling during that time then a PSMA pet scan will be ordered to evaluate extent of local and/or distant disease, and a referral will be made to appropriate oncology teams as well as a urologist performing cryoablation for discussion of salvage treatment options available  In the interim he will try cialis 20mg on demand instead of the viagra  I gave him info to read on trimix he will let me know if wants to try that in the future, pending response to pde5i  Revisit in 3 months with psa  History of Present Illness  Cornelius Nair is a 61 y o  male here for evaluation of six-month follow-up prostate cancer  Patient with unfavorable intermediate prostate cancer treated with robotic prostatectomy and pelvic lymph node dissection with right ureteral reimplant September 2019    He has been followed with ultrasensitive PSA since   He has had a very slow rise but no indication as of yet for salvage therapy  He has no bothersome urinary symptoms, no hematuria  Has been using sildenafil 100 mg on demand in addition to a vacuum assist device with two silicone ring for erections  This is intermittently helpful but frustrating for him and his wife and difficulty with timing and spontaneity  He has not used tadalafil in the past and also may be interested in trimix intracavernosal injections  Small joint arthralgias ongoing (hands mostly) RA numbers slightly elevated seen rheumatology and pcp and using ibuprofen for this right now  Review of Systems   Constitutional: Negative for activity change, appetite change, chills, fever and unexpected weight change  HENT: Negative  Respiratory: Negative  Negative for shortness of breath  Cardiovascular: Negative  Negative for chest pain  Gastrointestinal: Negative for abdominal pain, diarrhea, nausea and vomiting  Endocrine: Negative  Genitourinary: Negative for decreased urine volume, difficulty urinating, dysuria, flank pain, frequency, hematuria, testicular pain and urgency  Musculoskeletal: Positive for arthralgias  Negative for back pain and gait problem  Skin: Negative  Allergic/Immunologic: Negative  Neurological: Negative  Hematological: Negative for adenopathy  Does not bruise/bleed easily  Vitals  Vitals:    06/28/22 0843   BP: 132/80   Pulse: 59   SpO2: 98%   Weight: 90 4 kg (199 lb 6 4 oz)   Height: 5' 10" (1 778 m)       Physical Exam  Vitals and nursing note reviewed  Constitutional:       General: He is not in acute distress  Appearance: Normal appearance  He is well-developed  He is not diaphoretic  HENT:      Head: Normocephalic and atraumatic  Pulmonary:      Effort: Pulmonary effort is normal       Comments: No cough or audible wheeze  Abdominal:      General: There is no distension  Tenderness:  There is no abdominal tenderness  There is no right CVA tenderness or left CVA tenderness  Hernia: No hernia is present  Musculoskeletal:      Right lower leg: No edema  Left lower leg: No edema  Skin:     General: Skin is warm and dry  Neurological:      Mental Status: He is alert and oriented to person, place, and time  Gait: Gait normal    Psychiatric:         Speech: Speech normal          Behavior: Behavior normal            Past History  Past Medical History:   Diagnosis Date    Arthritis     mostly hands    Bladder stone     Blue baby     at birth /with heart murmur/no longer noted    Carpal tunnel syndrome     Chronic pain disorder     "hands"    Generalized joint pain     GERD (gastroesophageal reflux disease)     "silent"    Herniated disc, cervical     some neck pain    History of kidney stones     [place] (hard of hearing)     "slightly"    Hyperlipidemia     Moderate exercise     "very active lifestyle/walking/at least 4 miles per day/active job"    PONV (postoperative nausea and vomiting)     "almost every time after surgery"    [facility]     Rosacea     Skin cancer     Tinnitus      Social History     Socioeconomic History    Marital status: /Civil Union     Spouse name: None    Number of children: None    Years of education: None    Highest education level: None   Occupational History    Occupation: RETIRED   Tobacco Use    Smoking status: Never Smoker    Smokeless tobacco: Never Used   Vaping Use    Vaping Use: Never used   Substance and Sexual Activity    Alcohol use:  Yes     Alcohol/week: 1 0 standard drink     Types: 1 Glasses of wine per week     Comment: occasional wine    Drug use: No    Sexual activity: Yes     Partners: Female   Other Topics Concern    None   Social History Narrative        RETIRED     Social Determinants of Health     Financial Resource Strain: Not on file   Food Insecurity: Not on file   Transportation Needs: Not on file   Physical Activity: Not on file   Stress: Not on file   Social Connections: Not on file   Intimate Partner Violence: Not on file   Housing Stability: Not on file     Social History     Tobacco Use   Smoking Status Never Smoker   Smokeless Tobacco Never Used     Family History   Problem Relation Age of Onset    Hypertension Mother     Stroke Father        The following portions of the patient's history were reviewed and updated as appropriate: allergies, current medications, past medical history, past social history, past surgical history and problem list     Results  No results found for this or any previous visit (from the past 1 hour(s)) ]  Lab Results   Component Value Date    PSA 13 4 (H) 04/26/2019    PSA 10 1 (H) 01/09/2019     Lab Results   Component Value Date    CALCIUM 8 9 06/21/2022    K 4 8 06/21/2022    CO2 28 06/21/2022     06/21/2022    BUN 20 06/21/2022    CREATININE 1 03 06/21/2022     Lab Results   Component Value Date    WBC 11 02 (H) 09/06/2019    HGB 11 5 (L) 09/06/2019    HCT 35 4 (L) 09/06/2019    MCV 94 09/06/2019     09/06/2019

## 2022-08-08 ENCOUNTER — TELEPHONE (OUTPATIENT)
Dept: INTERNAL MEDICINE CLINIC | Facility: CLINIC | Age: 67
End: 2022-08-08

## 2022-08-08 NOTE — TELEPHONE ENCOUNTER
Pt has appt with you tomorrow at 3:20pm  Pt wanted to cancel appt because he has already had a wellness this year  For his wife's insurance since she is a [employer] employee he needs to get his A1C checked  They would like to know if you will place the order for the A1C if they are not seen tomorrow  Please advise

## 2022-08-09 ENCOUNTER — OFFICE VISIT (OUTPATIENT)
Dept: INTERNAL MEDICINE CLINIC | Facility: CLINIC | Age: 67
End: 2022-08-09
Payer: COMMERCIAL

## 2022-08-09 VITALS
SYSTOLIC BLOOD PRESSURE: 130 MMHG | DIASTOLIC BLOOD PRESSURE: 70 MMHG | HEART RATE: 74 BPM | OXYGEN SATURATION: 97 % | WEIGHT: 203 LBS | HEIGHT: 70 IN | BODY MASS INDEX: 29.06 KG/M2 | TEMPERATURE: 98.5 F

## 2022-08-09 DIAGNOSIS — Z00.00 ANNUAL PHYSICAL EXAM: Primary | ICD-10-CM

## 2022-08-09 DIAGNOSIS — C61 PROSTATE CANCER (HCC): ICD-10-CM

## 2022-08-09 DIAGNOSIS — Z13.1 SCREENING FOR DIABETES MELLITUS: ICD-10-CM

## 2022-08-09 PROCEDURE — 99396 PREV VISIT EST AGE 40-64: CPT | Performed by: NURSE PRACTITIONER

## 2022-08-09 NOTE — TELEPHONE ENCOUNTER
I placed the order for the Hemoglobin A1c, and a CBC to meet the [facility] requirements  I doubled checked and he was not seen / it was not documented that he had a wellness visit this year  If he cancels for today, he will need to have it documented before the ? Sept 1st deadline? Or August 31st deadline? Let them know   TY

## 2022-08-09 NOTE — PATIENT INSTRUCTIONS
Problem List Items Addressed This Visit          Genitourinary    Prostate cancer [facility])     Follows with urology  Has labs ordered               Other    Annual physical exam - Primary     Lifestyle modification, diet and exercise discussed  Medications discussed and refilled appropriately  Labs discussed and ordered   Hepatitis C screening discussed  HIV screening discussed  Depression screening performed  Anxiety screening performed  BMI discussed  Fall screening performed           Screening for diabetes mellitus

## 2022-08-09 NOTE — PROGRESS NOTES
[facility]    NAME: Matthias Whitman  AGE: 61 y o  SEX: male  : 1955     DATE: 2022     Assessment and Plan:     Problem List Items Addressed This Visit        Genitourinary    Prostate cancer [facility])     Follows with urology  Has labs ordered             Other    Annual physical exam - Primary     Lifestyle modification, diet and exercise discussed  Medications discussed and refilled appropriately  Labs discussed and ordered   Hepatitis C screening discussed  HIV screening discussed  Depression screening performed  Anxiety screening performed  BMI discussed  Fall screening performed         Screening for diabetes mellitus          Immunizations and preventive care screenings were discussed with patient today  Appropriate education was printed on patient's after visit summary  Counseling:  Alcohol/drug use: discussed moderation in alcohol intake, the recommendations for healthy alcohol use, and avoidance of illicit drug use  Dental Health: discussed importance of regular tooth brushing, flossing, and dental visits  Injury prevention: discussed safety/seat belts, safety helmets, smoke detectors, carbon dioxide detectors, and smoking near bedding or upholstery  Sexual health: discussed sexually transmitted diseases, partner selection, use of condoms, avoidance of unintended pregnancy, and contraceptive alternatives  Exercise: the importance of regular exercise/physical activity was discussed  Recommend exercise 3-5 times per week for at least 30 minutes  BMI Counseling: Body mass index is 29 13 kg/m²   The BMI is above normal  Nutrition recommendations include decreasing portion sizes, encouraging healthy choices of fruits and vegetables, decreasing fast food intake, consuming healthier snacks, limiting drinks that contain sugar, moderation in carbohydrate intake, increasing intake of lean protein, reducing intake of saturated and trans fat and reducing intake of cholesterol  Exercise recommendations include exercising 3-5 times per week  No pharmacotherapy was ordered  Rationale for BMI follow-up plan is due to patient being overweight or obese  Depression Screening and Follow-up Plan: Patient was screened for depression during today's encounter  They screened negative with a PHQ-2 score of 0  Return in about 1 year (around 8/9/2023) for Annual physical      Chief Complaint:     Chief Complaint   Patient presents with    Annual Exam     Needs labs (caring starts with you) pt has no questions at this time  History of Present Illness:     Adult Annual Physical   Patient here for a comprehensive physical exam  The patient reports problems - as discussed   Diet and Physical Activity  Diet/Nutrition: well balanced diet  Exercise: 5-7 times a week on average  Depression Screening  PHQ-2/9 Depression Screening    Little interest or pleasure in doing things: 0 - not at all  Feeling down, depressed, or hopeless: 0 - not at all  PHQ-2 Score: 0  PHQ-2 Interpretation: Negative depression screen       General Health  Sleep: sleeps well  Hearing: normal - bilateral   Vision: no vision problems  Dental: regular dental visits   Health  Symptoms include: none     Review of Systems:     Review of Systems   Constitutional: Negative for activity change, chills, fatigue and fever  HENT: Negative for rhinorrhea and sore throat  Eyes: Negative for pain  Respiratory: Negative for cough and shortness of breath  Cardiovascular: Negative for chest pain, palpitations and leg swelling  Gastrointestinal: Negative for abdominal pain, constipation, diarrhea, nausea and vomiting  Genitourinary: Negative for difficulty urinating, flank pain, frequency and urgency  Musculoskeletal: Negative for gait problem, joint swelling and myalgias  Skin: Negative for color change     Neurological: Negative for dizziness, weakness, light-headedness and headaches  Psychiatric/Behavioral: Negative for sleep disturbance  The patient is not nervous/anxious  All other systems reviewed and are negative       Past Medical History:     Past Medical History:   Diagnosis Date    Arthritis     mostly hands    Bladder stone     Blue baby     at birth /with heart murmur/no longer noted    Carpal tunnel syndrome     Chronic pain disorder     "hands"    Generalized joint pain     GERD (gastroesophageal reflux disease)     "silent"    Herniated disc, cervical     some neck pain    History of kidney stones     [place] (hard of hearing)     "slightly"    Moderate exercise     "very active lifestyle/walking/at least 4 miles per day/active job"    PONV (postoperative nausea and vomiting)     "almost every time after surgery"    Prostate cancer ([address] )     Rosacea     Skin cancer     Tinnitus       Past Surgical History:     Past Surgical History:   Procedure Laterality Date    BACK SURGERY      HERNIATED DISC/Lumbar    CARPAL TUNNEL RELEASE Bilateral     COLONOSCOPY      CT CYSTOGRAM  9/18/2019    FL RETROGRADE PYELOGRAM  9/4/2019    HERNIA REPAIR Right     inguinal    NEUROPLASTY / TRANSPOSITION MEDIAN NERVE AT CARPAL TUNNEL BILATERAL      VA LAP,PROSTATECTOMY,RADICAL,W/NERVE SPARE,INCL ROBOTIC N/A 9/4/2019    Procedure: PROSTATECTOMY RADICAL W ROBOTICS; BILATERAL PELVIC LYMPH NODE DISSECTION LYSIS OF ADHESIONS Robotic; DOUBLE LEFT URETERAL STENT INSERTION with retrograde pyelogram, Instillation of ICG; left upper pole ureteral re-implantation Robotic;  Surgeon: An Jerry MD;  Location: [facility];  Service: Urology    PROSTATE BIOPSY      PROSTATECTOMY      SKIN LESION EXCISION      x2 on nose for melanoma    SKIN TAG REMOVAL      TONSILLECTOMY        Family History:     Family History   Problem Relation Age of Onset    Hypertension Mother     Stroke Father       Social History:     Social History Socioeconomic History    Marital status: /Civil Union     Spouse name: None    Number of children: None    Years of education: None    Highest education level: None   Occupational History    Occupation: RETIRED   Tobacco Use    Smoking status: Never Smoker    Smokeless tobacco: Never Used   Vaping Use    Vaping Use: Never used   Substance and Sexual Activity    Alcohol use: Yes     Alcohol/week: 1 0 standard drink     Types: 1 Glasses of wine per week     Comment: occasional wine    Drug use: No    Sexual activity: Yes     Partners: Female   Other Topics Concern    None   Social History Narrative        RETIRED     Social Determinants of Health     Financial Resource Strain: Not on file   Food Insecurity: Not on file   Transportation Needs: Not on file   Physical Activity: Not on file   Stress: Not on file   Social Connections: Not on file   Intimate Partner Violence: Not on file   Housing Stability: Not on file      Current Medications:     Current Outpatient Medications   Medication Sig Dispense Refill    ibuprofen (MOTRIN) 200 mg tablet Take 200 mg by mouth every 6 (six) hours as needed for mild pain For arthiritis/ pain      tadalafil (CIALIS) 20 MG tablet Take one pill by mouth one hour before sex 30 tablet 0    VITAMIN D PO Take by mouth daily (Patient not taking: No sig reported)       No current facility-administered medications for this visit  Allergies: Allergies   Allergen Reactions    Ciprofloxacin GI Intolerance    Keflex [Cephalexin] GI Intolerance      Physical Exam:     /70 (BP Location: Left arm, Patient Position: Sitting, Cuff Size: Standard)   Pulse 74   Temp 98 5 °F (36 9 °C)   Ht 5' 10" (1 778 m)   Wt 92 1 kg (203 lb)   SpO2 97%   BMI 29 13 kg/m²     Physical Exam  Vitals and nursing note reviewed  Constitutional:       General: He is awake  Appearance: Normal appearance  He is well-developed and normal weight     HENT:      Head: Normocephalic and atraumatic  Nose: Nose normal       Mouth/Throat:      Mouth: Mucous membranes are moist    Eyes:      Conjunctiva/sclera: Conjunctivae normal    Cardiovascular:      Rate and Rhythm: Normal rate and regular rhythm  Pulses: Normal pulses  Heart sounds: Normal heart sounds  No murmur heard  Pulmonary:      Effort: Pulmonary effort is normal  No respiratory distress  Breath sounds: Normal breath sounds  Abdominal:      General: Bowel sounds are normal       Palpations: Abdomen is soft  Tenderness: There is no abdominal tenderness  Musculoskeletal:      Cervical back: Neck supple  Right lower leg: No edema  Left lower leg: No edema  Skin:     General: Skin is warm and dry  Neurological:      Mental Status: He is alert and oriented to person, place, and time  Psychiatric:         Attention and Perception: Attention normal          Mood and Affect: Mood normal          Speech: Speech normal          Behavior: Behavior normal  Behavior is cooperative            Sahara Weathers, [facility]
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
## 2022-08-09 NOTE — ASSESSMENT & PLAN NOTE
 Lifestyle modification, diet and exercise discussed   Medications discussed and refilled appropriately   Labs discussed and ordered    Hepatitis C screening discussed   HIV screening discussed   Depression screening performed   Anxiety screening performed   BMI discussed   Fall screening performed

## 2022-08-15 ENCOUNTER — APPOINTMENT (OUTPATIENT)
Dept: LAB | Facility: CLINIC | Age: 67
End: 2022-08-15
Payer: COMMERCIAL

## 2022-08-15 DIAGNOSIS — Z00.00 ANNUAL PHYSICAL EXAM: ICD-10-CM

## 2022-08-15 DIAGNOSIS — Z13.1 SCREENING FOR DIABETES MELLITUS: ICD-10-CM

## 2022-08-15 LAB
BASOPHILS # BLD AUTO: 0.08 THOUSANDS/ΜL (ref 0–0.1)
BASOPHILS NFR BLD AUTO: 1 % (ref 0–1)
EOSINOPHIL # BLD AUTO: 0.34 THOUSAND/ΜL (ref 0–0.61)
EOSINOPHIL NFR BLD AUTO: 6 % (ref 0–6)
ERYTHROCYTE [DISTWIDTH] IN BLOOD BY AUTOMATED COUNT: 13.6 % (ref 11.6–15.1)
EST. AVERAGE GLUCOSE BLD GHB EST-MCNC: 123 MG/DL
HBA1C MFR BLD: 5.9 %
HCT VFR BLD AUTO: 44.7 % (ref 36.5–49.3)
HGB BLD-MCNC: 14.7 G/DL (ref 12–17)
IMM GRANULOCYTES # BLD AUTO: 0.02 THOUSAND/UL (ref 0–0.2)
IMM GRANULOCYTES NFR BLD AUTO: 0 % (ref 0–2)
LYMPHOCYTES # BLD AUTO: 2.66 THOUSANDS/ΜL (ref 0.6–4.47)
LYMPHOCYTES NFR BLD AUTO: 43 % (ref 14–44)
MCH RBC QN AUTO: 30.2 PG (ref 26.8–34.3)
MCHC RBC AUTO-ENTMCNC: 32.9 G/DL (ref 31.4–37.4)
MCV RBC AUTO: 92 FL (ref 82–98)
MONOCYTES # BLD AUTO: 0.61 THOUSAND/ΜL (ref 0.17–1.22)
MONOCYTES NFR BLD AUTO: 10 % (ref 4–12)
NEUTROPHILS # BLD AUTO: 2.47 THOUSANDS/ΜL (ref 1.85–7.62)
NEUTS SEG NFR BLD AUTO: 40 % (ref 43–75)
NRBC BLD AUTO-RTO: 0 /100 WBCS
PLATELET # BLD AUTO: 237 THOUSANDS/UL (ref 149–390)
PMV BLD AUTO: 10 FL (ref 8.9–12.7)
RBC # BLD AUTO: 4.87 MILLION/UL (ref 3.88–5.62)
WBC # BLD AUTO: 6.18 THOUSAND/UL (ref 4.31–10.16)

## 2022-08-15 PROCEDURE — 85025 COMPLETE CBC W/AUTO DIFF WBC: CPT

## 2022-08-15 PROCEDURE — 83036 HEMOGLOBIN GLYCOSYLATED A1C: CPT

## 2022-08-15 PROCEDURE — 36415 COLL VENOUS BLD VENIPUNCTURE: CPT

## 2022-09-19 ENCOUNTER — APPOINTMENT (OUTPATIENT)
Dept: LAB | Facility: CLINIC | Age: 67
End: 2022-09-19
Payer: COMMERCIAL

## 2022-09-19 DIAGNOSIS — C61 PROSTATE CANCER (HCC): ICD-10-CM

## 2022-09-19 PROCEDURE — 84153 ASSAY OF PSA TOTAL: CPT

## 2022-09-19 PROCEDURE — 36415 COLL VENOUS BLD VENIPUNCTURE: CPT

## 2022-09-20 LAB — PSA SERPL DL<=0.01 NG/ML-MCNC: 0.1 NG/ML (ref 0–4)

## 2022-09-27 ENCOUNTER — TELEMEDICINE (OUTPATIENT)
Dept: UROLOGY | Facility: CLINIC | Age: 67
End: 2022-09-27
Payer: COMMERCIAL

## 2022-09-27 VITALS — BODY MASS INDEX: 28.63 KG/M2 | WEIGHT: 200 LBS | HEIGHT: 70 IN

## 2022-09-27 DIAGNOSIS — N52.31 ERECTILE DYSFUNCTION AFTER RADICAL PROSTATECTOMY: ICD-10-CM

## 2022-09-27 DIAGNOSIS — C61 PROSTATE CANCER (HCC): Primary | ICD-10-CM

## 2022-09-27 PROCEDURE — 99213 OFFICE O/P EST LOW 20 MIN: CPT | Performed by: PHYSICIAN ASSISTANT

## 2022-09-27 RX ORDER — SILDENAFIL 100 MG/1
TABLET, FILM COATED ORAL
Qty: 30 TABLET | Refills: 1 | Status: SHIPPED | OUTPATIENT
Start: 2022-09-27

## 2022-09-27 NOTE — PROGRESS NOTES
Virtual Brief Visit    Patient is located in the following state in which I hold an active license [place]      Assessment/Plan:    9/27/2022      Chief Complaint   Patient presents with    Prostate Cancer         Assessment and Plan    74 y o  male managed by Dr Megha Walsh    1  pT3a Larry 3+4=7 prostate cancer  - diagnosis 2019  - pretreatment psa 13  - staging imaging  - s/p RALP-bPLND and right ureteral reimplant September 2019  - first postop PSA 0 026  - current PSA 0 095    PSA, Ultrasensitive 0 000 - 4 000 ng/mL 0 095  0 105 CM  0 043 CM  0 043 CM  0 031 CM  0 024 CM  0 015 CM      2  Postprostatectomy erectile dysfunction  - sildenafil worked better, rx 100mg prn refilled    Jonel Callaway is in good spirits, no voiding complaints, good continence  Felt the sildenafil worked better than the tadalafil, switched his prescription back  In terms of his prostate cancer PSA stable to last check  Overall PSA remains under 0 1, will continue q three-month checks to establish his doubling time  Will hold off on reimaging or salvage local surgical or radiation therapies at this time though he and his wife were aware this may be a possibility in the future if the PSA begins to rise rapidly again  History of Present Illness  Blake Conley is a 74 y o  male here for evaluation of prostate cancer 3 month visit  No voiding complaints  No hematuria  Rare incontinence few drips if lifts something heavy in garage/yard  No daily/night leaks  Has brothers with prostate cancer and another being worked up currently  Review of Systems   Constitutional: Negative for activity change, appetite change, chills, fever and unexpected weight change  HENT: Negative  Respiratory: Negative  Negative for shortness of breath  Cardiovascular: Negative  Negative for chest pain  Gastrointestinal: Negative for abdominal pain, diarrhea, nausea and vomiting  Endocrine: Negative      Genitourinary: Negative for decreased urine volume, difficulty urinating, dysuria, flank pain, frequency, hematuria, testicular pain and urgency  Musculoskeletal: Negative for back pain and gait problem  Skin: Negative  Allergic/Immunologic: Negative  Neurological: Negative  Hematological: Negative for adenopathy  Does not bruise/bleed easily  AUA SYMPTOM SCORE    Flowsheet Row Most Recent Value   AUA SYMPTOM SCORE    How often have you had a sensation of not emptying your bladder completely after you finished urinating? 1 (P)     How often have you had to urinate again less than two hours after you finished urinating? 0 (P)     How often have you found you stopped and started again several times when you urinate? 0 (P)     How often have you found it difficult to postpone urination? 0 (P)     How often have you had a weak urinary stream? 0 (P)     How often have you had to push or strain to begin urination? 0 (P)     How many times did you most typically get up to urinate from the time you went to bed at night until the time you got up in the morning? 1 (P)     Quality of Life: If you were to spend the rest of your life with your urinary condition just the way it is now, how would you feel about that? 1 (P)     AUA SYMPTOM SCORE 2 (P)             Problem List Items Addressed This Visit        Genitourinary    Prostate cancer ([facility] ) - Primary    Relevant Orders    PSA, ultrasensitive       Other    Erectile dysfunction    Relevant Medications    sildenafil (VIAGRA) 100 mg tablet          Recent Visits  No visits were found meeting these conditions  Showing recent visits within past 7 days and meeting all other requirements  Today's Visits  Date Type Provider Dept   09/27/22 Telemedicine Mike Alexandre PA-C [facility]   Showing today's visits and meeting all other requirements  Future Appointments  No visits were found meeting these conditions    Showing future appointments within next 150 days and meeting all other requirements         I spent 10 minutes directly with the patient during this visit

## 2022-10-11 PROBLEM — Z13.1 SCREENING FOR DIABETES MELLITUS: Status: RESOLVED | Noted: 2022-08-09 | Resolved: 2022-10-11

## 2022-12-14 ENCOUNTER — APPOINTMENT (OUTPATIENT)
Dept: LAB | Facility: CLINIC | Age: 67
End: 2022-12-14

## 2022-12-14 DIAGNOSIS — C61 PROSTATE CANCER (HCC): ICD-10-CM

## 2022-12-15 LAB — PSA SERPL DL<=0.01 NG/ML-MCNC: 0.09 NG/ML (ref 0–4)

## 2022-12-20 ENCOUNTER — TELEMEDICINE (OUTPATIENT)
Dept: UROLOGY | Facility: CLINIC | Age: 67
End: 2022-12-20

## 2022-12-20 DIAGNOSIS — C61 PROSTATE CANCER (HCC): Primary | ICD-10-CM

## 2022-12-20 NOTE — PROGRESS NOTES
Virtual Brief Visit    Patient is located in the following state in which I hold an active license [place]      Assessment/Plan:  pT3a Larry 3+4=7 Prostate Cancer  RALP-bPLND September 2019 with right ureteral reimplant  First postop PSA 0 026, peak 0 105  Ultrasensitive PSA 0 085 stabilized  Will followup in six months with next PSA  Taking into account early doubling time will continue close followups and monitor PSA trend/trajectory consider PSMA pet reimaging for future significant rise  2  Postprostatectomy ED  - continue sildenafil and VAD    3  Postprostatectomy SHAYNA  - mild with sneeze, lifting; 0-1 pads per day      12/14/22  8:33 AM 9/19/22  7:56 AM 6/21/22  7:52 AM 12/17/21  8:15 AM 6/16/21  7:46 AM 12/4/20  8:23 AM 8/21/20  7:54 AM     PSA, Ultrasensitive 0 000 - 4 000 ng/mL 0 085  0 095 CM  0 105 CM  0 043 CM  0 043 CM  0 031 CM  0 024 CM        Problem List Items Addressed This Visit        Genitourinary    Prostate cancer [facility]) - Primary    Relevant Orders    PSA Total, Diagnostic       Recent Visits  No visits were found meeting these conditions  Showing recent visits within past 7 days and meeting all other requirements  Today's Visits  Date Type Provider Dept   12/20/22 Telemedicine Ashwini Faulkner PA-C [facility]   Showing today's visits and meeting all other requirements  Future Appointments  No visits were found meeting these conditions    Showing future appointments within next 150 days and meeting all other requirements         Visit Time    Visit Start Time: 8:42  Visit Stop Time: 8:51  Total Visit Duration: 12 minutes including chart review and "face to face" time minutes

## 2023-06-21 ENCOUNTER — APPOINTMENT (OUTPATIENT)
Dept: LAB | Facility: CLINIC | Age: 68
End: 2023-06-21
Payer: COMMERCIAL

## 2023-06-21 DIAGNOSIS — C61 PROSTATE CANCER (HCC): ICD-10-CM

## 2023-06-21 LAB — PSA SERPL-MCNC: 0.09 NG/ML (ref 0–4)

## 2023-06-21 PROCEDURE — 84153 ASSAY OF PSA TOTAL: CPT

## 2023-06-21 PROCEDURE — 36415 COLL VENOUS BLD VENIPUNCTURE: CPT

## 2023-11-20 ENCOUNTER — OFFICE VISIT (OUTPATIENT)
Dept: INTERNAL MEDICINE CLINIC | Facility: CLINIC | Age: 68
End: 2023-11-20
Payer: COMMERCIAL

## 2023-11-20 VITALS
DIASTOLIC BLOOD PRESSURE: 72 MMHG | BODY MASS INDEX: 29.41 KG/M2 | WEIGHT: 205.4 LBS | SYSTOLIC BLOOD PRESSURE: 142 MMHG | OXYGEN SATURATION: 96 % | TEMPERATURE: 98.3 F | HEIGHT: 70 IN | HEART RATE: 96 BPM

## 2023-11-20 DIAGNOSIS — C61 PROSTATE CANCER (HCC): ICD-10-CM

## 2023-11-20 DIAGNOSIS — E55.9 VITAMIN D DEFICIENCY: ICD-10-CM

## 2023-11-20 DIAGNOSIS — M19.049 HAND ARTHRITIS: ICD-10-CM

## 2023-11-20 DIAGNOSIS — Z13.1 SCREENING FOR DIABETES MELLITUS: ICD-10-CM

## 2023-11-20 DIAGNOSIS — Z23 ENCOUNTER FOR IMMUNIZATION: ICD-10-CM

## 2023-11-20 DIAGNOSIS — K21.9 GASTROESOPHAGEAL REFLUX DISEASE WITHOUT ESOPHAGITIS: ICD-10-CM

## 2023-11-20 DIAGNOSIS — Z00.00 ANNUAL PHYSICAL EXAM: Primary | ICD-10-CM

## 2023-11-20 DIAGNOSIS — E78.00 HYPERCHOLESTEROLEMIA: ICD-10-CM

## 2023-11-20 PROBLEM — Q62.5 DUPLICATED LEFT RENAL COLLECTING SYSTEM: Status: RESOLVED | Noted: 2019-09-04 | Resolved: 2023-11-20

## 2023-11-20 PROBLEM — Q62.63: Status: RESOLVED | Noted: 2019-09-04 | Resolved: 2023-11-20

## 2023-11-20 PROCEDURE — 90471 IMMUNIZATION ADMIN: CPT

## 2023-11-20 PROCEDURE — 90715 TDAP VACCINE 7 YRS/> IM: CPT

## 2023-11-20 PROCEDURE — 99397 PER PM REEVAL EST PAT 65+ YR: CPT | Performed by: NURSE PRACTITIONER

## 2023-11-20 RX ORDER — OMEPRAZOLE 20 MG/1
20 CAPSULE, DELAYED RELEASE ORAL 2 TIMES DAILY
Qty: 90 CAPSULE | Refills: 3 | Status: SHIPPED | OUTPATIENT
Start: 2023-11-20 | End: 2024-11-14

## 2023-11-20 RX ORDER — TRAMADOL HYDROCHLORIDE 50 MG/1
50 TABLET ORAL
Qty: 90 TABLET | Refills: 1 | Status: SHIPPED | OUTPATIENT
Start: 2023-11-20

## 2023-11-20 RX ORDER — GABAPENTIN 100 MG/1
100 CAPSULE ORAL
Qty: 90 CAPSULE | Refills: 1 | Status: SHIPPED | OUTPATIENT
Start: 2023-11-20

## 2023-11-20 NOTE — PATIENT INSTRUCTIONS
Wellness Visit for Adults   AMBULATORY CARE:   A wellness visit  is when you see your healthcare provider to get screened for health problems. Your healthcare provider will also give you advice on how to stay healthy. Write down your questions so you remember to ask them. Ask your healthcare provider how often you should have a wellness visit. What happens at a wellness visit:  Your healthcare provider will ask about your health, and your family history of health problems. This includes high blood pressure, heart disease, and cancer. He or she will ask if you have symptoms that concern you, if you smoke, and about your mood. You may also be asked about your intake of medicines, supplements, food, and alcohol. Any of the following may be done: Your weight  will be checked. Your height may also be checked so your body mass index (BMI) can be calculated. Your BMI shows if you are at a healthy weight. Your blood pressure  and heart rate will be checked. Your temperature may also be checked. Blood and urine tests  may be done. Blood tests may be done to check your cholesterol levels. Abnormal cholesterol levels increase your risk for heart disease and stroke. You may also need a blood or urine test to check for diabetes if you are at increased risk. Urine tests may be done to look for signs of an infection or kidney disease. A physical exam  includes checking your heartbeat and lungs with a stethoscope. Your healthcare provider may also check your skin to look for sun damage. Screening tests  may be recommended. A screening test is done to check for diseases that may not cause symptoms. The screening tests you may need depend on your age, gender, family history, and lifestyle habits. For example, colorectal screening may be recommended if you are 48years old or older. Screening tests you need if you are a woman:   A Pap smear  is used to screen for cervical cancer.  Pap smears are usually done every 3 to 5 years depending on your age. You may need them more often if you have had abnormal Pap smear test results in the past. Ask your healthcare provider how often you should have a Pap smear. A mammogram  is an x-ray of your breasts to screen for breast cancer. Experts recommend mammograms every 2 years starting at age 48 years. You may need a mammogram at age 52 years or younger if you have an increased risk for breast cancer. Talk to your healthcare provider about when you should start having mammograms and how often you need them. Vaccines you may need:   Get an influenza vaccine  every year. The influenza vaccine protects you from the flu. Several types of viruses cause the flu. The viruses change over time, so new vaccines are made each year. Get a tetanus-diphtheria (Td) booster vaccine  every 10 years. This vaccine protects you against tetanus and diphtheria. Tetanus is a severe infection that may cause painful muscle spasms and lockjaw. Diphtheria is a severe bacterial infection that causes a thick covering in the back of your mouth and throat. Get a human papillomavirus (HPV) vaccine  if you are female and aged 23 to 32 or male 23 to 24 and never received it. This vaccine protects you from HPV infection. HPV is the most common infection spread by sexual contact. HPV may also cause vaginal, penile, and anal cancers. Get a pneumococcal vaccine  if you are aged 72 years or older. The pneumococcal vaccine is an injection given to protect you from pneumococcal disease. Pneumococcal disease is an infection caused by pneumococcal bacteria. The infection may cause pneumonia, meningitis, or an ear infection. Get a shingles vaccine  if you are 60 or older, even if you have had shingles before. The shingles vaccine is an injection to protect you from the varicella-zoster virus. This is the same virus that causes chickenpox.  Shingles is a painful rash that develops in people who had chickenpox or have been exposed to the virus. How to eat healthy:  My Plate is a model for planning healthy meals. It shows the types and amounts of foods that should go on your plate. Fruits and vegetables make up about half of your plate, and grains and protein make up the other half. A serving of dairy is included on the side of your plate. The amount of calories and serving sizes you need depends on your age, gender, weight, and height. Examples of healthy foods are listed below:  Eat a variety of vegetables  such as dark green, red, and orange vegetables. You can also include canned vegetables low in sodium (salt) and frozen vegetables without added butter or sauces. Eat a variety of fresh fruits , canned fruit in 100% juice, frozen fruit, and dried fruit. Include whole grains. At least half of the grains you eat should be whole grains. Examples include whole-wheat bread, wheat pasta, brown rice, and whole-grain cereals such as oatmeal.    Eat a variety of protein foods such as seafood (fish and shellfish), lean meat, and poultry without skin (turkey and chicken). Examples of lean meats include pork leg, shoulder, or tenderloin, and beef round, sirloin, tenderloin, and extra lean ground beef. Other protein foods include eggs and egg substitutes, beans, peas, soy products, nuts, and seeds. Choose low-fat dairy products such as skim or 1% milk or low-fat yogurt, cheese, and cottage cheese. Limit unhealthy fats  such as butter, hard margarine, and shortening. Exercise:  Exercise at least 30 minutes per day on most days of the week. Some examples of exercise include walking, biking, dancing, and swimming. You can also fit in more physical activity by taking the stairs instead of the elevator or parking farther away from stores. Include muscle strengthening activities 2 days each week. Regular exercise provides many health benefits.  It helps you manage your weight, and decreases your risk for type 2 diabetes, heart disease, stroke, and high blood pressure. Exercise can also help improve your mood. Ask your healthcare provider about the best exercise plan for you. General health and safety guidelines:   Do not smoke. Nicotine and other chemicals in cigarettes and cigars can cause lung damage. Ask your healthcare provider for information if you currently smoke and need help to quit. E-cigarettes or smokeless tobacco still contain nicotine. Talk to your healthcare provider before you use these products. Limit alcohol. A drink of alcohol is 12 ounces of beer, 5 ounces of wine, or 1½ ounces of liquor. Lose weight, if needed. Being overweight increases your risk of certain health conditions. These include heart disease, high blood pressure, type 2 diabetes, and certain types of cancer. Protect your skin. Do not sunbathe or use tanning beds. Use sunscreen with a SPF 15 or higher. Apply sunscreen at least 15 minutes before you go outside. Reapply sunscreen every 2 hours. Wear protective clothing, hats, and sunglasses when you are outside. Drive safely. Always wear your seatbelt. Make sure everyone in your car wears a seatbelt. A seatbelt can save your life if you are in an accident. Do not use your cell phone when you are driving. This could distract you and cause an accident. Pull over if you need to make a call or send a text message. Practice safe sex. Use latex condoms if are sexually active and have more than one partner. Your healthcare provider may recommend screening tests for sexually transmitted infections (STIs). Wear helmets, lifejackets, and protective gear. Always wear a helmet when you ride a bike or motorcycle, go skiing, or play sports that could cause a head injury. Wear protective equipment when you play sports. Wear a lifejacket when you are on a boat or doing water sports.     © Copyright [employer] 2023 Information is for End User's use only and may not be sold, redistributed or otherwise used for commercial purposes. The above information is an  only. It is not intended as medical advice for individual conditions or treatments. Talk to your doctor, nurse or pharmacist before following any medical regimen to see if it is safe and effective for you. Cholesterol and Your Health   AMBULATORY CARE:   Cholesterol  is a waxy, fat-like substance. Your body uses cholesterol to make hormones and new cells, and to protect nerves. Cholesterol is made by your body. It also comes from certain foods you eat, such as meat and dairy products. Your healthcare provider can help you set goals for your cholesterol levels. Your provider can help you create a plan to meet your goals. Cholesterol level goals: Your cholesterol level goals depend on your risk for heart disease, your age, and your other health conditions. The following are general guidelines: Total cholesterol  includes low-density lipoprotein (LDL), high-density lipoprotein (HDL), and triglyceride levels. The total cholesterol level should be lower than 200 mg/dL and is best at about 150 mg/dL. LDL cholesterol  is called bad cholesterol  because it forms plaque in your arteries. As plaque builds up, your arteries become narrow, and less blood flows through. When plaque decreases blood flow to your heart, you may have chest pain. If plaque completely blocks an artery that brings blood to your heart, you may have a heart attack. Plaque can break off and form blood clots. Blood clots may block arteries in your brain and cause a stroke. The level should be less than 130 mg/dL and is best at about 100 mg/dL. HDL cholesterol  is called good cholesterol  because it helps remove LDL cholesterol from your arteries. It does this by attaching to LDL cholesterol and carrying it to your liver. Your liver breaks down LDL cholesterol so your body can get rid of it.  High levels of HDL cholesterol can help prevent a heart attack and stroke. Low levels of HDL cholesterol can increase your risk for heart disease, heart attack, and stroke. The level should be at least 40 mg/dL in males or at least 50 mg/dL in females. Triglycerides  are a type of fat that store energy from foods you eat. High levels of triglycerides also cause plaque buildup. This can increase your risk for a heart attack or stroke. If your triglyceride level is high, your LDL cholesterol level may also be high. The level should be less than 150 mg/dL. Any of the following can increase your risk for high cholesterol:   Smoking or drinking large amounts of alcohol    Having overweight or obesity, or not getting enough exercise    A medical condition such as hypertension (high blood pressure) or diabetes    A family history of high cholesterol    Age older than 72    What you need to know about having your cholesterol levels checked: Adults 21to 39years of age should have their cholesterol levels checked every 4 to 6 years. Adults 45 years or older should have their cholesterol checked every 1 to 2 years. You may need your cholesterol checked more often, or at a younger age, if you have risk factors for heart disease. You may also need to have your cholesterol checked more often if you have other health conditions, such as diabetes. Blood tests are used to check cholesterol levels. Blood tests measure your levels of triglycerides, LDL cholesterol, and HDL cholesterol. How healthy fats affect your cholesterol levels:  Healthy fats, also called unsaturated fats, help lower LDL cholesterol and triglyceride levels. Healthy fats include the following:  Monounsaturated fats  are found in foods such as olive oil, canola oil, avocado, nuts, and olives. Polyunsaturated fats,  such as omega 3 fats, are found in fish, such as salmon, trout, and tuna. They can also be found in plant foods such as flaxseed, walnuts, and soybeans.     How unhealthy fats affect your cholesterol levels: Unhealthy fats increase LDL cholesterol and triglyceride levels. They are found in foods high in cholesterol, saturated fat, and trans fat:  Cholesterol  is found in eggs, dairy, and meat. Saturated fat  is found in butter, cheese, ice cream, whole milk, and coconut oil. Saturated fat is also found in meat, such as sausage, hot dogs, and bologna. Trans fat  is found in liquid oils and is used in fried and baked foods. Foods that contain trans fats include chips, crackers, muffins, sweet rolls, microwave popcorn, and cookies. Treatment  for high cholesterol will also decrease your risk of heart disease, heart attack, and stroke. Treatment may include any of the following:  Lifestyle changes  may include food, exercise, weight loss, and quitting smoking. You may also need to decrease the amount of alcohol you drink. Your healthcare provider will want you to start with lifestyle changes. Other treatment may be added if lifestyle changes are not enough. Your healthcare provider may recommend you work with a team to manage hyperlipidemia. The team may include medical experts such as a dietitian, an exercise or physical therapist, and a behavior therapist. Your family members may be included in helping you create lifestyle changes. Medicines  may be given to lower your LDL cholesterol, triglyceride levels, or total cholesterol level. You may need medicines to lower your cholesterol if any of the following is true:    You have a history of stroke, TIA, unstable angina, or a heart attack. Your LDL cholesterol level is 190 mg/dL or higher. You are age 36 to 76 years, have diabetes or heart disease risk factors, and your LDL cholesterol is 70 mg/dL or higher. Supplements  include fish oil, red yeast rice, and garlic. Fish oil may help lower your triglyceride and LDL cholesterol levels. It may also increase your HDL cholesterol level.  Red yeast rice may help decrease your total cholesterol level and LDL cholesterol level. Garlic may help lower your total cholesterol level. Do not take any supplements without talking to your healthcare provider. Food changes you can make to lower your cholesterol levels:  A dietitian can help you create a healthy eating plan. Your dietitian can show you how to read food labels and choose foods low in saturated fat, trans fats, and cholesterol. Decrease the total amount of fat you eat. Choose lean meats, fat-free or 1% fat milk, and low-fat dairy products, such as yogurt and cheese. Try to limit or avoid red meats. Limit or do not eat fried foods or baked goods, such as cookies. Replace unhealthy fats with healthy fats. Cook foods in olive oil or canola oil. Choose soft margarines that are low in saturated fat and trans fat. Seeds, nuts, and avocados are other examples of healthy fats. Eat foods with omega-3 fats. Examples include salmon, tuna, mackerel, walnuts, and flaxseed. Eat fish 2 times per week. Pregnant women should not eat fish that have high levels of mercury, such as shark, swordfish, and megan mackerel. Increase the amount of high-fiber foods you eat. High-fiber foods can help lower your LDL cholesterol. Aim to get between 20 and 30 grams of fiber each day. Fruits and vegetables are high in fiber. Eat at least 5 servings each day. Other high-fiber foods are whole-grain or whole-wheat breads, pastas, or cereals, and brown rice. Eat 3 ounces of whole-grain foods each day. Increase fiber slowly. You may have abdominal discomfort, bloating, and gas if you add fiber to your diet too quickly. Eat healthy protein foods. Examples include low-fat dairy products, skinless chicken and turkey, fish, and nuts. Limit foods and drinks that are high in sugar. Your dietitian or healthcare provider can help you create daily limits for high-sugar foods and drinks. The limit may be lower if you have diabetes or another health condition.  Limits can also help you lose weight if needed. Lifestyle changes you can make to lower your cholesterol levels:   Maintain a healthy weight. Ask your healthcare provider what a healthy weight is for you. Ask your provider to help you create a weight loss plan if needed. Weight loss can decrease your total cholesterol and triglyceride levels. Weight loss may also help keep your blood pressure at a healthy level. Be physically active throughout the day. Physical activity, such as exercise, can help lower your total cholesterol level and maintain a healthy weight. Physical activity can also help increase your HDL cholesterol level. Work with your healthcare provider to create an program that is right for you. Get at least 30 to 40 minutes of moderate physical activity most days of the week. Examples of exercise include brisk walking, swimming, or biking. Also include strength training at least 2 times each week. Your healthcare providers can help you create a physical activity plan. Do not smoke. Nicotine and other chemicals in cigarettes and cigars can raise your cholesterol levels. Ask your healthcare provider for information if you currently smoke and need help to quit. E-cigarettes or smokeless tobacco still contain nicotine. Talk to your healthcare provider before you use these products. Limit or do not drink alcohol. Alcohol can increase your triglyceride levels. Ask your healthcare provider before you drink alcohol. Ask how much is okay for you to drink in 24 hours or 1 week. Follow up with your doctor as directed:  Write down your questions so you remember to ask them during your visits. © Copyright [facility] 2023 Information is for End User's use only and may not be sold, redistributed or otherwise used for commercial purposes. The above information is an  only. It is not intended as medical advice for individual conditions or treatments.  Talk to your doctor, nurse or pharmacist before following any medical regimen to see if it is safe and effective for you.     Problem List Items Addressed This Visit          Digestive    GERD (gastroesophageal reflux disease)     History of silent GERD  Will start him on omeprazole          Relevant Medications    gabapentin (Neurontin) 100 mg capsule    omeprazole (PriLOSEC) 20 mg delayed release capsule       Musculoskeletal and Integument    Hand arthritis     11/20/2023  Has been to rheumatologist without any other improvement  Most pain is at night in his bilateral hands  Suggest wearing gloves to bed  Will also start him on gabapentin and ultram         Relevant Medications    traMADol (Ultram) 50 mg tablet       Genitourinary    Prostate cancer ([address])     Follows with urology  Currently stable   Continue sildenafil             Other    Hypercholesterolemia     Will check labs         Relevant Orders    Lipid panel    Annual physical exam - Primary     Lifestyle modification, diet and exercise discussed  Medications discussed and refilled appropriately  Labs discussed and ordered   Depression screening performed  Anxiety screening performed  Urinary Incontinence screening performed   BMI discussed  Fall screening performed         Relevant Orders    CBC and Platelet    Comprehensive metabolic panel    Screening for diabetes mellitus    Relevant Orders    Hemoglobin A1C    Vitamin D deficiency     Will check lab  May need supplement         Relevant Orders    Vitamin D 25 hydroxy    Encounter for immunization    Relevant Orders    TDAP VACCINE GREATER THAN OR EQUAL TO 6YO IM

## 2023-11-20 NOTE — ASSESSMENT & PLAN NOTE
Lifestyle modification, diet and exercise discussed  Medications discussed and refilled appropriately  Labs discussed and ordered   Depression screening performed  Anxiety screening performed  Urinary Incontinence screening performed   BMI discussed  Fall screening performed

## 2023-11-20 NOTE — ASSESSMENT & PLAN NOTE
11/20/2023  Has been to rheumatologist without any other improvement  Most pain is at night in his bilateral hands  Suggest wearing gloves to bed  Will also start him on gabapentin and ultram

## 2023-11-20 NOTE — PROGRESS NOTES
[id number]    NAME: Love Whitman  AGE: 79 y.o. SEX: male  : 1955     DATE: 2023     Assessment and Plan:     Problem List Items Addressed This Visit        Digestive    GERD (gastroesophageal reflux disease)     History of silent GERD  Will start him on omeprazole          Relevant Medications    gabapentin (Neurontin) 100 mg capsule    omeprazole (PriLOSEC) 20 mg delayed release capsule       Musculoskeletal and Integument    Hand arthritis     2023  Has been to rheumatologist without any other improvement  Most pain is at night in his bilateral hands  Suggest wearing gloves to bed  Will also start him on gabapentin and ultram         Relevant Medications    traMADol (Ultram) 50 mg tablet       Genitourinary    Prostate cancer ([address])     Follows with urology  Currently stable   Continue sildenafil             Other    Hypercholesterolemia     Will check labs         Relevant Orders    Lipid panel    Annual physical exam - Primary     Lifestyle modification, diet and exercise discussed  Medications discussed and refilled appropriately  Labs discussed and ordered   Depression screening performed  Anxiety screening performed  Urinary Incontinence screening performed   BMI discussed  Fall screening performed         Relevant Orders    CBC and Platelet    Comprehensive metabolic panel    Screening for diabetes mellitus    Relevant Orders    Hemoglobin A1C    Vitamin D deficiency     Will check lab  May need supplement         Relevant Orders    Vitamin D 25 hydroxy    Encounter for immunization    Relevant Orders    TDAP VACCINE GREATER THAN OR EQUAL TO 6YO IM       Immunizations and preventive care screenings were discussed with patient today. Appropriate education was printed on patient's after visit summary. Discussed risks and benefits of prostate cancer screening.  We discussed the controversial history of PSA screening for prostate cancer in the [facility] as well as the risk of over detection and over treatment of prostate cancer by way of PSA screening. The patient understands that PSA blood testing is an imperfect way to screen for prostate cancer and that elevated PSA levels in the blood may also be caused by infection, inflammation, prostatic trauma or manipulation, urological procedures, or by benign prostatic enlargement. The role of the digital rectal examination in prostate cancer screening was also discussed and I discussed with him that there is large interobserver variability in the findings of digital rectal examination. Counseling:  Alcohol/drug use: discussed moderation in alcohol intake, the recommendations for healthy alcohol use, and avoidance of illicit drug use. Dental Health: discussed importance of regular tooth brushing, flossing, and dental visits. Injury prevention: discussed safety/seat belts, safety helmets, smoke detectors, carbon dioxide detectors, and smoking near bedding or upholstery. Sexual health: discussed sexually transmitted diseases, partner selection, use of condoms, avoidance of unintended pregnancy, and contraceptive alternatives. Exercise: the importance of regular exercise/physical activity was discussed. Recommend exercise 3-5 times per week for at least 30 minutes. BMI Counseling: Body mass index is 29.47 kg/m². The BMI is above normal. Nutrition recommendations include decreasing portion sizes, encouraging healthy choices of fruits and vegetables, decreasing fast food intake, consuming healthier snacks, limiting drinks that contain sugar, moderation in carbohydrate intake, increasing intake of lean protein, reducing intake of saturated and trans fat and reducing intake of cholesterol. Exercise recommendations include exercising 3-5 times per week. No pharmacotherapy was ordered.  Rationale for BMI follow-up plan is due to patient being overweight or obese. Depression Screening and Follow-up Plan: Patient was screened for depression during today's encounter. They screened negative with a PHQ-2 score of 0. Falls Plan of Care: Assessed feet and footwear. Home safety education provided. Return in about 6 months (around 2024) for Recheck. Chief Complaint:     Chief Complaint   Patient presents with   • Physical Exam     Annual.    • Hand Pain     Would like to discuss arthritis pain in hands      History of Present Illness:     Adult Annual Physical   Patient here for a comprehensive physical exam. The patient reports problems - as discussed . Diet and Physical Activity  Diet/Nutrition: well balanced diet. Exercise: 1-2 times a week on average. Depression Screening  PHQ-2/9 Depression Screening    Little interest or pleasure in doing things: 0 - not at all  Feeling down, depressed, or hopeless: 0 - not at all  Trouble falling or staying asleep, or sleeping too much: 0 - not at all  Feeling tired or having little energy: 0 - not at all  Poor appetite or overeatin - not at all  Feeling bad about yourself - or that you are a failure or have let yourself or your family down: 0 - not at all  Trouble concentrating on things, such as reading the newspaper or watching television: 0 - not at all  Moving or speaking so slowly that other people could have noticed. Or the opposite - being so fidgety or restless that you have been moving around a lot more than usual: 0 - not at all  Thoughts that you would be better off dead, or of hurting yourself in some way: 0 - not at all  PHQ-2 Score: 0  PHQ-2 Interpretation: Negative depression screen  PHQ-9 Score: 0   PHQ-9 Interpretation: No or Minimal depression        General Health  Sleep: sleeps well. Hearing: normal - bilateral.  Vision: no vision problems. Dental: regular dental visits.         Health  Symptoms include: erectile dysfunction    Advanced Care Planning  Do you have an advanced directive? no  Do you have a durable medical power of ? no     Review of Systems:     Review of Systems   Constitutional:  Negative for activity change, chills, fatigue and fever. HENT:  Negative for rhinorrhea and sore throat. Eyes:  Negative for pain. Respiratory:  Negative for cough and shortness of breath. Cardiovascular:  Negative for chest pain, palpitations and leg swelling. Gastrointestinal:  Negative for abdominal pain, constipation, diarrhea, nausea and vomiting. GERD   Genitourinary:  Negative for difficulty urinating, flank pain, frequency and urgency. Musculoskeletal:  Positive for arthralgias, joint swelling and myalgias. Negative for gait problem. Skin:  Negative for color change. Neurological:  Negative for dizziness, weakness, light-headedness and headaches. Psychiatric/Behavioral:  Negative for sleep disturbance. The patient is not nervous/anxious. All other systems reviewed and are negative.      Past Medical History:     Past Medical History:   Diagnosis Date   • Arthritis     mostly hands   • Bladder stone    • Blue baby     at birth /with heart murmur/no longer noted   • Cancer [facility])    • Carpal tunnel syndrome    • Chronic pain disorder     "hands"   • Generalized joint pain    • GERD (gastroesophageal reflux disease)     "silent"   • Herniated disc, cervical     some neck pain   • History of kidney stones    • [place] (hard of hearing)     "slightly"   • Kidney stone    • Moderate exercise     "very active lifestyle/walking/at least 4 miles per day/active job"   • PONV (postoperative nausea and vomiting)     "almost every time after surgery"   • Prostate cancer ([address] )    • Rosacea    • Skin cancer    • Tinnitus    • Urinary tract infection       Past Surgical History:     Past Surgical History:   Procedure Laterality Date   • BACK SURGERY      HERNIATED DISC/Lumbar   • CARPAL TUNNEL RELEASE Bilateral    • COLONOSCOPY     • CT CYSTOGRAM  9/18/2019   • FL RETROGRADE PYELOGRAM  9/4/2019   • HERNIA REPAIR Right     inguinal   • NEUROPLASTY / TRANSPOSITION MEDIAN NERVE AT CARPAL TUNNEL BILATERAL     • AK LAPS SURG [id number] RPBIC RAD W/NRV SPARING ROBOT N/A 9/4/2019    Procedure: PROSTATECTOMY RADICAL W ROBOTICS; BILATERAL PELVIC LYMPH NODE DISSECTION LYSIS OF ADHESIONS Robotic; DOUBLE LEFT URETERAL STENT INSERTION with retrograde pyelogram, Instillation of ICG; left upper pole ureteral re-implantation Robotic;  Surgeon: Ashish Banegas MD;  Location: [facility];  Service: Urology   • PROSTATE BIOPSY     • PROSTATECTOMY     • SKIN LESION EXCISION      x2 on nose for melanoma   • SKIN TAG REMOVAL     • TONSILLECTOMY        Family History:     Family History   Problem Relation Age of Onset   • Hypertension Mother    • Stroke Father       Social History:     Social History     Socioeconomic History   • Marital status: /Civil Union     Spouse name: None   • Number of children: None   • Years of education: None   • Highest education level: None   Occupational History   • Occupation: RETIRED   Tobacco Use   • Smoking status: Never   • Smokeless tobacco: Never   Vaping Use   • Vaping Use: Never used   Substance and Sexual Activity   • Alcohol use:  Yes     Alcohol/week: 1.0 standard drink of alcohol     Types: 1 Glasses of wine per week     Comment: occasional wine   • Drug use: No   • Sexual activity: Yes     Partners: Female   Other Topics Concern   • None   Social History Narrative        RETIRED     Social Determinants of Health     Financial Resource Strain: Not on file   Food Insecurity: Not on file   Transportation Needs: Not on file   Physical Activity: Not on file   Stress: Not on file   Social Connections: Not on file   Intimate Partner Violence: Not on file   Housing Stability: Not on file      Current Medications:     Current Outpatient Medications   Medication Sig Dispense Refill   • gabapentin (Neurontin) 100 mg capsule Take 1 capsule (100 mg total) by mouth daily at bedtime 90 capsule 1   • ibuprofen (MOTRIN) 200 mg tablet Take 200 mg by mouth every 6 (six) hours as needed for mild pain For arthiritis/ pain     • omeprazole (PriLOSEC) 20 mg delayed release capsule Take 1 capsule (20 mg total) by mouth 2 (two) times a day 90 capsule 3   • sildenafil (VIAGRA) 100 mg tablet Take 1 tablet by mouth empty stomach one hour before sex 30 tablet 1   • traMADol (Ultram) 50 mg tablet Take 1 tablet (50 mg total) by mouth daily at bedtime 90 tablet 1   • VITAMIN D PO Take by mouth daily       No current facility-administered medications for this visit. Allergies: Allergies   Allergen Reactions   • Ciprofloxacin GI Intolerance   • Keflex [Cephalexin] GI Intolerance      Physical Exam:     /72   Pulse 96   Temp 98.3 °F (36.8 °C) (Tympanic)   Ht 5' 10" (1.778 m)   Wt 93.2 kg (205 lb 6.4 oz)   SpO2 96%   BMI 29.47 kg/m²     Physical Exam  Vitals and nursing note reviewed. Constitutional:       General: He is awake. Appearance: Normal appearance. He is well-developed and normal weight. HENT:      Head: Normocephalic and atraumatic. Nose: Nose normal.      Mouth/Throat:      Mouth: Mucous membranes are moist.   Eyes:      Conjunctiva/sclera: Conjunctivae normal.   Cardiovascular:      Rate and Rhythm: Normal rate and regular rhythm. Pulses: Normal pulses. Heart sounds: Normal heart sounds. No murmur heard. Pulmonary:      Effort: Pulmonary effort is normal. No respiratory distress. Breath sounds: Normal breath sounds. Abdominal:      General: Bowel sounds are normal.      Palpations: Abdomen is soft. Tenderness: There is no abdominal tenderness. Comments: Occasional GERD issues    Musculoskeletal:      Cervical back: Neck supple. Right lower leg: No edema. Left lower leg: No edema. Comments: Chronic arthritis pains   Skin:     General: Skin is warm and dry.    Neurological:      Mental Status: He is alert and oriented to person, place, and time. Psychiatric:         Attention and Perception: Attention normal.         Mood and Affect: Mood normal.         Speech: Speech normal.         Behavior: Behavior normal. Behavior is cooperative.           FAIZAN Knapp  [facility]

## 2023-11-22 ENCOUNTER — TELEPHONE (OUTPATIENT)
Dept: INTERNAL MEDICINE CLINIC | Facility: CLINIC | Age: 68
End: 2023-11-22

## 2023-11-22 NOTE — TELEPHONE ENCOUNTER
I lm that this is ok, nuria is aware of the drug interaction and she wants him to take both of these medications  [facility]

## 2023-12-18 ENCOUNTER — TELEPHONE (OUTPATIENT)
Age: 68
End: 2023-12-18

## 2023-12-18 DIAGNOSIS — C61 PROSTATE CANCER (HCC): Primary | ICD-10-CM

## 2023-12-18 NOTE — TELEPHONE ENCOUNTER
Pt under care of: Janette     Last Seen: 12/20/22     Pt calling due to:    Patients wife calling in to schedule 6 month follow up with our office to review patients PSA. They will need a new PSA order for patient. When looking to schedule patient we are scheduling out until April. Please review and call patient once PSA order is in and to schedule in appropriate time frame.     Patients days off are Monday and Tuesday     Pt can be reached at: [phone]

## 2023-12-18 NOTE — TELEPHONE ENCOUNTER
Contacted patient and scheduled him for overdue follow up with PSA prior. Patients last 2 visits were telemedicine and he was last seen in office in June of 2022. Patient asking if this visit can be virtual as well as he has about an hour drive to the office. Will forward to VASU to ensure VV is ok.

## 2023-12-19 NOTE — TELEPHONE ENCOUNTER
Called and LMOM for patient that his appointment was adjusted to virtual visit per request. Can offer [place] office in the future for follow ups as it would be closer to his home.

## 2023-12-19 NOTE — TELEPHONE ENCOUNTER
I'm ok with VV for the next one then in the future we could book him in [place] for followups

## 2023-12-27 DIAGNOSIS — N52.31 ERECTILE DYSFUNCTION AFTER RADICAL PROSTATECTOMY: ICD-10-CM

## 2023-12-27 RX ORDER — SILDENAFIL 100 MG/1
TABLET, FILM COATED ORAL
Qty: 30 TABLET | Refills: 1 | Status: SHIPPED | OUTPATIENT
Start: 2023-12-27

## 2023-12-27 NOTE — TELEPHONE ENCOUNTER
Reason for call: Pt spouse says they need med refill before start of new year due to getting medicare coverage  [x] Refill   [] Prior Auth  [] Other:     Office:   [] PCP/Provider -   [x] Specialty/Provider - Uro / Janette ([place])    Medication: sildenafil    Dose/Frequency: 100 mg/     Quantity: 30D w refill    Pharmacy: [facility] on file Mail Order    Does the patient have enough for 3 days?   [] Yes   [x] No - Send as HP to POD

## 2024-01-19 PROBLEM — Z13.1 SCREENING FOR DIABETES MELLITUS: Status: RESOLVED | Noted: 2022-08-09 | Resolved: 2024-01-19

## 2024-01-22 ENCOUNTER — APPOINTMENT (OUTPATIENT)
Dept: LAB | Facility: CLINIC | Age: 69
End: 2024-01-22
Payer: MEDICARE

## 2024-01-22 DIAGNOSIS — E78.00 HYPERCHOLESTEROLEMIA: ICD-10-CM

## 2024-01-22 DIAGNOSIS — E55.9 VITAMIN D DEFICIENCY: ICD-10-CM

## 2024-01-22 DIAGNOSIS — Z00.00 ANNUAL PHYSICAL EXAM: ICD-10-CM

## 2024-01-22 DIAGNOSIS — Z13.1 SCREENING FOR DIABETES MELLITUS: ICD-10-CM

## 2024-01-22 LAB
25(OH)D3 SERPL-MCNC: 32.9 NG/ML (ref 30–100)
ALBUMIN SERPL BCP-MCNC: 4.6 G/DL (ref 3.5–5)
ALP SERPL-CCNC: 45 U/L (ref 34–104)
ALT SERPL W P-5'-P-CCNC: 21 U/L (ref 7–52)
ANION GAP SERPL CALCULATED.3IONS-SCNC: 8 MMOL/L
AST SERPL W P-5'-P-CCNC: 19 U/L (ref 13–39)
BILIRUB SERPL-MCNC: 0.53 MG/DL (ref 0.2–1)
BUN SERPL-MCNC: 17 MG/DL (ref 5–25)
CALCIUM SERPL-MCNC: 9.4 MG/DL (ref 8.4–10.2)
CHLORIDE SERPL-SCNC: 103 MMOL/L (ref 96–108)
CHOLEST SERPL-MCNC: 291 MG/DL
CO2 SERPL-SCNC: 29 MMOL/L (ref 21–32)
CREAT SERPL-MCNC: 1.01 MG/DL (ref 0.6–1.3)
ERYTHROCYTE [DISTWIDTH] IN BLOOD BY AUTOMATED COUNT: 13 % (ref 11.6–15.1)
EST. AVERAGE GLUCOSE BLD GHB EST-MCNC: 111 MG/DL
GFR SERPL CREATININE-BSD FRML MDRD: 77 ML/MIN/1.73SQ M
GLUCOSE P FAST SERPL-MCNC: 91 MG/DL (ref 65–99)
HBA1C MFR BLD: 5.5 %
HCT VFR BLD AUTO: 46.7 % (ref 36.5–49.3)
HDLC SERPL-MCNC: 53 MG/DL
HGB BLD-MCNC: 15.5 G/DL (ref 12–17)
LDLC SERPL CALC-MCNC: 219 MG/DL (ref 0–100)
MCH RBC QN AUTO: 30.6 PG (ref 26.8–34.3)
MCHC RBC AUTO-ENTMCNC: 33.2 G/DL (ref 31.4–37.4)
MCV RBC AUTO: 92 FL (ref 82–98)
NONHDLC SERPL-MCNC: 238 MG/DL
PLATELET # BLD AUTO: 239 THOUSANDS/UL (ref 149–390)
PMV BLD AUTO: 10 FL (ref 8.9–12.7)
POTASSIUM SERPL-SCNC: 4.5 MMOL/L (ref 3.5–5.3)
PROT SERPL-MCNC: 7.6 G/DL (ref 6.4–8.4)
RBC # BLD AUTO: 5.07 MILLION/UL (ref 3.88–5.62)
SODIUM SERPL-SCNC: 140 MMOL/L (ref 135–147)
TRIGL SERPL-MCNC: 96 MG/DL
WBC # BLD AUTO: 6.38 THOUSAND/UL (ref 4.31–10.16)

## 2024-01-22 PROCEDURE — 83036 HEMOGLOBIN GLYCOSYLATED A1C: CPT

## 2024-01-22 PROCEDURE — 85027 COMPLETE CBC AUTOMATED: CPT

## 2024-01-22 PROCEDURE — 82306 VITAMIN D 25 HYDROXY: CPT

## 2024-01-22 PROCEDURE — 80053 COMPREHEN METABOLIC PANEL: CPT

## 2024-01-22 PROCEDURE — 80061 LIPID PANEL: CPT

## 2024-01-23 LAB — PSA SERPL DL<=0.01 NG/ML-MCNC: 0.17 NG/ML (ref 0–4)

## 2024-01-30 ENCOUNTER — TELEMEDICINE (OUTPATIENT)
Dept: UROLOGY | Facility: CLINIC | Age: 69
End: 2024-01-30
Payer: MEDICARE

## 2024-01-30 DIAGNOSIS — C61 PROSTATE CANCER (HCC): Primary | ICD-10-CM

## 2024-01-30 PROCEDURE — 99213 OFFICE O/P EST LOW 20 MIN: CPT | Performed by: PHYSICIAN ASSISTANT

## 2024-01-30 NOTE — PROGRESS NOTES
Virtual Brief Visit    This Visit is being completed by telephone. The Patient is located at Home and in the following state in which I hold an active license [place]    The patient was identified by name and date of birth. Dami Whitman was informed that this is a telemedicine visit and that the visit is being conducted through TEAMs.  My office door was closed. No one else was in the room.  He acknowledged consent and understanding of privacy and security of the video platform. The patient has agreed to participate and understands they can discontinue the visit at any time.    Patient is aware this is a billable service.       Assessment/Plan:    P T3a Larry 3+4 equal 7 prostate cancer  Radical prostatectomy September 2019  First postoperative PSA 0.026, previous peak 0.105 had gone back down a little bit, now has doubled again to 0.174.  We talked about biochemical recurrence, possible causes for fluctuations including benign and malignant causes.    Taking into account early doubling time and overall trajectory now I have further discussed with Marcos about a PSMA PET scan for restaging and the likely referral to radiation oncology for salvage treatment.    Continues to do well from a voiding standpoint no incontinence.  Using sildenafil and vacuum assist device for erections which works well more than half the time.    Will follow-up with Dr Ceja or myself after the PET scan      Component  Ref Range & Units 1/22/24  7:32 AM 12/14/22  8:33 AM 9/19/22  7:56 AM 6/21/22  7:52 AM 12/17/21  8:15 AM 6/16/21  7:46 AM 12/4/20  8:23 AM   PSA, Ultrasensitive  0.000 - 4.000 ng/mL 0.174 0.085 CM 0.095 CM 0.105 CM 0.043 CM 0.043 CM 0.031 CM         Problem List Items Addressed This Visit          Genitourinary    Prostate cancer (HCC) - Primary    Relevant Orders    NM PET CT skull base to mid thigh       Recent Visits  No visits were found meeting these conditions.  Showing recent visits within past 7  days and meeting all other requirements  Today's Visits  Date Type Provider Dept   01/30/24 Telemedicine Janette Ford PA-C  [facility]   Showing today's visits and meeting all other requirements  Future Appointments  No visits were found meeting these conditions.  Showing future appointments within next 150 days and meeting all other requirements         Visit Time  Total Visit Duration: 15 minutes

## 2024-01-31 DIAGNOSIS — E78.2 MIXED HYPERLIPIDEMIA: Primary | ICD-10-CM

## 2024-01-31 RX ORDER — ATORVASTATIN CALCIUM 20 MG/1
20 TABLET, FILM COATED ORAL EVERY EVENING
Qty: 90 TABLET | Refills: 3 | Status: SHIPPED | OUTPATIENT
Start: 2024-01-31

## 2024-02-19 ENCOUNTER — HOSPITAL ENCOUNTER (OUTPATIENT)
Dept: RADIOLOGY | Age: 69
Discharge: HOME | End: 2024-02-19
Payer: MEDICARE

## 2024-02-19 DIAGNOSIS — R97.21 RISING PSA FOLLOWING TREATMENT FOR MALIGNANT NEOPLASM OF PROSTATE: ICD-10-CM

## 2024-02-19 DIAGNOSIS — C61 PROSTATE CANCER (HCC): ICD-10-CM

## 2024-02-19 PROBLEM — Z00.00 MEDICARE WELCOME EXAM: Status: ACTIVE | Noted: 2024-02-19

## 2024-02-19 PROBLEM — G89.4 CHRONIC PAIN DISORDER: Status: ACTIVE | Noted: 2024-02-19

## 2024-02-19 PROCEDURE — 78815 PET IMAGE W/CT SKULL-THIGH: CPT

## 2024-02-19 PROCEDURE — G1004 CDSM NDSC: HCPCS

## 2024-02-19 PROCEDURE — A9595 HB PIFLUFOLASTAT F-18, DIAGNOSTIC, 1 MILLICURIE: HCPCS

## 2024-02-19 NOTE — ASSESSMENT & PLAN NOTE
Component  Ref Range & Units 1/22/24  7:32 AM 6/21/22  7:52 AM 6/1/21  3:10 PM   Vit D, 25-Hydroxy  30.0 - 100.0 ng/mL 32.9 32.6 21.6 Low      Continue supplement of 4000 units daily

## 2024-02-19 NOTE — ASSESSMENT & PLAN NOTE
Component  Ref Range & Units 1/22/24  7:32 AM 6/21/22  7:52 AM 6/16/21  7:46 AM 8/13/18 11:15 AM   Cholesterol  See Comment mg/dL 291 High  263 High   High  R,  High  R, CM   Comment: Cholesterol:        Pediatric <18 Years        Desirable          <170 mg/dL      Borderline High    170-199 mg/dL      High               >=200 mg/dL        Adult >=18 Years           Desirable         <200 mg/dL      Borderline High   200-239 mg/dL      High             >239 mg/dL   Triglycerides  See Comment mg/dL 96 162 High   R,  High  R, CM   Comment: Triglyceride:     0-9Y            <75mg/dL     10Y-17Y         <90 mg/dL       >=18Y     Normal          <150 mg/dL     Borderline High 150-199 mg/dL     High            200-499 mg/dL       Very High       >499 mg/dL    Specimen collection should occur prior to Metamizole administration due to the potential for falsely depressed results.   HDL, Direct  >=40 mg/dL 53 38 Low  CM 47 CM 44 R, CM   LDL Calculated  0 - 100 mg/dL 219 High  193 High   High   High  CM   Comment: LDL Cholesterol:    Optimal           <100 mg/dl    Near Optimal      100-129 mg/dl    Above Optimal      Borderline High 130-159 mg/dl      High            160-189 mg/dl      Very High       >189 mg/dl        2/20/2024  Has been on lipitor 20mg  Will stop the lipitor due to his hand and leg muscle aches  Will start him on zetia and fenofibrate

## 2024-02-19 NOTE — PATIENT INSTRUCTIONS
Medicare Preventive Visit Patient Instructions  Thank you for completing your Welcome to Medicare Visit or Medicare Annual Wellness Visit today. Your next wellness visit will be due in one year (2/20/2025).  The screening/preventive services that you may require over the next 5-10 years are detailed below. Some tests may not apply to you based off risk factors and/or age. Screening tests ordered at today's visit but not completed yet may show as past due. Also, please note that scanned in results may not display below.  Preventive Screenings:  Service Recommendations Previous Testing/Comments   Colorectal Cancer Screening  Colonoscopy    Fecal Occult Blood Test (FOBT)/Fecal Immunochemical Test (FIT)  Fecal DNA/Cologuard Test  Flexible Sigmoidoscopy Age: 45-75 years old   Colonoscopy: every 10 years (May be performed more frequently if at higher risk)  OR  FOBT/FIT: every 1 year  OR  Cologuard: every 3 years  OR  Sigmoidoscopy: every 5 years  Screening may be recommended earlier than age 45 if at higher risk for colorectal cancer. Also, an individualized decision between you and your healthcare provider will decide whether screening between the ages of 76-85 would be appropriate. Colonoscopy: 08/17/2015  FOBT/FIT: Not on file  Cologuard: Not on file  Sigmoidoscopy: Not on file    Screening Current     Prostate Cancer Screening Individualized decision between patient and health care provider in men between ages of 55-69   Medicare will cover every 12 months beginning on the day after your 50th birthday PSA: 0.09 ng/mL     History Prostate Cancer     Hepatitis C Screening Once for adults born between 1945 and 1965  More frequently in patients at high risk for Hepatitis C Hep C Antibody: 01/09/2019    Screening Current   Diabetes Screening 1-2 times per year if you're at risk for diabetes or have pre-diabetes Fasting glucose: 91 mg/dL (1/22/2024)  A1C: 5.5 % (1/22/2024)  Screening Current   Cholesterol Screening Once  every 5 years if you don't have a lipid disorder. May order more often based on risk factors. Lipid panel: 01/22/2024  Screening Not Indicated  History Lipid Disorder      Other Preventive Screenings Covered by Medicare:  Abdominal Aortic Aneurysm (AAA) Screening: covered once if your at risk. You're considered to be at risk if you have a family history of AAA or a male between the age of 65-75 who smoking at least 100 cigarettes in your lifetime.  Lung Cancer Screening: covers low dose CT scan once per year if you meet all of the following conditions: (1) Age 55-77; (2) No signs or symptoms of lung cancer; (3) Current smoker or have quit smoking within the last 15 years; (4) You have a tobacco smoking history of at least 20 pack years (packs per day x number of years you smoked); (5) You get a written order from a healthcare provider.  Glaucoma Screening: covered annually if you're considered high risk: (1) You have diabetes OR (2) Family history of glaucoma OR (3)  aged 50 and older OR (4)  American aged 65 and older  Osteoporosis Screening: covered every 2 years if you meet one of the following conditions: (1) Have a vertebral abnormality; (2) On glucocorticoid therapy for more than 3 months; (3) Have primary hyperparathyroidism; (4) On osteoporosis medications and need to assess response to drug therapy.  HIV Screening: covered annually if you're between the age of 15-65. Also covered annually if you are younger than 15 and older than 65 with risk factors for HIV infection. For pregnant patients, it is covered up to 3 times per pregnancy.    Immunizations:  Immunization Recommendations   Influenza Vaccine Annual influenza vaccination during flu season is recommended for all persons aged >= 6 months who do not have contraindications   Pneumococcal Vaccine   * Pneumococcal conjugate vaccine = PCV13 (Prevnar 13), PCV15 (Vaxneuvance), PCV20 (Prevnar 20)  * Pneumococcal polysaccharide vaccine  = PPSV23 (Pneumovax) Adults 19-65 yo with certain risk factors or if 65+ yo  If never received any pneumonia vaccine: recommend Prevnar 20 (PCV20)  Give PCV20 if previously received 1 dose of PCV13 or PPSV23   Hepatitis B Vaccine 3 dose series if at intermediate or high risk (ex: diabetes, end stage renal disease, liver disease)   Respiratory syncytial virus (RSV) Vaccine - COVERED BY MEDICARE PART D  * RSVPreF3 (Arexvy) CDC recommends that adults 60 years of age and older may receive a single dose of RSV vaccine using shared clinical decision-making (SCDM)   Tetanus (Td) Vaccine - COST NOT COVERED BY MEDICARE PART B Following completion of primary series, a booster dose should be given every 10 years to maintain immunity against tetanus. Td may also be given as tetanus wound prophylaxis.   Tdap Vaccine - COST NOT COVERED BY MEDICARE PART B Recommended at least once for all adults. For pregnant patients, recommended with each pregnancy.   Shingles Vaccine (Shingrix) - COST NOT COVERED BY MEDICARE PART B  2 shot series recommended in those 19 years and older who have or will have weakened immune systems or those 50 years and older     Health Maintenance Due:      Topic Date Due   • Colorectal Cancer Screening  08/17/2025   • Hepatitis C Screening  Completed     Immunizations Due:      Topic Date Due   • Pneumococcal Vaccine: 65+ Years (1 of 1 - PCV) Never done   • Influenza Vaccine (1) Never done   • COVID-19 Vaccine (4 - 2023-24 season) 09/01/2023     Advance Directives   What are advance directives?  Advance directives are legal documents that state your wishes and plans for medical care. These plans are made ahead of time in case you lose your ability to make decisions for yourself. Advance directives can apply to any medical decision, such as the treatments you want, and if you want to donate organs.   What are the types of advance directives?  There are many types of advance directives, and each state has rules  about how to use them. You may choose a combination of any of the following:  Living will:  This is a written record of the treatment you want. You can also choose which treatments you do not want, which to limit, and which to stop at a certain time. This includes surgery, medicine, IV fluid, and tube feedings.   Durable power of  for healthcare (DPAHC):  This is a written record that states who you want to make healthcare choices for you when you are unable to make them for yourself. This person, called a proxy, is usually a family member or a friend. You may choose more than 1 proxy.  Do not resuscitate (DNR) order:  A DNR order is used in case your heart stops beating or you stop breathing. It is a request not to have certain forms of treatment, such as CPR. A DNR order may be included in other types of advance directives.  Medical directive:  This covers the care that you want if you are in a coma, near death, or unable to make decisions for yourself. You can list the treatments you want for each condition. Treatment may include pain medicine, surgery, blood transfusions, dialysis, IV or tube feedings, and a ventilator (breathing machine).  Values history:  This document has questions about your views, beliefs, and how you feel and think about life. This information can help others choose the care that you would choose.  Why are advance directives important?  An advance directive helps you control your care. Although spoken wishes may be used, it is better to have your wishes written down. Spoken wishes can be misunderstood, or not followed. Treatments may be given even if you do not want them. An advance directive may make it easier for your family to make difficult choices about your care.   Weight Management   Why it is important to manage your weight:  Being overweight increases your risk of health conditions such as heart disease, high blood pressure, type 2 diabetes, and certain types of cancer. It  can also increase your risk for osteoarthritis, sleep apnea, and other respiratory problems. Aim for a slow, steady weight loss. Even a small amount of weight loss can lower your risk of health problems.  How to lose weight safely:  A safe and healthy way to lose weight is to eat fewer calories and get regular exercise. You can lose up about 1 pound a week by decreasing the number of calories you eat by 500 calories each day.   Healthy meal plan for weight management:  A healthy meal plan includes a variety of foods, contains fewer calories, and helps you stay healthy. A healthy meal plan includes the following:  Eat whole-grain foods more often.  A healthy meal plan should contain fiber. Fiber is the part of grains, fruits, and vegetables that is not broken down by your body. Whole-grain foods are healthy and provide extra fiber in your diet. Some examples of whole-grain foods are whole-wheat breads and pastas, oatmeal, brown rice, and bulgur.  Eat a variety of vegetables every day.  Include dark, leafy greens such as spinach, kale, reena greens, and mustard greens. Eat yellow and orange vegetables such as carrots, sweet potatoes, and winter squash.   Eat a variety of fruits every day.  Choose fresh or canned fruit (canned in its own juice or light syrup) instead of juice. Fruit juice has very little or no fiber.  Eat low-fat dairy foods.  Drink fat-free (skim) milk or 1% milk. Eat fat-free yogurt and low-fat cottage cheese. Try low-fat cheeses such as mozzarella and other reduced-fat cheeses.  Choose meat and other protein foods that are low in fat.  Choose beans or other legumes such as split peas or lentils. Choose fish, skinless poultry (chicken or turkey), or lean cuts of red meat (beef or pork). Before you cook meat or poultry, cut off any visible fat.   Use less fat and oil.  Try baking foods instead of frying them. Add less fat, such as margarine, sour cream, regular salad dressing and mayonnaise to  foods. Eat fewer high-fat foods. Some examples of high-fat foods include french fries, doughnuts, ice cream, and cakes.  Eat fewer sweets.  Limit foods and drinks that are high in sugar. This includes candy, cookies, regular soda, and sweetened drinks.  Exercise:  Exercise at least 30 minutes per day on most days of the week. Some examples of exercise include walking, biking, dancing, and swimming. You can also fit in more physical activity by taking the stairs instead of the elevator or parking farther away from stores. Ask your healthcare provider about the best exercise plan for you.      © Copyright [employer] 2018 Information is for End User's use only and may not be sold, redistributed or otherwise used for commercial purposes. All illustrations and images included in CareNotes® are the copyrighted property of [employer], [employer] or [facility]    Medicare Preventive Visit Patient Instructions  Thank you for completing your Welcome to Medicare Visit or Medicare Annual Wellness Visit today. Your next wellness visit will be due in one year (2/20/2025).  The screening/preventive services that you may require over the next 5-10 years are detailed below. Some tests may not apply to you based off risk factors and/or age. Screening tests ordered at today's visit but not completed yet may show as past due. Also, please note that scanned in results may not display below.  Preventive Screenings:  Service Recommendations Previous Testing/Comments   Colorectal Cancer Screening  Colonoscopy    Fecal Occult Blood Test (FOBT)/Fecal Immunochemical Test (FIT)  Fecal DNA/Cologuard Test  Flexible Sigmoidoscopy Age: 45-75 years old   Colonoscopy: every 10 years (May be performed more frequently if at higher risk)  OR  FOBT/FIT: every 1 year  OR  Cologuard: every 3 years  OR  Sigmoidoscopy: every 5 years  Screening may be recommended earlier than age 45 if at higher risk for colorectal cancer. Also, an individualized  decision between you and your healthcare provider will decide whether screening between the ages of 76-85 would be appropriate. Colonoscopy: 08/17/2015  FOBT/FIT: Not on file  Cologuard: Not on file  Sigmoidoscopy: Not on file    Screening Current     Prostate Cancer Screening Individualized decision between patient and health care provider in men between ages of 55-69   Medicare will cover every 12 months beginning on the day after your 50th birthday PSA: 0.09 ng/mL     History Prostate Cancer     Hepatitis C Screening Once for adults born between 1945 and 1965  More frequently in patients at high risk for Hepatitis C Hep C Antibody: 01/09/2019    Screening Current   Diabetes Screening 1-2 times per year if you're at risk for diabetes or have pre-diabetes Fasting glucose: 91 mg/dL (1/22/2024)  A1C: 5.5 % (1/22/2024)  Screening Current   Cholesterol Screening Once every 5 years if you don't have a lipid disorder. May order more often based on risk factors. Lipid panel: 01/22/2024  Screening Not Indicated  History Lipid Disorder      Other Preventive Screenings Covered by Medicare:  Abdominal Aortic Aneurysm (AAA) Screening: covered once if your at risk. You're considered to be at risk if you have a family history of AAA or a male between the age of 65-75 who smoking at least 100 cigarettes in your lifetime.  Lung Cancer Screening: covers low dose CT scan once per year if you meet all of the following conditions: (1) Age 55-77; (2) No signs or symptoms of lung cancer; (3) Current smoker or have quit smoking within the last 15 years; (4) You have a tobacco smoking history of at least 20 pack years (packs per day x number of years you smoked); (5) You get a written order from a healthcare provider.  Glaucoma Screening: covered annually if you're considered high risk: (1) You have diabetes OR (2) Family history of glaucoma OR (3)  aged 50 and older OR (4)  American aged 65 and older  Osteoporosis  Screening: covered every 2 years if you meet one of the following conditions: (1) Have a vertebral abnormality; (2) On glucocorticoid therapy for more than 3 months; (3) Have primary hyperparathyroidism; (4) On osteoporosis medications and need to assess response to drug therapy.  HIV Screening: covered annually if you're between the age of 15-65. Also covered annually if you are younger than 15 and older than 65 with risk factors for HIV infection. For pregnant patients, it is covered up to 3 times per pregnancy.    Immunizations:  Immunization Recommendations   Influenza Vaccine Annual influenza vaccination during flu season is recommended for all persons aged >= 6 months who do not have contraindications   Pneumococcal Vaccine   * Pneumococcal conjugate vaccine = PCV13 (Prevnar 13), PCV15 (Vaxneuvance), PCV20 (Prevnar 20)  * Pneumococcal polysaccharide vaccine = PPSV23 (Pneumovax) Adults 19-63 yo with certain risk factors or if 65+ yo  If never received any pneumonia vaccine: recommend Prevnar 20 (PCV20)  Give PCV20 if previously received 1 dose of PCV13 or PPSV23   Hepatitis B Vaccine 3 dose series if at intermediate or high risk (ex: diabetes, end stage renal disease, liver disease)   Respiratory syncytial virus (RSV) Vaccine - COVERED BY MEDICARE PART D  * RSVPreF3 (Arexvy) CDC recommends that adults 60 years of age and older may receive a single dose of RSV vaccine using shared clinical decision-making (SCDM)   Tetanus (Td) Vaccine - COST NOT COVERED BY MEDICARE PART B Following completion of primary series, a booster dose should be given every 10 years to maintain immunity against tetanus. Td may also be given as tetanus wound prophylaxis.   Tdap Vaccine - COST NOT COVERED BY MEDICARE PART B Recommended at least once for all adults. For pregnant patients, recommended with each pregnancy.   Shingles Vaccine (Shingrix) - COST NOT COVERED BY MEDICARE PART B  2 shot series recommended in those 19 years and older  who have or will have weakened immune systems or those 50 years and older     Health Maintenance Due:      Topic Date Due   • Colorectal Cancer Screening  08/17/2025   • Hepatitis C Screening  Completed     Immunizations Due:      Topic Date Due   • Pneumococcal Vaccine: 65+ Years (1 of 1 - PCV) Never done   • Influenza Vaccine (1) Never done   • COVID-19 Vaccine (4 - 2023-24 season) 09/01/2023     Advance Directives   What are advance directives?  Advance directives are legal documents that state your wishes and plans for medical care. These plans are made ahead of time in case you lose your ability to make decisions for yourself. Advance directives can apply to any medical decision, such as the treatments you want, and if you want to donate organs.   What are the types of advance directives?  There are many types of advance directives, and each state has rules about how to use them. You may choose a combination of any of the following:  Living will:  This is a written record of the treatment you want. You can also choose which treatments you do not want, which to limit, and which to stop at a certain time. This includes surgery, medicine, IV fluid, and tube feedings.   Durable power of  for healthcare (DPAHC):  This is a written record that states who you want to make healthcare choices for you when you are unable to make them for yourself. This person, called a proxy, is usually a family member or a friend. You may choose more than 1 proxy.  Do not resuscitate (DNR) order:  A DNR order is used in case your heart stops beating or you stop breathing. It is a request not to have certain forms of treatment, such as CPR. A DNR order may be included in other types of advance directives.  Medical directive:  This covers the care that you want if you are in a coma, near death, or unable to make decisions for yourself. You can list the treatments you want for each condition. Treatment may include pain medicine,  surgery, blood transfusions, dialysis, IV or tube feedings, and a ventilator (breathing machine).  Values history:  This document has questions about your views, beliefs, and how you feel and think about life. This information can help others choose the care that you would choose.  Why are advance directives important?  An advance directive helps you control your care. Although spoken wishes may be used, it is better to have your wishes written down. Spoken wishes can be misunderstood, or not followed. Treatments may be given even if you do not want them. An advance directive may make it easier for your family to make difficult choices about your care.   Weight Management   Why it is important to manage your weight:  Being overweight increases your risk of health conditions such as heart disease, high blood pressure, type 2 diabetes, and certain types of cancer. It can also increase your risk for osteoarthritis, sleep apnea, and other respiratory problems. Aim for a slow, steady weight loss. Even a small amount of weight loss can lower your risk of health problems.  How to lose weight safely:  A safe and healthy way to lose weight is to eat fewer calories and get regular exercise. You can lose up about 1 pound a week by decreasing the number of calories you eat by 500 calories each day.   Healthy meal plan for weight management:  A healthy meal plan includes a variety of foods, contains fewer calories, and helps you stay healthy. A healthy meal plan includes the following:  Eat whole-grain foods more often.  A healthy meal plan should contain fiber. Fiber is the part of grains, fruits, and vegetables that is not broken down by your body. Whole-grain foods are healthy and provide extra fiber in your diet. Some examples of whole-grain foods are whole-wheat breads and pastas, oatmeal, brown rice, and bulgur.  Eat a variety of vegetables every day.  Include dark, leafy greens such as spinach, kale, reena greens, and  mustard greens. Eat yellow and orange vegetables such as carrots, sweet potatoes, and winter squash.   Eat a variety of fruits every day.  Choose fresh or canned fruit (canned in its own juice or light syrup) instead of juice. Fruit juice has very little or no fiber.  Eat low-fat dairy foods.  Drink fat-free (skim) milk or 1% milk. Eat fat-free yogurt and low-fat cottage cheese. Try low-fat cheeses such as mozzarella and other reduced-fat cheeses.  Choose meat and other protein foods that are low in fat.  Choose beans or other legumes such as split peas or lentils. Choose fish, skinless poultry (chicken or turkey), or lean cuts of red meat (beef or pork). Before you cook meat or poultry, cut off any visible fat.   Use less fat and oil.  Try baking foods instead of frying them. Add less fat, such as margarine, sour cream, regular salad dressing and mayonnaise to foods. Eat fewer high-fat foods. Some examples of high-fat foods include french fries, doughnuts, ice cream, and cakes.  Eat fewer sweets.  Limit foods and drinks that are high in sugar. This includes candy, cookies, regular soda, and sweetened drinks.  Exercise:  Exercise at least 30 minutes per day on most days of the week. Some examples of exercise include walking, biking, dancing, and swimming. You can also fit in more physical activity by taking the stairs instead of the elevator or parking farther away from stores. Ask your healthcare provider about the best exercise plan for you.      © Copyright [employer] 2018 Information is for End User's use only and may not be sold, redistributed or otherwise used for commercial purposes. All illustrations and images included in CareNotes® are the copyrighted property of [employer] or [employer]

## 2024-02-19 NOTE — ASSESSMENT & PLAN NOTE
Component  Ref Range & Units 1/22/24  7:32 AM 12/14/22  8:33 AM 9/19/22  7:56 AM 6/21/22  7:52 AM 12/17/21  8:15 AM 6/16/21  7:46 AM 12/4/20  8:23 AM   PSA, Ultrasensitive  0.000 - 4.000 ng/mL 0.174 0.085 CM 0.095 CM 0.105 CM 0.043 CM 0.043 CM 0.031     Stable   Continue viagra

## 2024-02-19 NOTE — ASSESSMENT & PLAN NOTE
Lifestyle modification, diet and exercise discussed  Medications discussed and refilled appropriately  Labs discussed and ordered   Depression screening performed  Anxiety screening performed  Urinary Incontinence screening performed   BMI discussed

## 2024-02-20 ENCOUNTER — OFFICE VISIT (OUTPATIENT)
Dept: INTERNAL MEDICINE CLINIC | Facility: CLINIC | Age: 69
End: 2024-02-20
Payer: MEDICARE

## 2024-02-20 VITALS
DIASTOLIC BLOOD PRESSURE: 82 MMHG | WEIGHT: 202 LBS | TEMPERATURE: 97.6 F | HEIGHT: 69 IN | HEART RATE: 71 BPM | SYSTOLIC BLOOD PRESSURE: 140 MMHG | BODY MASS INDEX: 29.92 KG/M2 | OXYGEN SATURATION: 98 %

## 2024-02-20 DIAGNOSIS — Z13.6 SCREENING FOR AAA (ABDOMINAL AORTIC ANEURYSM): ICD-10-CM

## 2024-02-20 DIAGNOSIS — G89.4 CHRONIC PAIN DISORDER: ICD-10-CM

## 2024-02-20 DIAGNOSIS — E55.9 VITAMIN D DEFICIENCY: ICD-10-CM

## 2024-02-20 DIAGNOSIS — Z23 ENCOUNTER FOR IMMUNIZATION: ICD-10-CM

## 2024-02-20 DIAGNOSIS — E78.2 MIXED HYPERLIPIDEMIA: ICD-10-CM

## 2024-02-20 DIAGNOSIS — C61 PROSTATE CANCER (HCC): ICD-10-CM

## 2024-02-20 DIAGNOSIS — M19.049 HAND ARTHRITIS: ICD-10-CM

## 2024-02-20 DIAGNOSIS — N52.1 ERECTILE DYSFUNCTION DUE TO DISEASES CLASSIFIED ELSEWHERE: ICD-10-CM

## 2024-02-20 DIAGNOSIS — K21.9 GASTROESOPHAGEAL REFLUX DISEASE WITHOUT ESOPHAGITIS: ICD-10-CM

## 2024-02-20 DIAGNOSIS — Z00.00 MEDICARE WELCOME EXAM: Primary | ICD-10-CM

## 2024-02-20 PROCEDURE — 99214 OFFICE O/P EST MOD 30 MIN: CPT | Performed by: NURSE PRACTITIONER

## 2024-02-20 PROCEDURE — G0402 INITIAL PREVENTIVE EXAM: HCPCS | Performed by: NURSE PRACTITIONER

## 2024-02-20 RX ORDER — EZETIMIBE 10 MG/1
10 TABLET ORAL DAILY
Qty: 90 TABLET | Refills: 3 | Status: SHIPPED | OUTPATIENT
Start: 2024-02-20

## 2024-02-20 RX ORDER — MELOXICAM 15 MG/1
15 TABLET ORAL DAILY
Qty: 90 TABLET | Refills: 1 | Status: SHIPPED | OUTPATIENT
Start: 2024-02-20

## 2024-02-20 RX ORDER — MULTIVIT-MIN/IRON/FOLIC ACID/K 18-600-40
4000 CAPSULE ORAL DAILY
COMMUNITY

## 2024-02-20 RX ORDER — FENOFIBRATE 54 MG/1
54 TABLET ORAL DAILY
Qty: 90 TABLET | Refills: 3 | Status: SHIPPED | OUTPATIENT
Start: 2024-02-20

## 2024-02-20 RX ORDER — OMEPRAZOLE 20 MG/1
20 CAPSULE, DELAYED RELEASE ORAL 2 TIMES DAILY
Start: 2024-02-20 | End: 2025-02-14

## 2024-02-20 RX ORDER — CALCIUM CARBONATE 500 MG/1
2 TABLET, CHEWABLE ORAL 3 TIMES DAILY
COMMUNITY

## 2024-02-20 NOTE — ASSESSMENT & PLAN NOTE
11/20/2023  Has been to rheumatologist without any other improvement  Most pain is at night in his bilateral hands  Suggest wearing gloves to bed  Will also start him on gabapentin and ultram     2/20/2024  Takes 600mg as needed of ibuprofen   He has stopped both the gabapentin and the tramadol  Restarted his omeprazole   Will start him on mobic and monitor

## 2024-02-20 NOTE — PROGRESS NOTES
Assessment and Plan:     Problem List Items Addressed This Visit          Digestive    Gastroesophageal reflux disease without esophagitis     Continue omeprazole and tums          Relevant Medications    calcium carbonate (Tums) 500 mg chewable tablet    omeprazole (PriLOSEC) 20 mg delayed release capsule       Musculoskeletal and Integument    Hand arthritis     11/20/2023  Has been to rheumatologist without any other improvement  Most pain is at night in his bilateral hands  Suggest wearing gloves to bed  Will also start him on gabapentin and ultram     2/20/2024  Takes 600mg as needed of ibuprofen   He has stopped both the gabapentin and the tramadol  Restarted his omeprazole   Will start him on mobic and monitor          Relevant Medications    meloxicam (Mobic) 15 mg tablet       Genitourinary    Prostate cancer (HCC)     Component  Ref Range & Units 1/22/24  7:32 AM 12/14/22  8:33 AM 9/19/22  7:56 AM 6/21/22  7:52 AM 12/17/21  8:15 AM 6/16/21  7:46 AM 12/4/20  8:23 AM   PSA, Ultrasensitive  0.000 - 4.000 ng/mL 0.174 0.085 CM 0.095 CM 0.105 CM 0.043 CM 0.043 CM 0.031     Stable   Continue viagra             Other    Mixed hyperlipidemia            Component  Ref Range & Units 1/22/24  7:32 AM 6/21/22  7:52 AM 6/16/21  7:46 AM 8/13/18 11:15 AM   Cholesterol  See Comment mg/dL 291 High  263 High   High  R,  High  R, CM   Comment: Cholesterol:        Pediatric <18 Years        Desirable          <170 mg/dL      Borderline High    170-199 mg/dL      High               >=200 mg/dL        Adult >=18 Years           Desirable         <200 mg/dL      Borderline High   200-239 mg/dL      High             >239 mg/dL   Triglycerides  See Comment mg/dL 96 162 High   R,  High  R, CM   Comment: Triglyceride:     0-9Y            <75mg/dL     10Y-17Y         <90 mg/dL       >=18Y     Normal          <150 mg/dL     Borderline High 150-199 mg/dL     High            200-499 mg/dL       Very High        >499 mg/dL    Specimen collection should occur prior to Metamizole administration due to the potential for falsely depressed results.   HDL, Direct  >=40 mg/dL 53 38 Low  CM 47 CM 44 R, CM   LDL Calculated  0 - 100 mg/dL 219 High  193 High   High   High  CM   Comment: LDL Cholesterol:    Optimal           <100 mg/dl    Near Optimal      100-129 mg/dl    Above Optimal      Borderline High 130-159 mg/dl      High            160-189 mg/dl      Very High       >189 mg/dl        2/20/2024  Has been on lipitor 20mg  Will stop the lipitor due to his hand and leg muscle aches  Will start him on zetia and fenofibrate          Relevant Medications    ezetimibe (ZETIA) 10 mg tablet    fenofibrate (TRICOR) 54 MG tablet    Other Relevant Orders    Lipid panel    Erectile dysfunction     Continue viagra          Vitamin D deficiency     Component  Ref Range & Units 1/22/24  7:32 AM 6/21/22  7:52 AM 6/1/21  3:10 PM   Vit D, 25-Hydroxy  30.0 - 100.0 ng/mL 32.9 32.6 21.6 Low      Continue supplement of 4000 units daily          Encounter for immunization     Patient offered and deferred vaccinations.           Relevant Orders    Pneumococcal Conjugate Vaccine 20-valent (Pcv20)    influenza vaccine, high-dose, PF 0.7 mL (FLUZONE HIGH-DOSE)    Medicare welcome exam - Primary     Lifestyle modification, diet and exercise discussed  Medications discussed and refilled appropriately  Labs discussed and ordered   Depression screening performed  Anxiety screening performed  Urinary Incontinence screening performed   BMI discussed         Chronic pain disorder     Stopped both gabapentin and tramadol           Other Visit Diagnoses       Screening for AAA (abdominal aortic aneurysm)        Relevant Orders    US abdominal aorta screening aaa            Depression Screening and Follow-up Plan: Patient was screened for depression during today's encounter. They screened negative with a PHQ-2 score of 0.    Falls Plan of Care:  Assessed feet and footwear. Home safety education provided.       Preventive health issues were discussed with patient, and age appropriate screening tests were ordered as noted in patient's After Visit Summary.  Personalized health advice and appropriate referrals for health education or preventive services given if needed, as noted in patient's After Visit Summary.     History of Present Illness:     Patient presents for a Medicare Wellness Visit    The patient is here today to discuss his Medicare Annual Wellness Visit. Please continue to the PORCH section of the note for details of today's visit.         Patient Care Team:  FAIZAN Bach as PCP - General (Internal Medicine)     Review of Systems:     Review of Systems   Constitutional:  Negative for activity change, chills, fatigue and fever.   HENT:  Negative for rhinorrhea and sore throat.    Eyes:  Negative for pain.   Respiratory:  Negative for cough and shortness of breath.    Cardiovascular:  Negative for chest pain, palpitations and leg swelling.   Gastrointestinal:  Negative for abdominal pain, constipation, diarrhea, nausea and vomiting.   Genitourinary:  Negative for difficulty urinating, flank pain, frequency and urgency.   Musculoskeletal:  Negative for gait problem, joint swelling and myalgias.   Skin:  Negative for color change.   Neurological:  Negative for dizziness, weakness, light-headedness and headaches.   Psychiatric/Behavioral:  Negative for sleep disturbance. The patient is not nervous/anxious.    All other systems reviewed and are negative.       Problem List:     Patient Active Problem List   Diagnosis    Mixed hyperlipidemia    Erectile dysfunction    Prostate cancer (HCC)    Vitamin D deficiency    Encounter for immunization    Hand arthritis    Gastroesophageal reflux disease without esophagitis    Medicare welcome exam    Chronic pain disorder      Past Medical and Surgical History:     Past Medical History:   Diagnosis  "Date    Arthritis     mostly hands    Bladder stone     Blue baby     at birth /with heart murmur/no longer noted    Cancer (HCC)     Carpal tunnel syndrome     Chronic pain disorder 2/19/2024    \"hands\"    Generalized joint pain     Herniated disc, cervical     some neck pain    History of kidney stones     [place] (hard of hearing)     \"slightly\"    Kidney stone     Moderate exercise     \"very active lifestyle/walking/at least 4 miles per day/active job\"    PONV (postoperative nausea and vomiting)     \"almost every time after surgery\"    Prostate cancer (HCC)     Rosacea     Skin cancer     Tinnitus     Urinary tract infection      Past Surgical History:   Procedure Laterality Date    BACK SURGERY      HERNIATED DISC/Lumbar    CARPAL TUNNEL RELEASE Bilateral     COLONOSCOPY      CT CYSTOGRAM  9/18/2019    FL RETROGRADE PYELOGRAM  9/4/2019    HERNIA REPAIR Right     inguinal    NEUROPLASTY / TRANSPOSITION MEDIAN NERVE AT CARPAL TUNNEL BILATERAL      LA LAPS SURG [id number] RPBIC RAD W/NRV SPARING ROBOT N/A 9/4/2019    Procedure: PROSTATECTOMY RADICAL W ROBOTICS; BILATERAL PELVIC LYMPH NODE DISSECTION LYSIS OF ADHESIONS Robotic; DOUBLE LEFT URETERAL STENT INSERTION with retrograde pyelogram, Instillation of ICG; left upper pole ureteral re-implantation Robotic;  Surgeon: oJse Cruz Ceja MD;  Location: [facility];  Service: Urology    PROSTATE BIOPSY      PROSTATECTOMY      SKIN LESION EXCISION      x2 on nose for melanoma    SKIN TAG REMOVAL      TONSILLECTOMY        Family History:     Family History   Problem Relation Age of Onset    Hypertension Mother     Stroke Father       Social History:     Social History     Socioeconomic History    Marital status: /Civil Union     Spouse name: None    Number of children: None    Years of education: None    Highest education level: None   Occupational History    Occupation: RETIRED   Tobacco Use    Smoking status: Never     Passive exposure: Never    Smokeless " tobacco: Never   Vaping Use    Vaping status: Never Used   Substance and Sexual Activity    Alcohol use: Yes     Alcohol/week: 1.0 standard drink of alcohol     Types: 1 Glasses of wine per week     Comment: occasional wine    Drug use: No    Sexual activity: Yes     Partners: Female   Other Topics Concern    None   Social History Narrative        RETIRED     Social Determinants of Health     Financial Resource Strain: Low Risk  (2/13/2024)    Overall Financial Resource Strain (CARDIA)     Difficulty of Paying Living Expenses: Not hard at all   Food Insecurity: Not on file   Transportation Needs: No Transportation Needs (2/13/2024)    PRAPARE - Transportation     Lack of Transportation (Medical): No     Lack of Transportation (Non-Medical): No   Physical Activity: Not on file   Stress: Not on file   Social Connections: Not on file   Intimate Partner Violence: Not on file   Housing Stability: Not on file      Medications and Allergies:     Current Outpatient Medications   Medication Sig Dispense Refill    atorvastatin (LIPITOR) 20 mg tablet Take 1 tablet (20 mg total) by mouth every evening 90 tablet 3    calcium carbonate (Tums) 500 mg chewable tablet Chew 2 tablets 3 (three) times a day      ezetimibe (ZETIA) 10 mg tablet Take 1 tablet (10 mg total) by mouth daily 90 tablet 3    fenofibrate (TRICOR) 54 MG tablet Take 1 tablet (54 mg total) by mouth daily 90 tablet 3    ibuprofen (MOTRIN) 200 mg tablet Take 200 mg by mouth every 6 (six) hours as needed for mild pain For arthiritis/ pain      meloxicam (Mobic) 15 mg tablet Take 1 tablet (15 mg total) by mouth daily 90 tablet 1    omeprazole (PriLOSEC) 20 mg delayed release capsule Take 1 capsule (20 mg total) by mouth 2 (two) times a day      sildenafil (VIAGRA) 100 mg tablet Take 1 tablet by mouth empty stomach one hour before sex 30 tablet 1    Vitamin D, Cholecalciferol, 50 MCG (2000 UT) CAPS Take 4,000 Units by mouth daily       No current  facility-administered medications for this visit.     Allergies   Allergen Reactions    Ciprofloxacin GI Intolerance    Keflex [Cephalexin] GI Intolerance      Immunizations:     Immunization History   Administered Date(s) Administered    COVID-19 MODERNA VACC 0.5 ML IM 01/21/2021, 02/19/2021, 01/04/2022    Tdap 11/20/2023      Health Maintenance:         Topic Date Due    Colorectal Cancer Screening  08/17/2025    Hepatitis C Screening  Completed         Topic Date Due    Pneumococcal Vaccine: 65+ Years (1 of 1 - PCV) Never done    Influenza Vaccine (1) Never done    COVID-19 Vaccine (4 - 2023-24 season) 09/01/2023      Medicare Screening Tests and Risk Assessments:     Dami is here for his Welcome to Medicare visit.     Health Risk Assessment:   Patient rates overall health as very good. Patient feels that their physical health rating is same. Patient is very satisfied with their life. Eyesight was rated as same. Hearing was rated as same. Patient feels that their emotional and mental health rating is same. Patients states they are never, rarely angry. Patient states they are sometimes unusually tired/fatigued. Pain experienced in the last 7 days has been some. Patient's pain rating has been 6/10. Patient states that he has experienced no weight loss or gain in last 6 months.     Depression Screening:   PHQ-2 Score: 0      Fall Risk Screening:   In the past year, patient has experienced: no history of falling in past year      Home Safety:  Patient does not have trouble with stairs inside or outside of their home. Patient has working smoke alarms and has working carbon monoxide detector. Home safety hazards include: none.     Nutrition:   Current diet is Regular.     Medications:   Patient is not currently taking any over-the-counter supplements. Patient is able to manage medications.     Activities of Daily Living (ADLs)/Instrumental Activities of Daily Living (IADLs):   Walk and transfer into and out of bed  and chair?: Yes  Dress and groom yourself?: Yes    Bathe or shower yourself?: Yes    Feed yourself? Yes  Do your laundry/housekeeping?: Yes  Manage your money, pay your bills and track your expenses?: Yes  Make your own meals?: Yes    Do your own shopping?: Yes    Previous Hospitalizations:   Any hospitalizations or ED visits within the last 12 months?: No      Advance Care Planning:   Living will: Yes    Durable POA for healthcare: No    Advanced directive: Yes    ACP document given: Yes      PREVENTIVE SCREENINGS      Cardiovascular Screening:    General: Screening Not Indicated and History Lipid Disorder      Diabetes Screening:     General: Screening Current      Colorectal Cancer Screening:     General: Screening Current      Prostate Cancer Screening:    General: History Prostate Cancer      Abdominal Aortic Aneurysm (AAA) Screening:    Risk factors include: age between 65-76 yo        Lung Cancer Screening:     General: Screening Not Indicated      Hepatitis C Screening:    General: Screening Current    Screening, Brief Intervention, and Referral to Treatment (SBIRT)    Screening  Typical number of drinks in a day: 0  Typical number of drinks in a week: 0  Interpretation: Low risk drinking behavior.    AUDIT-C Screenin) How often did you have a drink containing alcohol in the past year? monthly or less  2) How many drinks did you have on a typical day when you were drinking in the past year? 0  3) How often did you have 6 or more drinks on one occasion in the past year? never    AUDIT-C Score: 1  Interpretation: Score 0-3 (male): Negative screen for alcohol misuse    Single Item Drug Screening:  How often have you used an illegal drug (including marijuana) or a prescription medication for non-medical reasons in the past year? never    Single Item Drug Screen Score: 0  Interpretation: Negative screen for possible drug use disorder    Other Counseling Topics:   Car/seat belt/driving safety and calcium and  "vitamin D intake and regular weightbearing exercise.     No results found.     Physical Exam:     /82 (BP Location: Right arm, Patient Position: Sitting)   Pulse 71   Temp 97.6 °F (36.4 °C)   Ht 5' 9\" (1.753 m)   Wt 91.6 kg (202 lb)   SpO2 98%   BMI 29.83 kg/m²     Physical Exam  Vitals and nursing note reviewed.   Constitutional:       General: He is awake.      Appearance: Normal appearance. He is well-developed and normal weight.   HENT:      Head: Normocephalic and atraumatic.      Nose: Nose normal.      Mouth/Throat:      Mouth: Mucous membranes are moist.   Eyes:      Conjunctiva/sclera: Conjunctivae normal.   Cardiovascular:      Rate and Rhythm: Normal rate and regular rhythm.      Pulses: Normal pulses.      Heart sounds: Normal heart sounds. No murmur heard.  Pulmonary:      Effort: Pulmonary effort is normal. No respiratory distress.      Breath sounds: Normal breath sounds.   Abdominal:      General: Bowel sounds are normal.      Palpations: Abdomen is soft.      Tenderness: There is no abdominal tenderness.   Musculoskeletal:      Cervical back: Neck supple.      Right lower leg: No edema.      Left lower leg: No edema.   Skin:     General: Skin is warm and dry.   Neurological:      Mental Status: He is alert and oriented to person, place, and time.   Psychiatric:         Attention and Perception: Attention normal.         Mood and Affect: Mood normal.         Speech: Speech normal.         Behavior: Behavior normal. Behavior is cooperative.          FAIZAN Bach  "

## 2024-02-21 PROBLEM — Z00.00 MEDICARE WELCOME EXAM: Status: RESOLVED | Noted: 2024-02-19 | Resolved: 2024-02-21

## 2024-02-27 ENCOUNTER — OFFICE VISIT (OUTPATIENT)
Dept: UROLOGY | Facility: CLINIC | Age: 69
End: 2024-02-27
Payer: MEDICARE

## 2024-02-27 VITALS
WEIGHT: 203 LBS | HEART RATE: 63 BPM | OXYGEN SATURATION: 98 % | SYSTOLIC BLOOD PRESSURE: 144 MMHG | HEIGHT: 69 IN | BODY MASS INDEX: 30.07 KG/M2 | DIASTOLIC BLOOD PRESSURE: 80 MMHG

## 2024-02-27 DIAGNOSIS — C61 PROSTATE CANCER (HCC): Primary | ICD-10-CM

## 2024-02-27 PROCEDURE — 99214 OFFICE O/P EST MOD 30 MIN: CPT | Performed by: UROLOGY

## 2024-02-27 NOTE — PROGRESS NOTES
ASSESSMENT:     68 y.o. 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  PSMA PET scan is negative for metastatic disease.  Patient had stage IIIa disease with positive margins.  There may be some local pelvic disease.  Question will be whether this is indolent benign tissue with a slow incremental PSA rise or alternatively would begin to see exponential growth concerning for cancer recurrence.  At this point, I would not aggressively recommend radiation based therapy.  Could consider if continued rise in the future.    Patient will have a PSA in 3 months we will have a conversation to discuss.    Patient is having some benefit with sildenafil and vacuum erection device with occlusive rings.  This works out 50% of the time he does have headaches from the Viagra.  Has used Cialis in the past.  Information provided today regarding intracavernosal injections.  Patient will contact me if he is interested      --        UROLOGY FOLLOWUP NOTE     CHIEF COMPLAINT   Dami Whitman is a 68 y.o. male with a complaint of   No chief complaint on file.    History of Present Illness:     68 y.o. male who recently underwent PSA testing an outside urologist PSA was elevated at 10.  Repeat PSA karen to 13.  Patient denies any significant urinary symptoms of infection or inflammation.      AUA SS 7, LYLE 19  Used Viagra 100 mg preoperatively    Patient underwent his robotic assisted laparoscopic prostatectomy with bilateral pelvic lymph node dissection.  Patient did undergo nerve-sparing with athermal technique  "on the bilateral pedicles.  During the course of his surgery, an abnormal structure was identified emanating off the tip of the left seminal vesicle.  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  This required robotic reimplantation of the upper pole moiety into the bladder.  The patient has stents in both the upper and lower pole moiety of his left kidney.    Patient last seen by me in 2020.  Has been followed by the advanced practitioner service thereafter.  Given recent increase in his PSA, not yet 0.2, PSMA PET scan was recommended.  Returns to discuss results.    Patient is not having any incontinence.  Using sildenafil and vacuum erection device for erections which work about 50% of the time to his satisfaction.                Component  Ref Range & Units 1/22/24  7:32 AM 12/14/22  8:33 AM 9/19/22  7:56 AM 6/21/22  7:52 AM 12/17/21  8:15 AM 6/16/21  7:46 AM 12/4/20  8:23 AM   PSA, Ultrasensitive  0.000 - 4.000 ng/mL 0.174 0.085 CM 0.095 CM 0.105 CM 0.043 CM 0.043 CM             Past Medical History:     Past Medical History:   Diagnosis Date    Arthritis     mostly hands    Bladder stone     Blue baby     at birth /with heart murmur/no longer noted    Cancer (HCC)     Carpal tunnel syndrome     Chronic pain disorder 2/19/2024    \"hands\"    Generalized joint pain     Herniated disc, cervical     some neck pain    History of kidney stones     [place] (hard of hearing)     \"slightly\"    Kidney stone     Moderate exercise     \"very active lifestyle/walking/at least 4 miles per day/active job\"    PONV (postoperative nausea and vomiting)     \"almost every time after surgery\"    Prostate cancer (HCC)     Rosacea     Skin cancer     Tinnitus     Urinary tract infection        PAST SURGICAL HISTORY:     Past Surgical History:   Procedure Laterality Date    BACK SURGERY      HERNIATED DISC/Lumbar    CARPAL TUNNEL RELEASE Bilateral "     COLONOSCOPY      CT CYSTOGRAM  9/18/2019    FL RETROGRADE PYELOGRAM  9/4/2019    HERNIA REPAIR Right     inguinal    NEUROPLASTY / TRANSPOSITION MEDIAN NERVE AT CARPAL TUNNEL BILATERAL      ID LAPS SURG [id number] RPBIC RAD W/NRV SPARING ROBOT N/A 9/4/2019    Procedure: PROSTATECTOMY RADICAL W ROBOTICS; BILATERAL PELVIC LYMPH NODE DISSECTION LYSIS OF ADHESIONS Robotic; DOUBLE LEFT URETERAL STENT INSERTION with retrograde pyelogram, Instillation of ICG; left upper pole ureteral re-implantation Robotic;  Surgeon: Jose Cruz Ceja MD;  Location: [facility];  Service: Urology    PROSTATE BIOPSY      PROSTATECTOMY      SKIN LESION EXCISION      x2 on nose for melanoma    SKIN TAG REMOVAL      TONSILLECTOMY         CURRENT MEDICATIONS:     Current Outpatient Medications   Medication Sig Dispense Refill    atorvastatin (LIPITOR) 20 mg tablet Take 1 tablet (20 mg total) by mouth every evening 90 tablet 3    calcium carbonate (Tums) 500 mg chewable tablet Chew 2 tablets 3 (three) times a day      ibuprofen (MOTRIN) 200 mg tablet Take 200 mg by mouth every 6 (six) hours as needed for mild pain For arthiritis/ pain      meloxicam (Mobic) 15 mg tablet Take 1 tablet (15 mg total) by mouth daily 90 tablet 1    omeprazole (PriLOSEC) 20 mg delayed release capsule Take 1 capsule (20 mg total) by mouth 2 (two) times a day      sildenafil (VIAGRA) 100 mg tablet Take 1 tablet by mouth empty stomach one hour before sex 30 tablet 1    Vitamin D, Cholecalciferol, 50 MCG (2000 UT) CAPS Take 4,000 Units by mouth daily      ezetimibe (ZETIA) 10 mg tablet Take 1 tablet (10 mg total) by mouth daily (Patient not taking: Reported on 2/27/2024) 90 tablet 3    fenofibrate (TRICOR) 54 MG tablet Take 1 tablet (54 mg total) by mouth daily (Patient not taking: Reported on 2/27/2024) 90 tablet 3     No current facility-administered medications for this visit.       ALLERGIES:     Allergies   Allergen Reactions    Ciprofloxacin GI Intolerance     "Keflex [Cephalexin] GI Intolerance       SOCIAL HISTORY:     Social History     Socioeconomic History    Marital status: /Civil Union     Spouse name: None    Number of children: None    Years of education: None    Highest education level: None   Occupational History    Occupation: RETIRED   Tobacco Use    Smoking status: Never     Passive exposure: Never    Smokeless tobacco: Never   Vaping Use    Vaping status: Never Used   Substance and Sexual Activity    Alcohol use: Yes     Alcohol/week: 1.0 standard drink of alcohol     Types: 1 Glasses of wine per week     Comment: occasional wine    Drug use: No    Sexual activity: Yes     Partners: Female   Other Topics Concern    None   Social History Narrative        RETIRED     Social Determinants of Health     Financial Resource Strain: Low Risk  (2/13/2024)    Overall Financial Resource Strain (CARDIA)     Difficulty of Paying Living Expenses: Not hard at all   Food Insecurity: Not on file   Transportation Needs: No Transportation Needs (2/13/2024)    PRAPARE - Transportation     Lack of Transportation (Medical): No     Lack of Transportation (Non-Medical): No   Physical Activity: Not on file   Stress: Not on file   Social Connections: Not on file   Intimate Partner Violence: Not on file   Housing Stability: Not on file       SOCIAL HISTORY:     Family History   Problem Relation Age of Onset    Hypertension Mother     Stroke Father        REVIEW OF SYSTEMS:     Review of Systems   Constitutional: Negative.    Respiratory: Negative.     Cardiovascular: Negative.    Gastrointestinal: Negative.    Genitourinary: Negative.  Negative for enuresis.   Musculoskeletal: Negative.    Skin: Negative.    Psychiatric/Behavioral: Negative.           PHYSICAL EXAM:     /80   Pulse 63   Ht 5' 9\" (1.753 m)   Wt 92.1 kg (203 lb)   SpO2 98%   BMI 29.98 kg/m²     General:  Healthy appearing male in no acute distress.  They have a normal affect.  There is not " appear to be any gross neurologic defects or abnormalities.  HEENT:  Normocephalic, atraumatic.  Neck is supple without any palpable lymphadenopathy  Cardiovascular:  Patient has normal palpable distal radial pulses.  There is no significant peripheral edema. No JVD is noted.  Respiratory:  Patient has unlabored respirations.  There is no audible wheeze or rhonchi.  Abdomen:  Abdomen is   With healed surgical scars.  Abdomen is soft and nontender.  There is no tympany.  Inguinal and umbilical hernia are not appreciated.  Musculoskeletal:  Patient does not have significant CVA tenderness in the  flank with palpation or percussion.  They full range of motion in all 4 extremities.  Strength in all 4 extremities appears congruent.  Patient is able to ambulate without assistance or difficulty.  Dermatologic:  Patient has no skin abnormalities or rashes.      LABS:     CBC:   Lab Results   Component Value Date    WBC 6.38 2024    HGB 15.5 2024    HCT 46.7 2024    MCV 92 2024     2024       BMP:   Lab Results   Component Value Date    CALCIUM 9.4 2024    K 4.5 2024    CO2 29 2024     2024    BUN 17 2024    CREATININE 1.01 2024     Lab Results   Component Value Date    PSA 0.09 2023    PSA 13.4 (H) 2019    PSA 10.1 (H) 2019     10/18/19 8:33 AM     PSA, Ultrasensitive  0.000 - 4.000 ng/mL  0.026      20 7:50 AM     PSA, Ultrasensitive  0.000 - 4.000 ng/mL  0.020      20 7:58 AM     PSA, Ultrasensitive  0.000 - 4.000 ng/mL  0.015      20 7:54 AM     PSA, Ultrasensitive  0.000 - 4.000 ng/mL  0.024      IMAGIN/19/24  PYLARIFY PSMA PET/CT SCAN     INDICATION:  C61: Malignant neoplasm of prostate  R97.21: Rising PSA following treatment for malignant neoplasm of prostate   , restaging     MODIFIER: PS     COMPARISON: CT 2019     CELL TYPE:  prostatic adenocarcinoma     TECHNIQUE:   9.3 mCi F-18 Pylarify  PSMA administered IV. Multiplanar attenuation corrected and non attenuation corrected PET images were acquired 60 minutes post injection. Contiguous, low dose, axial CT sections were obtained from the vertex through the   femurs .   Intravenous or oral contrast was not utilized.  This examination, like all CT scans performed in the [facility], was performed utilizing techniques to minimize radiation dose exposure, including the use of iterative   reconstruction and automated exposure control.     FINDINGS:     VISUALIZED BRAIN:  No acute abnormalities are seen.     HEAD/NECK:  There is a physiologic distribution of the radiotracer. No PSMA avid cervical adenopathy is seen.  CT images: Unremarkable.     CHEST:  No PSMA avid soft tissue lesions are seen.  CT images: Coronary atherosclerosis. Ectatic ascending thoracic aorta measuring 4 cm.     ABDOMEN:  No PSMA avid soft tissue lesions are seen.  CT images: Unremarkable.     PELVIS:  No PSMA avid soft tissue lesions are seen.  CT images: Small fat-containing left inguinal hernia.     OSSEOUS STRUCTURES:  No PSMA avid lesions are seen.  CT images: Spine degenerative change.     IMPRESSION:  1. No PSMA avid metastases visualized at this time. Consider follow-up exam as clinically warranted.  2. Ectatic ascending thoracic aorta. 12-month noncontrast CT chest follow-up recommended.    1/29/20  RENAL ULTRASOUND     INDICATION:   C61: Malignant neoplasm of prostate  Q62.5: Duplication of ureter.     COMPARISON: 9/24/2019; 9/18/2019     TECHNIQUE:   Ultrasound of the retroperitoneum was performed with a curvilinear transducer utilizing volumetric sweeps and still imaging techniques.      FINDINGS:     KIDNEYS:  Symmetric and normal size.  Right kidney:  11.8 cm.  Left kidney:  12.4 cm.     Right kidney  Normal echogenicity and contour.   No suspicious masses detected.   No hydronephrosis.  No shadowing calculi.  No perinephric fluid collections.     Left  kidney  Normal echogenicity and contour.   No suspicious masses detected.   No hydronephrosis.  No shadowing calculi.  No perinephric fluid collections.     URETERS:  Nonvisualized.     BLADDER:   Normally distended.  No focal thickening or mass lesions.           IMPRESSION:     Normal.     PATHOLOGY:     9/4/19  Addendum 2   C. CK AE1/3 is negative in a lymph node.  The original diagnosis is UNCHANGED.   Addendum electronically signed by Yumiko Kingsley MD on 9/9/2019 at  2:20 PM   Addendum   Intradepartmental consultation concurs with the diagnosis of prostatic adenocarcinoma.    Addendum electronically signed by Yumiko Kingsley MD on 9/9/2019 at 10:30 AM   Final Diagnosis   A. Dorsal vein margin (excision):  - Prostatic adenocarcinoma extending to tissue edges     B. Periprostatic fat and lymph nodes (excision):  - Fibroadipose tissue  - No carcinoma identified      C. Left pelvic lymph node (excision):  - One (1) lymph node negative for carcinoma (0/1)     D. Right pelvic lymph node (excision):  - One (1) lymph node negative for carcinoma (0/1)     E. Prostate (radical prostatectomy):  - Prostatic adenocarcinoma, Larry score 3+4=7   - Calcified nodules   - See staging protocol below (pT3aN0)   Electronically signed by Yumiko Kingsley MD on 9/8/2019 at  5:10 PM   Additional Information    All controls performed with the immunohistochemical stains reported above reacted appropriately.  These tests were developed and their performance characteristics determined by [facility] or [facility]. They may not be cleared or approved by the U.S. Food and Drug Administration. The FDA has determined that such clearance or approval is not necessary. These tests are used for clinical purposes. They should not be regarded as investigational or for research. This laboratory has been approved by CLIA 88, designated as a high-complexity laboratory and is qualified to perform these tests.   Synoptic  Checklist   PROSTATE GLAND: Radical Prostatectomy   Prostate Res - All Specimens   SPECIMEN   Procedure  Radical prostatectomy    Prostate Size     Prostate Weight (g)  46 g   Prostate Greatest Dimension in Centimeters (cm)  4.9 Centimeters (cm)   Additional Dimension in Centimeters (cm)  4.4 Centimeters (cm)     3.9 Centimeters (cm)   TUMOR   Histologic Type  Acinar adenocarcinoma    Histologic Grade     [place] Pattern     Percentage of Pattern 4  20 %   Primary [place] Pattern  Pattern 3    Secondary Larry Pattern  Pattern 4    Tertiary [place] Pattern  Not applicable    Grade Group  2    Intraductal Carcinoma (IDC)  Not identified    Tumor Extent     Tumor Quantitation  Estimated percentage of prostate involved by tumor: 40 %   Extraprostatic Extension (EPE)  Present, nonfocal    Location of Extraprostatic Extension  dorsal vein margin     Seminal Vesicle Invasion  Not identified    Accessory Findings     Treatment Effect  No known presurgical therapy    Lymphovascular Invasion  Not Identified    Perineural Invasion  Present    MARGINS   Margins  Involved by invasive carcinoma      Non-limited (>= 3 mm)    Linear Length of Positive Margin(s) in Millimeters (mm)  15 Millimeters (mm)   Focality  Multifocal    Location of Positive Margin(s)  Right apical      Right anterior      Left apical      Left anterior    Margin Positivity in Area of Extraprostatic Extension (EPE)  Present    Location(s)  doral vein margin     [place] Pattern at Positive Margin(s)  Pattern 4 or 5    LYMPH NODES   Number of Lymph Nodes Involved  0    Number of Lymph Nodes Examined  2    PATHOLOGIC STAGE CLASSIFICATION (pTNM, AJCC 8th Edition)   TNM Descriptors  Not applicable    Primary Tumor (pT)  pT3a    Regional Lymph Nodes (pN)  pN0    Distant Metastasis (pM)  Not applicable - pM cannot be determined from the submitted specimen(s)    ADDITIONAL FINDINGS   Additional Pathologic Findings  High-grade prostatic intraepithelial neoplasia  (PIN)    .

## 2024-04-30 DIAGNOSIS — E78.2 MIXED HYPERLIPIDEMIA: ICD-10-CM

## 2024-04-30 RX ORDER — ATORVASTATIN CALCIUM 20 MG/1
20 TABLET, FILM COATED ORAL EVERY EVENING
Qty: 90 TABLET | Refills: 3 | Status: SHIPPED | OUTPATIENT
Start: 2024-04-30

## 2024-05-21 ENCOUNTER — APPOINTMENT (OUTPATIENT)
Dept: LAB | Facility: CLINIC | Age: 69
End: 2024-05-21
Payer: MEDICARE

## 2024-05-21 DIAGNOSIS — C61 PROSTATE CANCER (HCC): ICD-10-CM

## 2024-05-21 DIAGNOSIS — E78.2 MIXED HYPERLIPIDEMIA: ICD-10-CM

## 2024-05-21 LAB
CHOLEST SERPL-MCNC: 155 MG/DL
HDLC SERPL-MCNC: 46 MG/DL
LDLC SERPL CALC-MCNC: 95 MG/DL (ref 0–100)
NONHDLC SERPL-MCNC: 109 MG/DL
PSA SERPL-MCNC: 0.18 NG/ML (ref 0–4)
TRIGL SERPL-MCNC: 69 MG/DL

## 2024-05-21 PROCEDURE — 84153 ASSAY OF PSA TOTAL: CPT

## 2024-05-21 PROCEDURE — 36415 COLL VENOUS BLD VENIPUNCTURE: CPT

## 2024-05-21 PROCEDURE — 80061 LIPID PANEL: CPT

## 2024-05-28 ENCOUNTER — TELEPHONE (OUTPATIENT)
Dept: UROLOGY | Facility: CLINIC | Age: 69
End: 2024-05-28

## 2024-05-28 ENCOUNTER — TELEPHONE (OUTPATIENT)
Dept: INTERNAL MEDICINE CLINIC | Facility: CLINIC | Age: 69
End: 2024-05-28

## 2024-05-28 DIAGNOSIS — C61 PROSTATE CANCER (HCC): Primary | ICD-10-CM

## 2024-05-28 NOTE — TELEPHONE ENCOUNTER
I spoke to Don regarding his PSA.  Prior PSA was 0.174 in January, now 0.18.  This is not yet met 0.2 cutoff for biochemical recurrence and prior PSMA PET scan was negative for active metastasis.  Patient and I agree to continue to observe conservatively, next PSA in 3 months and again in 6 months with physical visit.  If and when the PSA rises above 0.2, patient could be offered a referral to radiation oncology for discussion of salvage therapy.

## 2024-05-28 NOTE — TELEPHONE ENCOUNTER
----- Message from FAIZAN [place] sent at 5/27/2024  4:51 PM EDT -----  Let him know that his lipid panel has significantly improved!

## 2024-05-28 NOTE — TELEPHONE ENCOUNTER
Called and spoke with Dami. Patient aware to have PSA completed in 3 months, then another PSA in 6 months with office visit at that time. Offered appointment in the [place] or [place] offices, however patient wishes to stay with VASU Savage at the [facility] as she is familiar with his history. Patient scheduled for December follow up with Janette. Date, time, and location confirmed.

## 2024-08-07 ENCOUNTER — RA CDI HCC (OUTPATIENT)
Dept: OTHER | Facility: HOSPITAL | Age: 69
End: 2024-08-07

## 2024-08-20 ENCOUNTER — OFFICE VISIT (OUTPATIENT)
Dept: INTERNAL MEDICINE CLINIC | Facility: CLINIC | Age: 69
End: 2024-08-20
Payer: MEDICARE

## 2024-08-20 VITALS
HEIGHT: 69 IN | TEMPERATURE: 97.4 F | SYSTOLIC BLOOD PRESSURE: 142 MMHG | HEART RATE: 63 BPM | OXYGEN SATURATION: 97 % | BODY MASS INDEX: 30.33 KG/M2 | DIASTOLIC BLOOD PRESSURE: 88 MMHG | WEIGHT: 204.8 LBS

## 2024-08-20 DIAGNOSIS — Z23 ENCOUNTER FOR IMMUNIZATION: ICD-10-CM

## 2024-08-20 DIAGNOSIS — E55.9 VITAMIN D DEFICIENCY: Chronic | ICD-10-CM

## 2024-08-20 DIAGNOSIS — E78.2 MIXED HYPERLIPIDEMIA: Chronic | ICD-10-CM

## 2024-08-20 DIAGNOSIS — N52.01 ERECTILE DYSFUNCTION DUE TO ARTERIAL INSUFFICIENCY: Chronic | ICD-10-CM

## 2024-08-20 DIAGNOSIS — K21.9 GASTROESOPHAGEAL REFLUX DISEASE WITHOUT ESOPHAGITIS: Chronic | ICD-10-CM

## 2024-08-20 DIAGNOSIS — C61 PROSTATE CANCER (HCC): Chronic | ICD-10-CM

## 2024-08-20 DIAGNOSIS — Z00.00 MEDICARE ANNUAL WELLNESS VISIT, SUBSEQUENT: ICD-10-CM

## 2024-08-20 DIAGNOSIS — Z13.1 SCREENING FOR DIABETES MELLITUS: ICD-10-CM

## 2024-08-20 DIAGNOSIS — M19.049 HAND ARTHRITIS: Primary | Chronic | ICD-10-CM

## 2024-08-20 PROBLEM — N52.9 ERECTILE DYSFUNCTION: Chronic | Status: ACTIVE | Noted: 2018-09-21

## 2024-08-20 PROCEDURE — 99214 OFFICE O/P EST MOD 30 MIN: CPT | Performed by: NURSE PRACTITIONER

## 2024-08-20 PROCEDURE — G2211 COMPLEX E/M VISIT ADD ON: HCPCS | Performed by: NURSE PRACTITIONER

## 2024-08-20 RX ORDER — HYDROXYCHLOROQUINE SULFATE 200 MG/1
200 TABLET, FILM COATED ORAL 2 TIMES DAILY WITH MEALS
Qty: 60 TABLET | Refills: 0 | Status: SHIPPED | OUTPATIENT
Start: 2024-08-20 | End: 2024-09-19

## 2024-08-20 NOTE — PROGRESS NOTES
"Ambulatory Visit  Name: Dami Whitman      : 1955      MRN: [id number]  Encounter Provider: FAIZAN Bach  Encounter Date: 2024   Encounter department: [facility]    Assessment & Plan   1. Hand arthritis  Assessment & Plan:  Tops of bilateral hands  Knuckles are sore  Right hand worse than Left hand  Hard to hold a cup of coffee  Has to \"sit on his hands\" or \"keep pressure on his fingers\" to help alleviate the pain   Hard to hold coffee cups  He has tried:  Gabapentin and tramadol  Motrin  Mobic  Will try plaquenil and monitor him for improvement   We discussed prednisone too   Orders:  -     hydroxychloroquine (Plaquenil) 200 mg tablet; Take 1 tablet (200 mg total) by mouth 2 (two) times a day with meals  2. Encounter for immunization  -     Pneumococcal Conjugate Vaccine 20-valent (Pcv20)  3. Gastroesophageal reflux disease without esophagitis  Assessment & Plan:  This is a chronic and stable disease process.   Continue omeprazole and tums   4. Prostate cancer (HCC)  5. Vitamin D deficiency  -     Vitamin D 25 hydroxy; Future; Expected date: 02/10/2025  6. Mixed hyperlipidemia  Assessment & Plan:  This is a chronic and stable disease process.   2024  Has been on lipitor 20mg  Will stop the lipitor due to his hand and leg muscle aches  Will start him on zetia and fenofibrate   2024    Orders:  -     Lipid panel; Future; Expected date: 02/10/2025  7. Erectile dysfunction due to arterial insufficiency  Assessment & Plan:  This is a chronic and stable disease process.   Continue viagra   8. Medicare annual wellness visit, subsequent  -     Comprehensive metabolic panel; Future; Expected date: 02/10/2025  -     CBC and differential; Future; Expected date: 02/10/2025  9. Screening for diabetes mellitus  -     Hemoglobin A1C; Future; Expected date: 02/10/2025       History of Present Illness     The patient is here today to discuss his continued bilateral hand " pain. Please continue to the PORCH section of the note for details of today's visit.          Review of Systems   Constitutional:  Negative for activity change, chills, fatigue and fever.   HENT:  Negative for rhinorrhea and sore throat.    Eyes:  Negative for pain.   Respiratory:  Negative for cough and shortness of breath.    Cardiovascular:  Negative for chest pain, palpitations and leg swelling.   Gastrointestinal:  Negative for abdominal pain, constipation, diarrhea, nausea and vomiting.   Genitourinary:  Negative for difficulty urinating, flank pain, frequency and urgency.   Musculoskeletal:  Positive for arthralgias, joint swelling and myalgias. Negative for gait problem.   Skin:  Negative for color change.   Neurological:  Negative for dizziness, weakness, light-headedness and headaches.   Psychiatric/Behavioral:  Negative for sleep disturbance. The patient is not nervous/anxious.    All other systems reviewed and are negative.    Current Outpatient Medications on File Prior to Visit   Medication Sig Dispense Refill   • atorvastatin (LIPITOR) 20 mg tablet Take 1 tablet (20 mg total) by mouth every evening 90 tablet 3   • calcium carbonate (Tums) 500 mg chewable tablet Chew 2 tablets 3 (three) times a day     • ibuprofen (MOTRIN) 200 mg tablet Take 200 mg by mouth every 6 (six) hours as needed for mild pain For arthiritis/ pain     • sildenafil (VIAGRA) 100 mg tablet Take 1 tablet by mouth empty stomach one hour before sex 30 tablet 1   • Vitamin D, Cholecalciferol, 50 MCG (2000 UT) CAPS Take 4,000 Units by mouth daily     • [DISCONTINUED] ezetimibe (ZETIA) 10 mg tablet Take 1 tablet (10 mg total) by mouth daily (Patient not taking: Reported on 2/27/2024) 90 tablet 3   • [DISCONTINUED] fenofibrate (TRICOR) 54 MG tablet Take 1 tablet (54 mg total) by mouth daily (Patient not taking: Reported on 2/27/2024) 90 tablet 3   • [DISCONTINUED] meloxicam (Mobic) 15 mg tablet Take 1 tablet (15 mg total) by mouth daily  "90 tablet 1   • [DISCONTINUED] omeprazole (PriLOSEC) 20 mg delayed release capsule Take 1 capsule (20 mg total) by mouth 2 (two) times a day       No current facility-administered medications on file prior to visit.      Objective     /88 (BP Location: Right arm, Patient Position: Sitting, Cuff Size: Adult)   Pulse 63   Temp (!) 97.4 °F (36.3 °C) (Tympanic)   Ht 5' 9\" (1.753 m)   Wt 92.9 kg (204 lb 12.8 oz)   SpO2 97%   BMI 30.24 kg/m²     Physical Exam  Vitals and nursing note reviewed.   Constitutional:       General: He is awake.      Appearance: Normal appearance. He is well-developed.   HENT:      Head: Normocephalic and atraumatic.      Nose: Nose normal.      Mouth/Throat:      Mouth: Mucous membranes are moist.   Eyes:      Conjunctiva/sclera: Conjunctivae normal.   Cardiovascular:      Rate and Rhythm: Normal rate and regular rhythm.      Pulses: Normal pulses.      Heart sounds: Normal heart sounds. No murmur heard.  Pulmonary:      Effort: Pulmonary effort is normal. No respiratory distress.      Breath sounds: Normal breath sounds.   Abdominal:      General: Bowel sounds are normal.      Palpations: Abdomen is soft.      Tenderness: There is no abdominal tenderness.   Musculoskeletal:        Hands:       Cervical back: Neck supple.      Right lower leg: No edema.      Left lower leg: No edema.      Comments: Right hand worse than Left hand  Hard to hold a cup of coffee  Knuckles hurt  Has to \"sit on his hands\" or \"keep pressure on his fingers\" to help alleviate the pain    Skin:     General: Skin is warm and dry.   Neurological:      Mental Status: He is alert and oriented to person, place, and time.      Motor: Motor function is intact.      Coordination: Coordination is intact.      Gait: Gait is intact.   Psychiatric:         Attention and Perception: Attention normal.         Mood and Affect: Mood normal.         Speech: Speech normal.         Behavior: Behavior normal. Behavior is " cooperative.         Thought Content: Thought content normal.         Cognition and Memory: Cognition normal.         Judgment: Judgment normal.       Administrative Statements   I have spent a total time of 30 minutes in caring for this patient on the day of the visit/encounter including Diagnostic results, Prognosis, Risks and benefits of tx options, Instructions for management, Patient and family education, Importance of tx compliance, Risk factor reductions, Impressions, Counseling / Coordination of care, Documenting in the medical record, Reviewing / ordering tests, medicine, procedures  , and Obtaining or reviewing history  .

## 2024-08-20 NOTE — ASSESSMENT & PLAN NOTE
"Tops of bilateral hands  Knuckles are sore  Right hand worse than Left hand  Hard to hold a cup of coffee  Has to \"sit on his hands\" or \"keep pressure on his fingers\" to help alleviate the pain   Hard to hold coffee cups  He has tried:  Gabapentin and tramadol  Motrin  Mobic  Will try plaquenil and monitor him for improvement   We discussed prednisone too   "

## 2024-08-20 NOTE — ASSESSMENT & PLAN NOTE
This is a chronic and stable disease process.   2/20/2024  Has been on lipitor 20mg  Will stop the lipitor due to his hand and leg muscle aches  Will start him on zetia and fenofibrate   8/20/2024

## 2024-08-20 NOTE — PATIENT INSTRUCTIONS
_________________________________________________________________  YOU ARE DUE FOR YOUR NEXT MEDICARE ANNUAL WELLNESS EXAM AFTER 2/20/2025.  REPEAT LABS ORDERED, PLEASE HAVE THEM DRAWN THE WEEK PRIOR TO YOUR VISIT (APPROXIMATELY 2/10/2025).  LABS HAVE BEEN ORDERED FOR YOU.   THESE LABS SHOULD BE DRAWN PRIOR TO YOUR NEXT VISIT.  YOU CAN HAVE THEM DRAWN UP TO 1 WEEK PRIOR TO YOUR NEXT VISIT.  HAVING YOUR BLOOD WORK WHEN YOU COME TO YOUR NEXT VISIT WILL ALLOW ME TO PROVIDE THE BEST MEDICAL CARE FOR YOU WITHOUT HAVING EITHER OF US PERFORM MORE WORK THAN NECESSARY.  PLEASE BE COMPLIANT WITH THIS REQUEST.   _____________________________________________________________________

## 2024-09-13 ENCOUNTER — OFFICE VISIT (OUTPATIENT)
Dept: URGENT CARE | Facility: CLINIC | Age: 69
End: 2024-09-13
Payer: MEDICARE

## 2024-09-13 ENCOUNTER — APPOINTMENT (OUTPATIENT)
Dept: RADIOLOGY | Facility: CLINIC | Age: 69
End: 2024-09-13
Payer: MEDICARE

## 2024-09-13 VITALS
OXYGEN SATURATION: 99 % | HEART RATE: 64 BPM | TEMPERATURE: 98.2 F | RESPIRATION RATE: 18 BRPM | SYSTOLIC BLOOD PRESSURE: 160 MMHG | DIASTOLIC BLOOD PRESSURE: 77 MMHG

## 2024-09-13 DIAGNOSIS — S93.402A SPRAIN OF LEFT ANKLE, UNSPECIFIED LIGAMENT, INITIAL ENCOUNTER: Primary | ICD-10-CM

## 2024-09-13 DIAGNOSIS — M25.572 LEFT ANKLE PAIN, UNSPECIFIED CHRONICITY: ICD-10-CM

## 2024-09-13 PROCEDURE — 99214 OFFICE O/P EST MOD 30 MIN: CPT | Performed by: PHYSICAL MEDICINE & REHABILITATION

## 2024-09-13 PROCEDURE — G0463 HOSPITAL OUTPT CLINIC VISIT: HCPCS | Performed by: PHYSICAL MEDICINE & REHABILITATION

## 2024-09-13 PROCEDURE — 73610 X-RAY EXAM OF ANKLE: CPT

## 2024-09-13 NOTE — PATIENT INSTRUCTIONS
Rest area  Apply ice 15-20 minutes at a time, 3-4x a day as needed  Compress with light ace wrap  Elevate on 1-2 pillows as needed  May take tylenol/ibuprofen for pain as needed

## 2024-09-13 NOTE — PROGRESS NOTES
[facility]        NAME: Dami Whitman is a 69 y.o. male  : 1955    MRN: [id number]  DATE: 2024  TIME: 4:34 PM    Assessment and Plan   Sprain of left ankle, unspecified ligament, initial encounter [S93.402A]  1. Sprain of left ankle, unspecified ligament, initial encounter        2. Left ankle pain, unspecified chronicity  XR ankle 3+ vw left        Xray negative for acute fracture  Pending final read with radiology      Patient Instructions     Rest area  Apply ice 15-20 minutes at a time, 3-4x a day as needed  Compress with light ace wrap  Elevate on 1-2 pillows as needed  May take tylenol/ibuprofen for pain as needed    Follow up with PCP in 3-5 days.  Proceed to  ER if symptoms worsen.    If tests are performed, our office will contact you with results only if changes need to made to the care plan discussed with you at the visit. You can review your full results on [facility].    Chief Complaint     Chief Complaint   Patient presents with    Ankle Pain     Left rolled today          History of Present Illness       Patient presenting with left ankle pain after he rolled the ankle today 24. He states he was walking along a concrete sidewalk when he stepped down incorrectly and rolled the ankle outwards. He states he is able to ambulate on the leg but with discomfort. He admits the pain is radiating to the whole foot. He denies numbness or tingling. He applied ice after the incident.    Ankle Pain         Review of Systems   Review of Systems   Constitutional: Negative.    Respiratory: Negative.     Cardiovascular: Negative.    Musculoskeletal:  Positive for arthralgias and myalgias.   Skin: Negative.    Neurological: Negative.          Current Medications       Current Outpatient Medications:     atorvastatin (LIPITOR) 20 mg tablet, Take 1 tablet (20 mg total) by mouth every evening, Disp: 90 tablet, Rfl: 3    calcium carbonate (Tums) 500 mg chewable tablet,  "Chew 2 tablets 3 (three) times a day, Disp: , Rfl:     hydroxychloroquine (Plaquenil) 200 mg tablet, Take 1 tablet (200 mg total) by mouth 2 (two) times a day with meals, Disp: 60 tablet, Rfl: 0    ibuprofen (MOTRIN) 200 mg tablet, Take 200 mg by mouth every 6 (six) hours as needed for mild pain For arthiritis/ pain, Disp: , Rfl:     sildenafil (VIAGRA) 100 mg tablet, Take 1 tablet by mouth empty stomach one hour before sex, Disp: 30 tablet, Rfl: 1    Vitamin D, Cholecalciferol, 50 MCG (2000 UT) CAPS, Take 4,000 Units by mouth daily, Disp: , Rfl:     Current Allergies     Allergies as of 09/13/2024 - Reviewed 09/13/2024   Allergen Reaction Noted    Ciprofloxacin GI Intolerance 06/10/2019    Keflex [cephalexin] GI Intolerance 06/10/2019            The following portions of the patient's history were reviewed and updated as appropriate: allergies, current medications, past family history, past medical history, past social history, past surgical history and problem list.     Past Medical History:   Diagnosis Date    Arthritis     mostly hands    Bladder stone     Blue baby     at birth /with heart murmur/no longer noted    Cancer (HCC)     Carpal tunnel syndrome     Chronic pain disorder 2/19/2024    \"hands\"    Generalized joint pain     Herniated disc, cervical     some neck pain    History of kidney stones     [place] (hard of hearing)     \"slightly\"    Kidney stone     Moderate exercise     \"very active lifestyle/walking/at least 4 miles per day/active job\"    PONV (postoperative nausea and vomiting)     \"almost every time after surgery\"    Prostate cancer (HCC)     Rosacea     Skin cancer     Tinnitus     Urinary tract infection        Past Surgical History:   Procedure Laterality Date    BACK SURGERY      HERNIATED DISC/Lumbar    CARPAL TUNNEL RELEASE Bilateral     COLONOSCOPY      CT CYSTOGRAM  9/18/2019    FL RETROGRADE PYELOGRAM  9/4/2019    HERNIA REPAIR Right     inguinal    NEUROPLASTY / TRANSPOSITION MEDIAN " NERVE AT CARPAL TUNNEL BILATERAL      MS LAPS SURG [id number] RPBIC RAD W/NRV SPARING ROBOT N/A 9/4/2019    Procedure: PROSTATECTOMY RADICAL W ROBOTICS; BILATERAL PELVIC LYMPH NODE DISSECTION LYSIS OF ADHESIONS Robotic; DOUBLE LEFT URETERAL STENT INSERTION with retrograde pyelogram, Instillation of ICG; left upper pole ureteral re-implantation Robotic;  Surgeon: Jose Cruz Ceja MD;  Location: [facility];  Service: Urology    PROSTATE BIOPSY      PROSTATECTOMY      SKIN LESION EXCISION      x2 on nose for melanoma    SKIN TAG REMOVAL      TONSILLECTOMY         Family History   Problem Relation Age of Onset    Hypertension Mother     Stroke Father          Medications have been verified.        Objective   /77   Pulse 64   Temp 98.2 °F (36.8 °C)   Resp 18   SpO2 99%        Physical Exam     Physical Exam  Vitals reviewed.   Constitutional:       General: He is not in acute distress.  Cardiovascular:      Rate and Rhythm: Normal rate and regular rhythm.      Pulses: Normal pulses.      Heart sounds: Normal heart sounds.   Pulmonary:      Effort: Pulmonary effort is normal.      Breath sounds: Normal breath sounds.   Musculoskeletal:         General: Swelling, tenderness and signs of injury present.      Comments: Left lateral malleolus swelling approximately the size of golf ball   Skin:     General: Skin is warm.   Neurological:      General: No focal deficit present.      Mental Status: He is alert.      Sensory: No sensory deficit.      Motor: No weakness.      Coordination: Coordination normal.      Gait: Gait normal.

## 2024-09-16 ENCOUNTER — APPOINTMENT (OUTPATIENT)
Dept: URGENT CARE | Facility: CLINIC | Age: 69
End: 2024-09-16

## 2024-10-21 ENCOUNTER — HOSPITAL ENCOUNTER (OUTPATIENT)
Dept: ULTRASOUND IMAGING | Facility: HOSPITAL | Age: 69
Discharge: HOME | End: 2024-10-21
Payer: MEDICARE

## 2024-10-21 DIAGNOSIS — Z13.6 SCREENING FOR AAA (ABDOMINAL AORTIC ANEURYSM): ICD-10-CM

## 2024-10-21 PROCEDURE — 76706 US ABDL AORTA SCREEN AAA: CPT

## 2024-11-26 ENCOUNTER — APPOINTMENT (OUTPATIENT)
Dept: LAB | Facility: CLINIC | Age: 69
End: 2024-11-26
Payer: MEDICARE

## 2024-11-26 DIAGNOSIS — C61 PROSTATE CANCER (HCC): ICD-10-CM

## 2024-11-26 LAB — PSA SERPL-MCNC: 0.23 NG/ML (ref 0–4)

## 2024-11-26 PROCEDURE — 84153 ASSAY OF PSA TOTAL: CPT

## 2024-11-26 PROCEDURE — 36415 COLL VENOUS BLD VENIPUNCTURE: CPT

## 2024-12-10 ENCOUNTER — OFFICE VISIT (OUTPATIENT)
Dept: UROLOGY | Facility: CLINIC | Age: 69
End: 2024-12-10
Payer: MEDICARE

## 2024-12-10 ENCOUNTER — PATIENT OUTREACH (OUTPATIENT)
Dept: HEMATOLOGY ONCOLOGY | Facility: CLINIC | Age: 69
End: 2024-12-10

## 2024-12-10 ENCOUNTER — DOCUMENTATION (OUTPATIENT)
Dept: HEMATOLOGY ONCOLOGY | Facility: CLINIC | Age: 69
End: 2024-12-10

## 2024-12-10 ENCOUNTER — NURSE TRIAGE (OUTPATIENT)
Age: 69
End: 2024-12-10

## 2024-12-10 VITALS
HEART RATE: 74 BPM | WEIGHT: 206 LBS | OXYGEN SATURATION: 97 % | SYSTOLIC BLOOD PRESSURE: 142 MMHG | DIASTOLIC BLOOD PRESSURE: 76 MMHG | HEIGHT: 69 IN | BODY MASS INDEX: 30.51 KG/M2

## 2024-12-10 DIAGNOSIS — C61 PROSTATE CANCER (HCC): Primary | ICD-10-CM

## 2024-12-10 DIAGNOSIS — N52.31 ERECTILE DYSFUNCTION AFTER RADICAL PROSTATECTOMY: ICD-10-CM

## 2024-12-10 PROCEDURE — 99214 OFFICE O/P EST MOD 30 MIN: CPT | Performed by: PHYSICIAN ASSISTANT

## 2024-12-10 NOTE — ASSESSMENT & PLAN NOTE
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

## 2024-12-10 NOTE — PROGRESS NOTES
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)  Postoperative PSAs initially undetectable  Slow creeping PSA to 0.17 earlier this year prompted PSMA pet ct 2024- no evidence of disease  Continued slow creeping PSA to 0.228 presently  Discussed referral to radiation oncology for salvage therapy as there is continued PSA rise suggesting there is biochemical recurrence even in absence of current radiographic evidence of disease  Orders:  •  Ambulatory Referral to Radiation Oncology; Future    Erectile dysfunction after radical prostatectomy  using VAD with silicone ring which is increasingly uncomfortable, in conjunction with sildenafil 100mg; reaches about 50-75% rigidity insufficient for penetration; currently active 1x per week  discussed their goals- transition to ICI using trimix 30 reviewed how this is administered and functions, 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  Surgery was completed 2019.  Has had slow and commensal rising PSA in recent year, had PSMA PET scan  "which was negative for active metastases back in February 2024 PSA level at that time was 0.174. No voiding bother. Erections are fair/good but not great and not enough for penetration most often.      AUA SYMPTOM SCORE      Flowsheet Row Most Recent Value   AUA SYMPTOM SCORE    How often have you had a sensation of not emptying your bladder completely after you finished urinating? 1 (P)     How often have you had to urinate again less than two hours after you finished urinating? 0 (P)     How often have you found you stopped and started again several times when you urinate? 1 (P)     How often have you found it difficult to postpone urination? 0 (P)     How often have you had a weak urinary stream? 0 (P)     How often have you had to push or strain to begin urination? 0 (P)     How many times did you most typically get up to urinate from the time you went to bed at night until the time you got up in the morning? 1 (P)     Quality of Life: If you were to spend the rest of your life with your urinary condition just the way it is now, how would you feel about that? 1 (P)     AUA SYMPTOM SCORE 3 (P)            Review of Systems   Constitutional: Negative.    Respiratory: Negative.     Cardiovascular: Negative.    Genitourinary:  Negative for decreased urine volume, difficulty urinating, dysuria, flank pain, frequency, hematuria and urgency.   Musculoskeletal: Negative.           Objective   /76 (BP Location: Left arm, Patient Position: Sitting, Cuff Size: Standard)   Pulse 74   Ht 5' 9\" (1.753 m)   Wt 93.4 kg (206 lb)   SpO2 97%   BMI 30.42 kg/m²     Physical Exam  Vitals and nursing note reviewed.   Constitutional:       General: He is not in acute distress.     Appearance: He is well-developed. He is not diaphoretic.   HENT:      Head: Normocephalic and atraumatic.   Pulmonary:      Effort: Pulmonary effort is normal.   Musculoskeletal:      Right lower leg: No edema.      Left lower leg: No edema. "   Skin:     General: Skin is warm.   Neurological:      Mental Status: He is alert and oriented to person, place, and time.          Results  Lab Results   Component Value Date    PSA 0.228 11/26/2024    PSA 0.18 05/21/2024    PSA 0.09 06/21/2023     Lab Results   Component Value Date    CALCIUM 9.4 01/22/2024    K 4.5 01/22/2024    CO2 29 01/22/2024     01/22/2024    BUN 17 01/22/2024    CREATININE 1.01 01/22/2024     Lab Results   Component Value Date    WBC 6.38 01/22/2024    HGB 15.5 01/22/2024    HCT 46.7 01/22/2024    MCV 92 01/22/2024     01/22/2024       Office Urine Dip  No results found for this or any previous visit (from the past hour).]

## 2024-12-10 NOTE — PROGRESS NOTES
All records needed are in patients chart. No records retrieval needed at this time.     Referral received/ Chart reviewed for work up completed for radiation oncology consult for prostate cancer     Prostatectomy 2019 - T3a [place] 3+4 equal 7 prostate cancer positive margins     Imaging completed: [x][facility] []Other:    [x] PET/CT   [] MRI   [] CT   [] US   [] Mammo   [] Bone scan   [] N/A    Pathology completed: [x][facility] []Other:    Date: 6/10/19 and 9/4/19   Location:   []N/A    Additional records needed:   [] Genomic report   [] Genetic testing results   [] Office Note   [] Procedure/ Operative note   [] Lab results   [x] N/A      [] Radiation Oncology records retrieval needed (PN to route to rad/onc clerical pool once scheduled)  Date:  Location:      Urologist:  Janette Ford             PSA Date:        Lab Results   Component Value Date    PSA 0.228 11/26/2024    PSA 0.18 05/21/2024    PSA 0.09 06/21/2023

## 2024-12-10 NOTE — ASSESSMENT & PLAN NOTE
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

## 2024-12-10 NOTE — TELEPHONE ENCOUNTER
"Answer Assessment - Initial Assessment Questions  1. REASON FOR CALL: \"What is the main reason for your call?\" or \"How can I best help you?\"          Patient called in stating he is scheduled to see Dr. Dorado for oncology and wants to make sure Janette believes that's a good provider to oversee his case. Please advise.    Protocols used: Information Only Call - No Triage-Adult-[place]    "

## 2024-12-11 NOTE — PROGRESS NOTES
Outreach to pt and left voicemail introducing myself and role as Nurse Navigator to assist with coordination of cancer care, preparation for upcoming appointment, be a point of contact prior to oncology consult,  provide support and connect with available resources.  Provided contact information, reviewed upcoming appts and requested call back.    Future Appointments   Date Time Provider Department Center   1/7/2025  9:00 AM Leonid Dorado MD [facility]   1/7/2025 11:40 AM Janette Ford PA-C [facility]   2/24/2025  1:40 PM FAIZAN Bach [facility]

## 2024-12-12 NOTE — TELEPHONE ENCOUNTER
Contacted Dami and made him aware VASU Savage is happy he was able to set up an appointment with Dr. Dorado. Patient to follow up as scheduled.

## 2025-01-07 ENCOUNTER — TELEPHONE (OUTPATIENT)
Age: 70
End: 2025-01-07

## 2025-01-07 ENCOUNTER — PROCEDURE VISIT (OUTPATIENT)
Dept: UROLOGY | Facility: CLINIC | Age: 70
End: 2025-01-07

## 2025-01-07 ENCOUNTER — CONSULT (OUTPATIENT)
Dept: RADIATION ONCOLOGY | Facility: CLINIC | Age: 70
End: 2025-01-07
Payer: MEDICARE

## 2025-01-07 ENCOUNTER — HOSPITAL ENCOUNTER (EMERGENCY)
Facility: HOSPITAL | Age: 70
Discharge: HOME | End: 2025-01-07
Attending: EMERGENCY MEDICINE | Admitting: EMERGENCY MEDICINE
Payer: MEDICARE

## 2025-01-07 VITALS
BODY MASS INDEX: 30.36 KG/M2 | HEIGHT: 69 IN | RESPIRATION RATE: 18 BRPM | OXYGEN SATURATION: 95 % | DIASTOLIC BLOOD PRESSURE: 86 MMHG | SYSTOLIC BLOOD PRESSURE: 165 MMHG | TEMPERATURE: 97.6 F | WEIGHT: 205 LBS | HEART RATE: 73 BPM

## 2025-01-07 VITALS
HEART RATE: 68 BPM | DIASTOLIC BLOOD PRESSURE: 64 MMHG | BODY MASS INDEX: 30.36 KG/M2 | OXYGEN SATURATION: 95 % | SYSTOLIC BLOOD PRESSURE: 132 MMHG | HEIGHT: 69 IN | WEIGHT: 205 LBS

## 2025-01-07 VITALS
TEMPERATURE: 97.2 F | OXYGEN SATURATION: 97 % | SYSTOLIC BLOOD PRESSURE: 170 MMHG | WEIGHT: 206.2 LBS | HEART RATE: 61 BPM | BODY MASS INDEX: 30.45 KG/M2 | DIASTOLIC BLOOD PRESSURE: 78 MMHG

## 2025-01-07 DIAGNOSIS — C61 PROSTATE CANCER (HCC): Primary | Chronic | ICD-10-CM

## 2025-01-07 DIAGNOSIS — N48.33 PRIAPISM, DRUG-INDUCED: Primary | ICD-10-CM

## 2025-01-07 DIAGNOSIS — R97.20 ELEVATED PSA: ICD-10-CM

## 2025-01-07 DIAGNOSIS — C61 PROSTATE CANCER (HCC): Primary | ICD-10-CM

## 2025-01-07 PROCEDURE — 99284 EMERGENCY DEPT VISIT MOD MDM: CPT

## 2025-01-07 PROCEDURE — 99205 OFFICE O/P NEW HI 60 MIN: CPT | Performed by: INTERNAL MEDICINE

## 2025-01-07 PROCEDURE — 99211 OFF/OP EST MAY X REQ PHY/QHP: CPT | Performed by: INTERNAL MEDICINE

## 2025-01-07 RX ORDER — PHENYLEPHRINE HCL IN 0.9% NACL 1 MG/10 ML
100 SYRINGE (ML) INTRAVENOUS
Status: DISCONTINUED | OUTPATIENT
Start: 2025-01-07 | End: 2025-01-07 | Stop reason: HOSPADM

## 2025-01-07 RX ADMIN — Medication 100 MCG: at 16:57

## 2025-01-07 NOTE — ASSESSMENT & PLAN NOTE
Dami Whitman is a 69 y.o. male with biochemically recurrent prostate cancer s/p initialy RALP in 2019 ([id number], [place] 3+4, PSA 13.4 pre-treatment). His initial post-prostatectomy PSAs remained low (though not undetectable) with gianna at 0.015. He had slow rise in 2024 prompting PSMA PET that was negative. There was subsequent rise to 0.228 this November. At this time he was recommended to consult with radiation oncology for salvage radiation therapy.    We had a long and detailed discussion regarding the patient's biochemical recurrence and his treatment options. I recommend salvage radiotherapy as a standard of care intervention.     I described a typical course of radiation therapy (8 weeks of daily RT to the prostate bed and pelvis). We discussed the simulation and treatment planning process. The potential adverse acute and late effects from the recommended radiotherapy were discussed. Possible short- and long-term side effects include, but are not limited to, fatigue, dermatitis, dysuria, urinary frequency/urgency, rectal urgency/diarrhea/cramping, sexual dysfunction, cystitis, urethral stricture, proctitis, bowel obstruction, fistula, incontinence, urinary retention, and secondary malignancy. Even with appropriate therapy, there is still a risk of disease recurrence. The patient asked a number of questions that were answered to his apparent satisfaction.  Informed consent was signed.    Traditionally ADT is offered for PSA >0.7 based on subgroup analysis of RTOG 9601 (Arsalan et al) as such he may not have a strong indication for ADT at this time.    At this time we reviewed that a repeat PSA and an updated PSMA PET are indicated to guide decision-making.  He prefers to consider his options and await the results of these tests prior to committing to salvage radiation therapy.  He has a follow-up scheduled with urology today to discuss this further.  He is aware that no further follow-up is scheduled in  radiation oncology and that next steps would be arranged upon notification of his decision.  He was provided my contact information.  He was appreciative of the visit.       Orders:    Ambulatory Referral to Radiation Oncology    NM PET CT skull base to mid thigh; Future    PSA Total, Diagnostic; Future

## 2025-01-07 NOTE — PROGRESS NOTES
"1/7/2025  Dami Whitman  1955  [id number]      Assessment/Plan  Erectile dysfunction  Initiation of Tri Mix [20/1/30] injections at 0.3mL dose with 75% response    Titration plan reviewed with patient- will perform an injection every 3 to 4 dadys to start, increasing dose in increments of 0.1ml until reaching the lowest effective dose and concentration. He knows to notify my office if any questions concerns during the titration process, and also to alert us to his final effective dose, need for refills, or concentration adjustment.    also f/u PSMA PET results scheduled next week, telemedicine visit OK. pending treatment decision with radiation oncology based on PET (consult seen this morning)    Discussion  Dami Whitman is a 69 y.o. being managed by myself. He was provided verbal, written, and physical demonstration of intracavernosal injection use. He was instructed on storage, disposal, and titration of the medication. He was instructed on hospital precautions for a priapism and use of home pseudoephedrine.  He is instructed not to use more than 2-3 doses per week, alternate left/right injection sites with each use to prevent scarring. He was able to demonstrate understanding of injection use. All questions were answered.    Patient with NO history of sickle cell trait/disease, thrombocytopenia, cocaine or trazodone use. Has trialed various PDE5i either without sufficient benefit or with adverse side effects for which local therapy has been recommended instead.    History of Present Illness  55 y.o. male with ED presents today for intracavernosal injection teaching.    /64 (BP Location: Left arm, Patient Position: Sitting, Cuff Size: Standard)   Pulse 68   Ht 5' 9\" (1.753 m)   Wt 93 kg (205 lb)   SpO2 95%   BMI 30.27 kg/m²       Procedure    Procedure: intracavernosal injection  Indication: Erectile Rehabilitation  unknown cause  Discussed:. Proper technique and instruction for " intracavernosal injection were explained to the patient. risks of injection including but not limited to pain, bleeding, infection, fibrosis, and priapism (including the potential complications of priapism) were discussed.   Procedure: The skin overlying the injection site RIGHT side was then prepped with Alcohol. 0.3 ml of medication was injected into the corpora cavernosum. Wife performed the injection.  Patient Status:. the patient was closely monitored for 20 minutes and reassessed. He tolerated the procedure well. Response to intracavernosal injection was  very good 75%  Complications:. No complications noted.   Instructions:. the patient was counseled about priapism and instructed to return to the clinic or the emergency room if his erection lasted up to 4 hours. The patient expressed understanding of the injection technique and felt able to perform self-injection.

## 2025-01-07 NOTE — PROGRESS NOTES
Consultation Visit   Name: Dami Whitman      : 1955      MRN: [id number]  Encounter Provider: Leonid Dorado MD  Encounter Date: 2025   Encounter department: [facility] RADIATION ONCOLOGY  :  Assessment & Plan  Prostate cancer (HCC)  Dami Whitman is a 69 y.o. male with biochemically recurrent prostate cancer s/p initialy RALP in  ([id number], Larry 3+4, PSA 13.4 pre-treatment). His initial post-prostatectomy PSAs remained low (though not undetectable) with gianna at 0.015. He had slow rise in  prompting PSMA PET that was negative. There was subsequent rise to 0.228 this November. At this time he was recommended to consult with radiation oncology for salvage radiation therapy.    We had a long and detailed discussion regarding the patient's biochemical recurrence and his treatment options. I recommend salvage radiotherapy as a standard of care intervention.     I described a typical course of radiation therapy (8 weeks of daily RT to the prostate bed and pelvis). We discussed the simulation and treatment planning process. The potential adverse acute and late effects from the recommended radiotherapy were discussed. Possible short- and long-term side effects include, but are not limited to, fatigue, dermatitis, dysuria, urinary frequency/urgency, rectal urgency/diarrhea/cramping, sexual dysfunction, cystitis, urethral stricture, proctitis, bowel obstruction, fistula, incontinence, urinary retention, and secondary malignancy. Even with appropriate therapy, there is still a risk of disease recurrence. The patient asked a number of questions that were answered to his apparent satisfaction.  Informed consent was signed.    Traditionally ADT is offered for PSA >0.7 based on subgroup analysis of RTOG 9601 (Arsalan et al) as such he may not have a strong indication for ADT at this time.    At this time we reviewed that a repeat PSA and an updated PSMA PET are indicated to  guide decision-making.  He prefers to consider his options and await the results of these tests prior to committing to salvage radiation therapy.  He has a follow-up scheduled with urology today to discuss this further.  He is aware that no further follow-up is scheduled in radiation oncology and that next steps would be arranged upon notification of his decision.  He was provided my contact information.  He was appreciative of the visit.       Orders:    Ambulatory Referral to Radiation Oncology    NM PET CT skull base to mid thigh; Future    PSA Total, Diagnostic; Future        History of Present Illness   Chief Complaint   Patient presents with    Consult   Pertinent Medical History   With h/o [place] 7(3+4) prostate cancer.  S/P prostatectomy in 2019.  Presents in consultation for discussion of salvage RT for rising PSA. Referred by DARYL Ford PA-C Urology.  PMH includes GERD, arthritis, and ED after radical prostatectomy.    Originally diagnosed with GS 7 prostate cancer.  Prostatectomy completed 9/4/2019.  Final pathology from prostatectomy revealed EPE, margins positive for invasive carcinoma, no seminal vesical invasion.  PSA initially low post-op.  Now slowly rising.    Lab Results   Component Value Date    PSA 0.228 11/26/2024    PSA 0.18 05/21/2024    PSA 0.09 06/21/2023    PSA 13.4 (H) 04/26/2019    PSA 10.1 (H) 01/09/2019    LABPSA 0.174 01/22/2024    LABPSA 0.085 12/14/2022    LABPSA 0.095 09/19/2022    LABPSA 0.105 06/21/2022    LABPSA 0.043 12/17/2021    LABPSA 0.043 06/16/2021    LABPSA 0.031 12/04/2020    LABPSA 0.024 08/21/2020    LABPSA 0.015 04/21/2020    LABPSA 0.020 01/29/2020    LABPSA 0.026 10/18/2019      PSA, Ultrasensitive   Latest Ref Rng 0.000 - 4.000 ng/mL   1/29/2020 0.020    4/21/2020 0.015    8/21/2020 0.024    12/4/2020 0.031    6/16/2021 0.043    12/17/2021 0.043    6/21/2022 0.105    9/19/2022 0.095    12/14/2022 0.085    06/21/2023 0.09   1/22/2024 0.174    05/21/2024 0.18    11/26/24 0.228     02/19/2024  PET CT  IMPRESSION:  1. No PSMA avid metastases visualized at this time. Consider follow-up exam as clinically warranted.  2. Ectatic ascending thoracic aorta. 12-month noncontrast CT chest follow-up recommended.    12/10/24  [facility]  PSA slowly creeping.  Discussed referral to radiation oncology for salvage RT    1/7/25  Urology    Patient feels well overall. He has minimal urinary symptoms with an IPSS of 2. He has had no prior radiation. Denies bowel symptoms.     Oncology History   Cancer Staging   Prostate cancer ([facility])  Staging form: Prostate, AJCC 8th Edition  - Pathologic stage from 9/4/2019: Stage IIIB (pT3a, pN0, cM0, PSA: 13.4, Grade Group: 2) - Signed by Leonid Dorado MD on 1/7/2025  Stage prefix: Initial diagnosis  Prostate specific antigen (PSA) range: 10 to 19  Larry score: 7  Histologic grading system: 5 grade system  Residual tumor (R): R1 - Microscopic  Oncology History   Prostate cancer ([facility])   1/18/2019 Initial Diagnosis    Prostate cancer ([facility])     9/4/2019 -  Cancer Staged    Staging form: Prostate, AJCC 8th Edition  - Pathologic stage from 9/4/2019: Stage IIIB (pT3a, pN0, cM0, PSA: 13.4, Grade Group: 2) - Signed by Leonid Dorado MD on 1/7/2025  Stage prefix: Initial diagnosis  Prostate specific antigen (PSA) range: 10 to 19  Larry score: 7  Histologic grading system: 5 grade system  Residual tumor (R): R1 - Microscopic          Review of Systems Refer to nursing note.         Objective   /78   Pulse 61   Temp (!) 97.2 °F (36.2 °C)   Wt 93.5 kg (206 lb 3.2 oz)   SpO2 97%   BMI 30.45 kg/m²     Pain Screening:  Pain Score:   8  ECOG ECOG Performance Status: 0 - Fully active, able to carry on all pre-disease performance without restriction  Physical Exam   General Appearance:  Alert, cooperative, no distress, appears stated age  Lungs: Respirations unlabored, no cyanosis, able to speak in complete sentences without dyspnea.  NASRIN:  Deferred  Skin: No generalized rash or dermatitis  Neurologic: ANOx3, speech and cognition intact.    Radiology Results: I have reviewed radiology images/reports described above.   Other Study Results Review: No additional pertinent studies reviewed.    Administrative Statements   I have spent a total time of 56 minutes in caring for this patient on the day of the visit/encounter including Risks and benefits of tx options, Instructions for management, Patient and family education, Risk factor reductions, Impressions, Counseling / Coordination of care, Documenting in the medical record, Reviewing / ordering tests, medicine, procedures  , and Obtaining or reviewing history  .

## 2025-01-07 NOTE — TELEPHONE ENCOUNTER
thanks for heads up, i notified on call provider as well. hopefully sudafed and injection/aspiration in ER is all he will need. will hold off on additional ICI for the time being and see how he recovers

## 2025-01-07 NOTE — TELEPHONE ENCOUNTER
Patient's wife, Bridgette, called stating the patient had the infection of Tri Mix in the office today and he is still currently having an erection even after taking Sudafed. I advised the patient go to the ER to be evaluated. Bridgette verbalized understanding. Please advise.

## 2025-01-07 NOTE — PROGRESS NOTES
Dami Whitman 1955 is a 69 y.o. maleWith h/o [place] 7(3+4) prostate cancer.  S/P prostatectomy in 2019.  Presents in consultation for discussion of salvage RT for rising PSA. Referred by DARYL Ford PA-C Urology.  PMH includes GERD, arthritis, and ED after radical prostatectomy.    Originally diagnosed with GS 7 prostate cancer.  Prostatectomy completed 2019.  Final pathology from prostatectomy revealed EPE, margins positive for invasive carcinoma, no seminal vesical invasion.  PSA initially undetectable post-op.  Now slowly rising.       PSA, Ultrasensitive   Latest Ref Rng 0.000 - 4.000 ng/mL   2020 0.020    2020 0.015    2020 0.024    2020 0.031    2021 0.043    2021 0.043    2022 0.105    2022 0.095    2022 0.085    2023 0.09   2024 0.174    2024 0.18   24 0.228     2024  PET CT  IMPRESSION:  1. No PSMA avid metastases visualized at this time. Consider follow-up exam as clinically warranted.  2. Ectatic ascending thoracic aorta. 12-month noncontrast CT chest follow-up recommended.    12/10/24  [facility]  PSA slowly creeping.  Discussed referral to radiation oncology for salvage RT      Upcomin25  Urology      Oncology History   Prostate cancer (HCC)   2019 Initial Diagnosis    Prostate cancer (HCC)         Review of Systems:  Review of Systems   Constitutional: Negative.    HENT: Negative.     Eyes:         Reading glasses   Respiratory: Negative.     Cardiovascular: Negative.    Gastrointestinal: Negative.    Genitourinary:  Negative for dysuria, hematuria and urgency.   Musculoskeletal:  Positive for arthralgias.       Clinical Trial: no    IPSS Questionnaire (AUA-7):  Over the past month…    1)  How often have you had a sensation of not emptying your bladder completely after you finish urinating?  1 - Less than 1 time in 5   2)  How often have you had to urinate again less than two hours  "after you finished urinating? 0 - Not at all   3)  How often have you found you stopped and started again several times when you urinated?  1 - Less than 1 time in 5   4) How difficult have you found it to postpone urination?  0 - Not at all   5) How often have you had a weak urinary stream?  0 - Not at all   6) How often have you had to push or strain to begin urination?  0 - Not at all   7) How many times did you most typically get up to urinate from the time you went to bed until the time you got up in the morning?  0 - None   Total Score:  2       Pain assessment: 8    PSA: last completed 11/26/24    Prior Radiation:None    Teaching [facility] radiation oncology booklet given and reviewed    MST completed    Implantable Devices (Port, pacemaker, pain stimulator: None    Hip Replacement: None    Health Maintenance   Topic Date Due    Zoster Vaccine (1 of 2) Never done    PT PLAN OF CARE  09/04/2019    Pneumococcal Vaccine: 65+ Years (1 of 1 - PCV) Never done    Influenza Vaccine (1) Never done    COVID-19 Vaccine (4 - 2024-25 season) 09/01/2024    BMI: Followup Plan  11/20/2024    Fall Risk  02/20/2025    Depression Screening  02/20/2025    Medicare Annual Wellness Visit (AWV)  02/20/2025    Colorectal Cancer Screening  08/17/2025    BMI: Adult  12/10/2025    RSV Vaccine Age 60+ Years (1 - 1-dose 75+ series) 09/08/2032    Hepatitis C Screening  Completed    Meningococcal B Vaccine  Aged Out    RSV Vaccine age 0-20 Months  Aged Out    HIB Vaccine  Aged Out    IPV Vaccine  Aged Out    Hepatitis A Vaccine  Aged Out    Meningococcal ACWY Vaccine  Aged Out    HPV Vaccine  Aged Out       Past Medical History:   Diagnosis Date    Arthritis     mostly hands    Bladder stone     Blue baby     at birth /with heart murmur/no longer noted    Cancer (HCC)     Carpal tunnel syndrome     Chronic pain disorder 2/19/2024    \"hands\"    Generalized joint pain     Herniated disc, cervical     some neck pain    History of kidney stones     " "[place] (hard of hearing)     \"slightly\"    Kidney stone     Moderate exercise     \"very active lifestyle/walking/at least 4 miles per day/active job\"    PONV (postoperative nausea and vomiting)     \"almost every time after surgery\"    Prostate cancer (HCC)     Rosacea     Skin cancer     Tinnitus     Urinary tract infection        Past Surgical History:   Procedure Laterality Date    BACK SURGERY      HERNIATED DISC/Lumbar    CARPAL TUNNEL RELEASE Bilateral     COLONOSCOPY      CT CYSTOGRAM  9/18/2019    FL RETROGRADE PYELOGRAM  9/4/2019    HERNIA REPAIR Right     inguinal    NEUROPLASTY / TRANSPOSITION MEDIAN NERVE AT CARPAL TUNNEL BILATERAL      AL LAPS SURG [id number] RPBIC RAD W/NRV SPARING ROBOT N/A 9/4/2019    Procedure: PROSTATECTOMY RADICAL W ROBOTICS; BILATERAL PELVIC LYMPH NODE DISSECTION LYSIS OF ADHESIONS Robotic; DOUBLE LEFT URETERAL STENT INSERTION with retrograde pyelogram, Instillation of ICG; left upper pole ureteral re-implantation Robotic;  Surgeon: Jose Cruz Ceja MD;  Location: [facility];  Service: Urology    PROSTATE BIOPSY      PROSTATECTOMY      SKIN LESION EXCISION      x2 on nose for melanoma    SKIN TAG REMOVAL      TONSILLECTOMY         Family History   Problem Relation Age of Onset    Hypertension Mother     Stroke Father        Social History     Tobacco Use    Smoking status: Never     Passive exposure: Never    Smokeless tobacco: Never   Vaping Use    Vaping status: Never Used   Substance Use Topics    Alcohol use: Yes     Alcohol/week: 1.0 standard drink of alcohol     Types: 1 Glasses of wine per week     Comment: occasional wine    Drug use: No          Current Outpatient Medications:     atorvastatin (LIPITOR) 20 mg tablet, Take 1 tablet (20 mg total) by mouth every evening, Disp: 90 tablet, Rfl: 3    calcium carbonate (Tums) 500 mg chewable tablet, Chew 2 tablets 3 (three) times a day, Disp: , Rfl:     ibuprofen (MOTRIN) 200 mg tablet, Take 200 mg by mouth every 6 (six) hours " as needed for mild pain For arthiritis/ pain, Disp: , Rfl:     sildenafil (VIAGRA) 100 mg tablet, Take 1 tablet by mouth empty stomach one hour before sex, Disp: 30 tablet, Rfl: 1    Vitamin D, Cholecalciferol, 50 MCG (2000 UT) CAPS, Take 4,000 Units by mouth daily, Disp: , Rfl:     Allergies   Allergen Reactions    Ciprofloxacin GI Intolerance    Keflex [Cephalexin] GI Intolerance        There were no vitals filed for this visit.

## 2025-01-07 NOTE — ED PROVIDER NOTES
Time reflects when diagnosis was documented in both MDM as applicable and the Disposition within this note       Time User Action Codes Description Comment    1/7/2025  6:01 PM Ab Preciado Add [N48.33] Priapism, drug-induced           ED Disposition       ED Disposition   Discharge    Condition   Stable    Date/Time   Tue Jan 7, 2025  6:01 PM    Comment   Dami Davis Rehrig discharge to home/self care.                   Assessment & Plan       Medical Decision Making  69 year old male history of ED presents with priapism. Erection has been ongoing since about 12pm. Slight discomfort.    Patient was consented for treatment of his priapism.  To 21-gauge butterfly's were inserted into his corpus cavernosum, some blood was aspirated.  100 mcg of phenylephrine per mL was injected slowly over the course of several minutes.  Additional blood was aspirated.  Patient was monitored in the emergency department after flaccidity obtained and no further tumescence was noted.  He had some mild edema.    Problems Addressed:  Priapism, drug-induced: acute illness or injury    Risk  Prescription drug management.        ED Course as of 01/14/25 2149   Tue Jan 07, 2025   1800 Patient reassessed, penis still flaccid some mild residual edema.  Patient is comfortable with discharge.  Recommend follow-up with his primary urologist, refrain from proerectile medications until further discussion.       Medications - No data to display      ED Risk Strat Scores                          SBIRT 20yo+      Flowsheet Row Most Recent Value   Initial Alcohol Screen: US AUDIT-C     1. How often do you have a drink containing alcohol? 0 Filed at: 01/07/2025 1643   2. How many drinks containing alcohol do you have on a typical day you are drinking?  0 Filed at: 01/07/2025 1643   3a. Male UNDER 65: How often do you have five or more drinks on one occasion? 0 Filed at: 01/07/2025 1643   Audit-C Score 0 Filed at: 01/07/2025 1643   JERARDO: How many  "times in the past year have you...    Used an illegal drug or used a prescription medication for non-medical reasons? Never Filed at: 01/07/2025 0546                            History of Present Illness       Chief Complaint   Patient presents with    Medical Problem     According to the patient, he was at urology this morning and has had an erection for the last 4 hours.       Past Medical History:   Diagnosis Date    Arthritis     mostly hands    Bladder stone     Blue baby     at birth /with heart murmur/no longer noted    Cancer (HCC)     Carpal tunnel syndrome     Chronic pain disorder 2/19/2024    \"hands\"    Generalized joint pain     Herniated disc, cervical     some neck pain    History of kidney stones     [place] (hard of hearing)     \"slightly\"    Kidney stone     Moderate exercise     \"very active lifestyle/walking/at least 4 miles per day/active job\"    PONV (postoperative nausea and vomiting)     \"almost every time after surgery\"    Prostate cancer (HCC)     Rosacea     Skin cancer     Tinnitus     Urinary tract infection       Past Surgical History:   Procedure Laterality Date    BACK SURGERY      HERNIATED DISC/Lumbar    CARPAL TUNNEL RELEASE Bilateral     COLONOSCOPY      CT CYSTOGRAM  9/18/2019    FL RETROGRADE PYELOGRAM  9/4/2019    HERNIA REPAIR Right     inguinal    NEUROPLASTY / TRANSPOSITION MEDIAN NERVE AT CARPAL TUNNEL BILATERAL      HI LAPS SURG [id number] RPBIC RAD W/NRV SPARING ROBOT N/A 9/4/2019    Procedure: PROSTATECTOMY RADICAL W ROBOTICS; BILATERAL PELVIC LYMPH NODE DISSECTION LYSIS OF ADHESIONS Robotic; DOUBLE LEFT URETERAL STENT INSERTION with retrograde pyelogram, Instillation of ICG; left upper pole ureteral re-implantation Robotic;  Surgeon: Jose Cruz Ceja MD;  Location: [facility];  Service: Urology    PROSTATE BIOPSY      PROSTATECTOMY      SKIN LESION EXCISION      x2 on nose for melanoma    SKIN TAG REMOVAL      TONSILLECTOMY        Family History   Problem Relation Age of " Onset    Hypertension Mother     Stroke Father     Prostate cancer Brother     Prostate cancer Brother       Social History     Tobacco Use    Smoking status: Never     Passive exposure: Never    Smokeless tobacco: Never   Vaping Use    Vaping status: Never Used   Substance Use Topics    Alcohol use: Yes     Alcohol/week: 1.0 standard drink of alcohol     Types: 1 Glasses of wine per week     Comment: occasional wine    Drug use: Never      E-Cigarette/Vaping    E-Cigarette Use Never User       E-Cigarette/Vaping Substances    Nicotine No     THC No     CBD No     Flavoring No     Other No     Unknown No       I have reviewed and agree with the history as documented.     Patient presents with a erection lasting longer than 4 hours after starting the medication (trimex)      Medical Problem      Review of Systems        Objective       ED Triage Vitals [01/07/25 1640]   Temperature Pulse Blood Pressure Respirations SpO2 Patient Position - Orthostatic VS   97.6 °F (36.4 °C) 73 165/86 18 95 % Lying      Temp Source Heart Rate Source BP Location FiO2 (%) Pain Score    Temporal -- Right arm -- 4      Vitals      Date and Time Temp Pulse SpO2 Resp BP Pain Score FACES Pain Rating User   01/07/25 1640 97.6 °F (36.4 °C) 73 95 % 18 165/86 4 -- [facility]            Physical Exam  Vitals and nursing note reviewed.   Constitutional:       General: He is not in acute distress.     Appearance: Normal appearance.   HENT:      Head: Normocephalic and atraumatic.      Nose: Nose normal.   Eyes:      General:         Right eye: No discharge.         Left eye: No discharge.   Cardiovascular:      Rate and Rhythm: Normal rate and regular rhythm.   Pulmonary:      Effort: Pulmonary effort is normal. No respiratory distress.   Abdominal:      General: Abdomen is flat. There is no distension.   Genitourinary:     Comments: Tumescent penis  Musculoskeletal:         General: No deformity. Normal range of motion.   Skin:     General: Skin is warm.       Findings: No rash.   Neurological:      Mental Status: He is alert and oriented to person, place, and time.      Motor: No weakness.   Psychiatric:         Mood and Affect: Mood normal.         Behavior: Behavior normal.       Results Reviewed       None            No orders to display       CriticalCare Time    Date/Time: 1/7/2025 9:36 PM    Performed by: Ab Preciado DO  Authorized by: Ab Preciado DO    Critical care provider statement:     Critical care time (minutes):  40    Critical care time was exclusive of:  Separately billable procedures and treating other patients and teaching time    Critical care was necessary to treat or prevent imminent or life-threatening deterioration of the following conditions:  Circulatory failure    Critical care was time spent personally by me on the following activities:  Examination of patient, development of treatment plan with patient or surrogate, obtaining history from patient or surrogate, review of old charts and ordering and performing treatments and interventions  Comments:      Priapism required emergent treatment to prevent potential limb ischemia and potential loss of penis  General Procedure    Date/Time: 1/14/2025 9:45 PM    Performed by: Ab Preciado DO  Authorized by: Ab Preciado DO    Consent:     Consent obtained:  Emergent situation and verbal    Consent given by:  Patient    Risks discussed:  Pain, incomplete drainage, nerve damage, poor cosmetic result, infection and bleeding    Alternatives discussed:  No treatment  Universal protocol:     Procedure explained and questions answered to patient or proxy's satisfaction: yes      Patient identity confirmed:  Verbally with patient  Indications:     Indications:  Erection >4 hours  Pre-procedure details:     Skin preparation:  Betadine    Preparation: Patient was prepped and draped in the usual sterile fashion    Anesthesia (see MAR for exact dosages):     Anesthesia  method:  None  Procedure Detail:     Equipment used:  2x 21g butterfly    Procedure note (site, laterality, method, findings):  Incremental injection of 100mcg/mL phenylephrine  Drainage of approximately 60mL blood from penis (30mL/side)  Tumescence resolved  Post-procedure details:     Patient tolerance of procedure:  Tolerated well, no immediate complications      ED Medication and Procedure Management   Prior to Admission Medications   Prescriptions Last Dose Informant Patient Reported? Taking?   Vitamin D, Cholecalciferol, 50 MCG (2000 UT) CAPS  Self, Spouse/Significant Other Yes No   Sig: Take 4,000 Units by mouth daily   atorvastatin (LIPITOR) 20 mg tablet  Self, Spouse/Significant Other No No   Sig: Take 1 tablet (20 mg total) by mouth every evening   calcium carbonate (Tums) 500 mg chewable tablet  Self, Spouse/Significant Other Yes No   Sig: Chew 2 tablets 3 (three) times a day   ibuprofen (MOTRIN) 200 mg tablet  Self, Spouse/Significant Other Yes No   Sig: Take 200 mg by mouth every 6 (six) hours as needed for mild pain For arthiritis/ pain   sildenafil (VIAGRA) 100 mg tablet  Self, Spouse/Significant Other No No   Sig: Take 1 tablet by mouth empty stomach one hour before sex      Facility-Administered Medications: None     Discharge Medication List as of 1/7/2025  6:03 PM        CONTINUE these medications which have NOT CHANGED    Details   atorvastatin (LIPITOR) 20 mg tablet Take 1 tablet (20 mg total) by mouth every evening, Starting Tue 4/30/2024, Normal      calcium carbonate (Tums) 500 mg chewable tablet Chew 2 tablets 3 (three) times a day, Historical Med      ibuprofen (MOTRIN) 200 mg tablet Take 200 mg by mouth every 6 (six) hours as needed for mild pain For arthiritis/ pain, Historical Med      sildenafil (VIAGRA) 100 mg tablet Take 1 tablet by mouth empty stomach one hour before sex, Normal      Vitamin D, Cholecalciferol, 50 MCG (2000 UT) CAPS Take 4,000 Units by mouth daily, Historical Med            No discharge procedures on file.  ED SEPSIS DOCUMENTATION   Time reflects when diagnosis was documented in both MDM as applicable and the Disposition within this note       Time User Action Codes Description Comment    1/7/2025  6:01 PM Ab Preciado Add [N48.33] Priapism, drug-induced                  Ab Preciado,   01/07/25 2140       Ab Preciado,   01/14/25 2149

## 2025-01-07 NOTE — DISCHARGE INSTRUCTIONS
You are given medication and your priapism improved.  You may have some residual swelling/edema/soreness however if your erection returned you should return to the emergency department for further treatment.    Refrain from your medication until you discuss with your urology team

## 2025-01-09 NOTE — TELEPHONE ENCOUNTER
er note reviewed- aspiration and 100mcg phenylephrine with immediate detumescence and discharged home    please check in with Margarito Angeles today will continue to hold off on further PDE5i or Trimix for 1 week. Can retry the current concentration trimix 20/1/30 at 0.1ml (10 units) instead of 30 after one week

## 2025-01-09 NOTE — TELEPHONE ENCOUNTER
"Called and spoke with  the patient and his wife Bridgette.  Zackary states he is doing \"ok\" and \" very sore\"    Discussed with both Janette's recommendations to hold off  on any further Trimix for a week.  Advised if they would reconsider the Trimix to begin at the lowest dosage 0.1ml or 10 units.    Zackary expressed concern about the same scenario happening again. \" I do not want to go to the ER again\"    Reluis asked what is the percentage of the prolonged erection recurring with 0.1 ml.    Advised Bridgette I do not know the answer but will send encounter to AP then call her back  "

## 2025-01-10 ENCOUNTER — APPOINTMENT (OUTPATIENT)
Dept: LAB | Facility: CLINIC | Age: 70
End: 2025-01-10
Payer: MEDICARE

## 2025-01-10 DIAGNOSIS — C61 PROSTATE CANCER (HCC): ICD-10-CM

## 2025-01-10 LAB — PSA SERPL-MCNC: 0.27 NG/ML (ref 0–4)

## 2025-01-10 PROCEDURE — 84153 ASSAY OF PSA TOTAL: CPT

## 2025-01-10 PROCEDURE — 36415 COLL VENOUS BLD VENIPUNCTURE: CPT

## 2025-01-14 NOTE — TELEPHONE ENCOUNTER
Pt returned RNs I did relay APs message but he would still like a call back  from RN to discuss further.    Please call him back at [phone]

## 2025-01-14 NOTE — TELEPHONE ENCOUNTER
Called and spoke with the patient who is still uncertain what to do about the injection.  There is no guarantee patient will not experience a prolonged erection with 0.1 ml dosage.  Patient knows the risk is lower but no guarantee.    Patient states he has already spent 65.00 dollars on this script. Patient does not want to spend another 65.00 dollars on the lower concentration of Trimix  when it may not work.    Patient will think about and discuss with his wife. If patient has any other questions, he will contact the office.

## 2025-01-14 NOTE — TELEPHONE ENCOUNTER
Voicemail message left for the patient asking to please call the office to review Kisha's CRNP recommendations.

## 2025-01-14 NOTE — TELEPHONE ENCOUNTER
Much lower risk with 0.1 ml injection. If patient would feel more comfortable we can decrease the concentration to 10/1/30 and start with a slow titration up. Can see what patient feels comfortable with, I can resend a script then if he would like the concentration decreased.

## 2025-01-15 ENCOUNTER — RESULTS FOLLOW-UP (OUTPATIENT)
Facility: HOSPITAL | Age: 70
End: 2025-01-15

## 2025-01-15 ENCOUNTER — HOSPITAL ENCOUNTER (OUTPATIENT)
Dept: RADIOLOGY | Age: 70
Discharge: HOME | End: 2025-01-15
Payer: MEDICARE

## 2025-01-15 DIAGNOSIS — R97.20 ELEVATED PSA: ICD-10-CM

## 2025-01-15 DIAGNOSIS — C61 PROSTATE CANCER (HCC): ICD-10-CM

## 2025-01-15 PROCEDURE — A9596 HB ILLUCCIX - GA 68 PSMA -11, PER MCI: HCPCS

## 2025-01-15 PROCEDURE — 78815 PET IMAGE W/CT SKULL-THIGH: CPT

## 2025-02-04 ENCOUNTER — PATIENT OUTREACH (OUTPATIENT)
Dept: HEMATOLOGY ONCOLOGY | Facility: CLINIC | Age: 70
End: 2025-02-04

## 2025-02-05 ENCOUNTER — PATIENT OUTREACH (OUTPATIENT)
Dept: HEMATOLOGY ONCOLOGY | Facility: CLINIC | Age: 70
End: 2025-02-05

## 2025-02-05 DIAGNOSIS — C61 PROSTATE CANCER (HCC): Primary | Chronic | ICD-10-CM

## 2025-02-05 NOTE — PROGRESS NOTES
I reached out and spoke with Dami, now that consults have been completed with the oncology teams. I introduced myself and explained my role as their Patient Navigator. I reviewed for any barriers to care and offered supportive services as needed. I reviewed and updated the members assigned to the care team in [facility]. He knows the members of the care team as well as how and when to contact them with any needs.     Distress Thermometer completed at this time. Patient scored 5/10. Referral to  placed.    He denies transportation issues.    He denies any uncontrolled symptoms. Discussed role of Palliative Care in symptom and side effect management. Declined referral at this time.    He states that he is eating and drinking as per usual with no unintentional weight loss.       Patient does not smoke.     He states he is well supported by family and friends.  Community support groups discussed including the Cancer Support Community of the [facility]. Patient declined information at this time.     He feels he has adequate insurance coverage and denies any financial concerns at this time.     He verbalizes managing the schedules well.   Future Appointments   Date Time Provider Department Center   2/24/2025  1:40 PM FAIZAN Bach [facility]      Patient wants to know if he can get PSA next month and so he can then make his final treatment decision. He is aware I will be sending message to RAD ONC office to address.    Based on individual needs I will follow up in about a few weeks. I have provided my direct contact information and welcome them to contact me if needs as discussed above change. He was appreciative for the call.

## 2025-02-06 ENCOUNTER — PATIENT OUTREACH (OUTPATIENT)
Dept: CASE MANAGEMENT | Facility: OTHER | Age: 70
End: 2025-02-06

## 2025-02-06 ENCOUNTER — TELEPHONE (OUTPATIENT)
Dept: OTHER | Facility: HOSPITAL | Age: 70
End: 2025-02-06

## 2025-02-06 DIAGNOSIS — C61 PROSTATE CANCER (HCC): Primary | Chronic | ICD-10-CM

## 2025-02-06 NOTE — TELEPHONE ENCOUNTER
Margarito saw Dr Dorado for rad onc consult. Had his PET scan. Margarito asked to repeat PSA again in a month or so before making his final committment for salvage XRT    I placed the PSA order  Can see me in a month, when he has his bloodwork done, or telemedicine visit is fine [place]. I have a feeling he will hold off on the trimix after his priapism last month (which was so disappointing!), we can do lower concentration but he may wish to stick with the sildenafil i'm ok with that too

## 2025-02-07 NOTE — TELEPHONE ENCOUNTER
Patient was returning a call to the office. Per the the previous encounter, it was to review the message from Janette BEVERLY.    Reviewed the recommendations. Patient confirmed. He was scheduled for a follow up with Janette for 3/7 at 2:20 pm. He would prefer it to be the telemed (updated, thanks to office staff)  visit though. He will get the PSA done prior.     As for the medications, he is going to hold off on both for now. He wants to see what the numbers are and go from there he said.

## 2025-02-07 NOTE — TELEPHONE ENCOUNTER
Voicemail message left for the patient asking to call the office to discuss Janette's recommendations

## 2025-02-18 ENCOUNTER — APPOINTMENT (OUTPATIENT)
Dept: LAB | Facility: CLINIC | Age: 70
End: 2025-02-18
Payer: MEDICARE

## 2025-02-18 DIAGNOSIS — E55.9 VITAMIN D DEFICIENCY: Chronic | ICD-10-CM

## 2025-02-18 DIAGNOSIS — Z00.00 MEDICARE ANNUAL WELLNESS VISIT, SUBSEQUENT: ICD-10-CM

## 2025-02-18 DIAGNOSIS — C61 PROSTATE CANCER (HCC): Chronic | ICD-10-CM

## 2025-02-18 DIAGNOSIS — E78.2 MIXED HYPERLIPIDEMIA: Chronic | ICD-10-CM

## 2025-02-18 DIAGNOSIS — Z13.1 SCREENING FOR DIABETES MELLITUS: ICD-10-CM

## 2025-02-18 LAB
25(OH)D3 SERPL-MCNC: 67.1 NG/ML (ref 30–100)
ALBUMIN SERPL BCG-MCNC: 4.2 G/DL (ref 3.5–5)
ALP SERPL-CCNC: 65 U/L (ref 34–104)
ALT SERPL W P-5'-P-CCNC: 30 U/L (ref 7–52)
ANION GAP SERPL CALCULATED.3IONS-SCNC: 9 MMOL/L (ref 4–13)
AST SERPL W P-5'-P-CCNC: 22 U/L (ref 13–39)
BASOPHILS # BLD AUTO: 0.07 THOUSANDS/ΜL (ref 0–0.1)
BASOPHILS NFR BLD AUTO: 1 % (ref 0–1)
BILIRUB SERPL-MCNC: 0.64 MG/DL (ref 0.2–1)
BUN SERPL-MCNC: 15 MG/DL (ref 5–25)
CALCIUM SERPL-MCNC: 9.8 MG/DL (ref 8.4–10.2)
CHLORIDE SERPL-SCNC: 101 MMOL/L (ref 96–108)
CHOLEST SERPL-MCNC: 163 MG/DL (ref ?–200)
CO2 SERPL-SCNC: 30 MMOL/L (ref 21–32)
CREAT SERPL-MCNC: 1.18 MG/DL (ref 0.6–1.3)
EOSINOPHIL # BLD AUTO: 0.35 THOUSAND/ΜL (ref 0–0.61)
EOSINOPHIL NFR BLD AUTO: 4 % (ref 0–6)
ERYTHROCYTE [DISTWIDTH] IN BLOOD BY AUTOMATED COUNT: 12.9 % (ref 11.6–15.1)
EST. AVERAGE GLUCOSE BLD GHB EST-MCNC: 120 MG/DL
GFR SERPL CREATININE-BSD FRML MDRD: 63 ML/MIN/1.73SQ M
GLUCOSE P FAST SERPL-MCNC: 105 MG/DL (ref 65–99)
HBA1C MFR BLD: 5.8 %
HCT VFR BLD AUTO: 43.7 % (ref 36.5–49.3)
HDLC SERPL-MCNC: 41 MG/DL
HGB BLD-MCNC: 14.6 G/DL (ref 12–17)
IMM GRANULOCYTES # BLD AUTO: 0.04 THOUSAND/UL (ref 0–0.2)
IMM GRANULOCYTES NFR BLD AUTO: 1 % (ref 0–2)
LDLC SERPL CALC-MCNC: 98 MG/DL (ref 0–100)
LYMPHOCYTES # BLD AUTO: 2.77 THOUSANDS/ΜL (ref 0.6–4.47)
LYMPHOCYTES NFR BLD AUTO: 35 % (ref 14–44)
MCH RBC QN AUTO: 30.1 PG (ref 26.8–34.3)
MCHC RBC AUTO-ENTMCNC: 33.4 G/DL (ref 31.4–37.4)
MCV RBC AUTO: 90 FL (ref 82–98)
MONOCYTES # BLD AUTO: 0.75 THOUSAND/ΜL (ref 0.17–1.22)
MONOCYTES NFR BLD AUTO: 10 % (ref 4–12)
NEUTROPHILS # BLD AUTO: 3.93 THOUSANDS/ΜL (ref 1.85–7.62)
NEUTS SEG NFR BLD AUTO: 49 % (ref 43–75)
NONHDLC SERPL-MCNC: 122 MG/DL
NRBC BLD AUTO-RTO: 0 /100 WBCS
PLATELET # BLD AUTO: 284 THOUSANDS/UL (ref 149–390)
PMV BLD AUTO: 9.7 FL (ref 8.9–12.7)
POTASSIUM SERPL-SCNC: 4.4 MMOL/L (ref 3.5–5.3)
PROT SERPL-MCNC: 7.3 G/DL (ref 6.4–8.4)
PSA SERPL-MCNC: 0.29 NG/ML (ref 0–4)
RBC # BLD AUTO: 4.85 MILLION/UL (ref 3.88–5.62)
SODIUM SERPL-SCNC: 140 MMOL/L (ref 135–147)
TRIGL SERPL-MCNC: 121 MG/DL (ref ?–150)
WBC # BLD AUTO: 7.91 THOUSAND/UL (ref 4.31–10.16)

## 2025-02-18 PROCEDURE — 80061 LIPID PANEL: CPT

## 2025-02-18 PROCEDURE — 80053 COMPREHEN METABOLIC PANEL: CPT

## 2025-02-18 PROCEDURE — 85025 COMPLETE CBC W/AUTO DIFF WBC: CPT

## 2025-02-18 PROCEDURE — 84153 ASSAY OF PSA TOTAL: CPT

## 2025-02-18 PROCEDURE — 36415 COLL VENOUS BLD VENIPUNCTURE: CPT

## 2025-02-18 PROCEDURE — 82306 VITAMIN D 25 HYDROXY: CPT

## 2025-02-18 PROCEDURE — 83036 HEMOGLOBIN GLYCOSYLATED A1C: CPT

## 2025-02-23 PROBLEM — Z00.00 MEDICARE ANNUAL WELLNESS VISIT, SUBSEQUENT: Chronic | Status: ACTIVE | Noted: 2024-02-19

## 2025-02-23 PROBLEM — R73.03 PREDIABETES: Status: ACTIVE | Noted: 2022-08-09

## 2025-02-23 PROBLEM — R73.03 PREDIABETES: Chronic | Status: ACTIVE | Noted: 2022-08-09

## 2025-02-23 NOTE — ASSESSMENT & PLAN NOTE
Component  Ref Range & Units (hover) 2/18/25  7:43 AM 1/22/24  7:32 AM 8/15/22  7:52 AM 6/16/21  7:46 AM 8/13/18 11:15 AM 8/13/18 12:00 AM   Hemoglobin A1C 5.8 High  5.5 5.9 High  R 5.3 R 5.3 R 5.3 R    111 123 105 105

## 2025-02-23 NOTE — PATIENT INSTRUCTIONS
_________________________________________________________________  YOU ARE DUE FOR YOUR MEDICARE ANNUAL WELLNESS PHYSICAL EXAM AFTER 2/24/2026.  REPEAT LABS ORDERED, PLEASE HAVE THEM DRAWN THE WEEK PRIOR TO YOUR VISIT (APPROXIMATELY 2/17/2026).  LABS HAVE BEEN ORDERED FOR YOU.   THESE LABS SHOULD BE DRAWN PRIOR TO YOUR NEXT VISIT.  YOU CAN HAVE THEM DRAWN UP TO 1 WEEK PRIOR TO YOUR NEXT VISIT.  HAVING YOUR BLOOD WORK WHEN YOU COME TO YOUR NEXT VISIT WILL ALLOW ME TO PROVIDE THE BEST MEDICAL CARE FOR YOU WITHOUT HAVING EITHER OF US PERFORM MORE WORK THAN NECESSARY.  PLEASE BE COMPLIANT WITH THIS REQUEST.   _____________________________________________________________________  Medicare Preventive Visit Patient Instructions  Thank you for completing your Welcome to Medicare Visit or Medicare Annual Wellness Visit today. Your next wellness visit will be due in one year (2/25/2026).  The screening/preventive services that you may require over the next 5-10 years are detailed below. Some tests may not apply to you based off risk factors and/or age. Screening tests ordered at today's visit but not completed yet may show as past due. Also, please note that scanned in results may not display below.  Preventive Screenings:  Service Recommendations Previous Testing/Comments   Colorectal Cancer Screening  Colonoscopy    Fecal Occult Blood Test (FOBT)/Fecal Immunochemical Test (FIT)  Fecal DNA/Cologuard Test  Flexible Sigmoidoscopy Age: 45-75 years old   Colonoscopy: every 10 years (May be performed more frequently if at higher risk)  OR  FOBT/FIT: every 1 year  OR  Cologuard: every 3 years  OR  Sigmoidoscopy: every 5 years  Screening may be recommended earlier than age 45 if at higher risk for colorectal cancer. Also, an individualized decision between you and your healthcare provider will decide whether screening between the ages of 76-85 would be appropriate. Colonoscopy: 08/17/2015  FOBT/FIT: Not on file  Cologuard: Not on  file  Sigmoidoscopy: Not on file    Screening Current     Prostate Cancer Screening Individualized decision between patient and health care provider in men between ages of 55-69   Medicare will cover every 12 months beginning on the day after your 50th birthday PSA: 0.291 ng/mL     History Prostate Cancer     Hepatitis C Screening Once for adults born between 1945 and 1965  More frequently in patients at high risk for Hepatitis C Hep C Antibody: 01/09/2019    Screening Current   Diabetes Screening 1-2 times per year if you're at risk for diabetes or have pre-diabetes Fasting glucose: 105 mg/dL (2/18/2025)  A1C: 5.8 % (2/18/2025)  Screening Current   Cholesterol Screening Once every 5 years if you don't have a lipid disorder. May order more often based on risk factors. Lipid panel: 02/18/2025  Screening Not Indicated  History Lipid Disorder      Other Preventive Screenings Covered by Medicare:  Abdominal Aortic Aneurysm (AAA) Screening: covered once if your at risk. You're considered to be at risk if you have a family history of AAA or a male between the age of 65-75 who smoking at least 100 cigarettes in your lifetime.  Lung Cancer Screening: covers low dose CT scan once per year if you meet all of the following conditions: (1) Age 55-77; (2) No signs or symptoms of lung cancer; (3) Current smoker or have quit smoking within the last 15 years; (4) You have a tobacco smoking history of at least 20 pack years (packs per day x number of years you smoked); (5) You get a written order from a healthcare provider.  Glaucoma Screening: covered annually if you're considered high risk: (1) You have diabetes OR (2) Family history of glaucoma OR (3)  aged 50 and older OR (4)  American aged 65 and older  Osteoporosis Screening: covered every 2 years if you meet one of the following conditions: (1) Have a vertebral abnormality; (2) On glucocorticoid therapy for more than 3 months; (3) Have primary  hyperparathyroidism; (4) On osteoporosis medications and need to assess response to drug therapy.  HIV Screening: covered annually if you're between the age of 15-65. Also covered annually if you are younger than 15 and older than 65 with risk factors for HIV infection. For pregnant patients, it is covered up to 3 times per pregnancy.    Immunizations:  Immunization Recommendations   Influenza Vaccine Annual influenza vaccination during flu season is recommended for all persons aged >= 6 months who do not have contraindications   Pneumococcal Vaccine   * Pneumococcal conjugate vaccine = PCV13 (Prevnar 13), PCV15 (Vaxneuvance), PCV20 (Prevnar 20)  * Pneumococcal polysaccharide vaccine = PPSV23 (Pneumovax) Adults 19-65 yo with certain risk factors or if 65+ yo  If never received any pneumonia vaccine: recommend Prevnar 20 (PCV20)  Give PCV20 if previously received 1 dose of PCV13 or PPSV23   Hepatitis B Vaccine 3 dose series if at intermediate or high risk (ex: diabetes, end stage renal disease, liver disease)   Respiratory syncytial virus (RSV) Vaccine - COVERED BY MEDICARE PART D  * RSVPreF3 (Arexvy) CDC recommends that adults 60 years of age and older may receive a single dose of RSV vaccine using shared clinical decision-making (SCDM)   Tetanus (Td) Vaccine - COST NOT COVERED BY MEDICARE PART B Following completion of primary series, a booster dose should be given every 10 years to maintain immunity against tetanus. Td may also be given as tetanus wound prophylaxis.   Tdap Vaccine - COST NOT COVERED BY MEDICARE PART B Recommended at least once for all adults. For pregnant patients, recommended with each pregnancy.   Shingles Vaccine (Shingrix) - COST NOT COVERED BY MEDICARE PART B  2 shot series recommended in those 19 years and older who have or will have weakened immune systems or those 50 years and older     Health Maintenance Due:      Topic Date Due   • Colorectal Cancer Screening  08/17/2025   • Hepatitis C  Screening  Completed     Immunizations Due:      Topic Date Due   • Pneumococcal Vaccine: 65+ Years (1 of 1 - PCV) Never done   • Influenza Vaccine (1) Never done   • COVID-19 Vaccine (4 - 2024-25 season) 09/01/2024     Advance Directives   What are advance directives?  Advance directives are legal documents that state your wishes and plans for medical care. These plans are made ahead of time in case you lose your ability to make decisions for yourself. Advance directives can apply to any medical decision, such as the treatments you want, and if you want to donate organs.   What are the types of advance directives?  There are many types of advance directives, and each state has rules about how to use them. You may choose a combination of any of the following:  Living will:  This is a written record of the treatment you want. You can also choose which treatments you do not want, which to limit, and which to stop at a certain time. This includes surgery, medicine, IV fluid, and tube feedings.   Durable power of  for healthcare (DPAHC):  This is a written record that states who you want to make healthcare choices for you when you are unable to make them for yourself. This person, called a proxy, is usually a family member or a friend. You may choose more than 1 proxy.  Do not resuscitate (DNR) order:  A DNR order is used in case your heart stops beating or you stop breathing. It is a request not to have certain forms of treatment, such as CPR. A DNR order may be included in other types of advance directives.  Medical directive:  This covers the care that you want if you are in a coma, near death, or unable to make decisions for yourself. You can list the treatments you want for each condition. Treatment may include pain medicine, surgery, blood transfusions, dialysis, IV or tube feedings, and a ventilator (breathing machine).  Values history:  This document has questions about your views, beliefs, and how you  feel and think about life. This information can help others choose the care that you would choose.  Why are advance directives important?  An advance directive helps you control your care. Although spoken wishes may be used, it is better to have your wishes written down. Spoken wishes can be misunderstood, or not followed. Treatments may be given even if you do not want them. An advance directive may make it easier for your family to make difficult choices about your care.   Weight Management   Why it is important to manage your weight:  Being overweight increases your risk of health conditions such as heart disease, high blood pressure, type 2 diabetes, and certain types of cancer. It can also increase your risk for osteoarthritis, sleep apnea, and other respiratory problems. Aim for a slow, steady weight loss. Even a small amount of weight loss can lower your risk of health problems.  How to lose weight safely:  A safe and healthy way to lose weight is to eat fewer calories and get regular exercise. You can lose up about 1 pound a week by decreasing the number of calories you eat by 500 calories each day.   Healthy meal plan for weight management:  A healthy meal plan includes a variety of foods, contains fewer calories, and helps you stay healthy. A healthy meal plan includes the following:  Eat whole-grain foods more often.  A healthy meal plan should contain fiber. Fiber is the part of grains, fruits, and vegetables that is not broken down by your body. Whole-grain foods are healthy and provide extra fiber in your diet. Some examples of whole-grain foods are whole-wheat breads and pastas, oatmeal, brown rice, and bulgur.  Eat a variety of vegetables every day.  Include dark, leafy greens such as spinach, kale, reena greens, and mustard greens. Eat yellow and orange vegetables such as carrots, sweet potatoes, and winter squash.   Eat a variety of fruits every day.  Choose fresh or canned fruit (canned in its  own juice or light syrup) instead of juice. Fruit juice has very little or no fiber.  Eat low-fat dairy foods.  Drink fat-free (skim) milk or 1% milk. Eat fat-free yogurt and low-fat cottage cheese. Try low-fat cheeses such as mozzarella and other reduced-fat cheeses.  Choose meat and other protein foods that are low in fat.  Choose beans or other legumes such as split peas or lentils. Choose fish, skinless poultry (chicken or turkey), or lean cuts of red meat (beef or pork). Before you cook meat or poultry, cut off any visible fat.   Use less fat and oil.  Try baking foods instead of frying them. Add less fat, such as margarine, sour cream, regular salad dressing and mayonnaise to foods. Eat fewer high-fat foods. Some examples of high-fat foods include french fries, doughnuts, ice cream, and cakes.  Eat fewer sweets.  Limit foods and drinks that are high in sugar. This includes candy, cookies, regular soda, and sweetened drinks.  Exercise:  Exercise at least 30 minutes per day on most days of the week. Some examples of exercise include walking, biking, dancing, and swimming. You can also fit in more physical activity by taking the stairs instead of the elevator or parking farther away from stores. Ask your healthcare provider about the best exercise plan for you.      © Copyright [employer] 2018 Information is for End User's use only and may not be sold, redistributed or otherwise used for commercial purposes. All illustrations and images included in CareNotes® are the copyrighted property of [employer] or [employer]

## 2025-02-24 ENCOUNTER — OFFICE VISIT (OUTPATIENT)
Dept: FAMILY MEDICINE CLINIC | Facility: CLINIC | Age: 70
End: 2025-02-24
Payer: MEDICARE

## 2025-02-24 VITALS
DIASTOLIC BLOOD PRESSURE: 70 MMHG | WEIGHT: 201 LBS | TEMPERATURE: 97.7 F | HEIGHT: 69 IN | HEART RATE: 84 BPM | RESPIRATION RATE: 16 BRPM | BODY MASS INDEX: 29.77 KG/M2 | OXYGEN SATURATION: 95 % | SYSTOLIC BLOOD PRESSURE: 130 MMHG

## 2025-02-24 DIAGNOSIS — Z13.1 SCREENING FOR DIABETES MELLITUS: ICD-10-CM

## 2025-02-24 DIAGNOSIS — K21.9 GASTROESOPHAGEAL REFLUX DISEASE WITHOUT ESOPHAGITIS: Chronic | ICD-10-CM

## 2025-02-24 DIAGNOSIS — Z12.5 SCREENING FOR PROSTATE CANCER: ICD-10-CM

## 2025-02-24 DIAGNOSIS — C61 PROSTATE CANCER (HCC): Chronic | ICD-10-CM

## 2025-02-24 DIAGNOSIS — M79.641 BILATERAL HAND PAIN: Chronic | ICD-10-CM

## 2025-02-24 DIAGNOSIS — R73.03 PREDIABETES: Chronic | ICD-10-CM

## 2025-02-24 DIAGNOSIS — E55.9 VITAMIN D DEFICIENCY: Chronic | ICD-10-CM

## 2025-02-24 DIAGNOSIS — E78.2 MIXED HYPERLIPIDEMIA: Chronic | ICD-10-CM

## 2025-02-24 DIAGNOSIS — Z00.00 ANNUAL PHYSICAL EXAM: ICD-10-CM

## 2025-02-24 DIAGNOSIS — Z00.00 MEDICARE ANNUAL WELLNESS VISIT, SUBSEQUENT: Primary | Chronic | ICD-10-CM

## 2025-02-24 DIAGNOSIS — M79.642 BILATERAL HAND PAIN: Chronic | ICD-10-CM

## 2025-02-24 DIAGNOSIS — N52.31 ERECTILE DYSFUNCTION AFTER RADICAL PROSTATECTOMY: ICD-10-CM

## 2025-02-24 DIAGNOSIS — Z23 ENCOUNTER FOR IMMUNIZATION: ICD-10-CM

## 2025-02-24 PROCEDURE — G0537 PR RISK ASCVD TST ONCE PR 12 MO: HCPCS | Performed by: NURSE PRACTITIONER

## 2025-02-24 PROCEDURE — G0538 PR ASCVD RSK MNG CLIN STF PR MO: HCPCS | Performed by: NURSE PRACTITIONER

## 2025-02-24 PROCEDURE — G0439 PPPS, SUBSEQ VISIT: HCPCS | Performed by: NURSE PRACTITIONER

## 2025-02-24 RX ORDER — HYDROXYCHLOROQUINE SULFATE 200 MG/1
200 TABLET, FILM COATED ORAL
Qty: 90 TABLET | Refills: 3 | Status: SHIPPED | OUTPATIENT
Start: 2025-02-24 | End: 2026-02-19

## 2025-02-24 NOTE — ASSESSMENT & PLAN NOTE
Orders:  •  hydroxychloroquine (Plaquenil) 200 mg tablet; Take 1 tablet (200 mg total) by mouth daily with breakfast

## 2025-02-24 NOTE — PROGRESS NOTES
Name: Dami Whitman      : 1955      MRN: [id number]  Encounter Provider: FAIZAN Bach  Encounter Date: 2025   Encounter department: [facility]    Assessment & Plan  Encounter for immunization         Gastroesophageal reflux disease without esophagitis         Prediabetes  Component  Ref Range & Units (hover) 25  7:43 AM 24  7:32 AM 8/15/22  7:52 AM 21  7:46 AM 18 11:15 AM 18 12:00 AM   Hemoglobin A1C 5.8 High  5.5 5.9 High  R 5.3 R 5.3 R 5.3 R    111 123 105 105           Vitamin D deficiency    Orders:  •  Vitamin D 25 hydroxy; Future    Mixed hyperlipidemia    Orders:  •  Lipid Panel with Direct LDL reflex; Future    Medicare annual wellness visit, subsequent         Erectile dysfunction after radical prostatectomy         Prostate cancer (HCC)         Annual physical exam    Orders:  •  CBC and differential; Future  •  Comprehensive metabolic panel; Future    Screening for diabetes mellitus    Orders:  •  Hemoglobin A1C; Future    Screening for prostate cancer    Orders:  •  PSA, Total Screen; Future    Bilateral hand pain    Orders:  •  hydroxychloroquine (Plaquenil) 200 mg tablet; Take 1 tablet (200 mg total) by mouth daily with breakfast      Depression Screening and Follow-up Plan: Patient was screened for depression during today's encounter. They screened negative with a PHQ-2 score of 0.      Falls Plan of Care: Assessed feet and footwear. Home safety education provided.     ASCVD Risk Management:  The 10-year ASCVD risk score (Agatha DK, et al., 2019) is: 14.8%    Patent does not have underlying ASCVD. Their ASCVD Risk is intermediate (7.5 to < 20 %).    Preventive services discussed: diabetes screening, diet & exercise and weight management.    Preventive health issues were discussed with patient, and age appropriate screening tests were ordered as noted in patient's After Visit Summary. Personalized health advice  and appropriate referrals for health education or preventive services given if needed, as noted in patient's After Visit Summary.    History of Present Illness     The patient is here today to discuss his medicare annual wellness visit.   I reviewed their medical conditions, medications, laboratory and radiology studies.  I reviewed their scheduled future lab studies with the patient.   The patient's current treatment plan is effective.   There is no change in the current therapy.   I ask the patient to continue their current therapy.   The patient voiced their understanding and agreement.   Follow up in 6 months .  Please continue to the PORCH section of the note for details of today's visit.              Patient Care Team:  FAIZAN Bach as PCP - General (Internal Medicine)  CIRA Deras as  Care Manager (Oncology)    Review of Systems   Constitutional:  Negative for activity change, chills, fatigue and fever.   HENT:  Negative for rhinorrhea and sore throat.    Eyes:  Negative for pain.   Respiratory:  Negative for cough and shortness of breath.    Cardiovascular:  Negative for chest pain, palpitations and leg swelling.   Gastrointestinal:  Negative for abdominal pain, constipation, diarrhea, nausea and vomiting.   Genitourinary:  Negative for difficulty urinating, flank pain, frequency and urgency.   Musculoskeletal:  Negative for gait problem, joint swelling and myalgias.   Skin:  Negative for color change.   Neurological:  Negative for dizziness, weakness, light-headedness and headaches.   Psychiatric/Behavioral:  Negative for sleep disturbance. The patient is not nervous/anxious.    All other systems reviewed and are negative.    Medical History Reviewed by provider this encounter:  Tobacco  Allergies  Meds  Problems  Med Hx  Surg Hx  Fam Hx       Annual Wellness Visit Questionnaire   Dami is here for his Subsequent Wellness visit. Last Medicare Wellness visit  information reviewed, patient interviewed and updates made to the record today.      Health Risk Assessment:   Patient rates overall health as good. Patient feels that their physical health rating is same. Patient is satisfied with their life. Eyesight was rated as slightly worse. Hearing was rated as slightly worse. Patient feels that their emotional and mental health rating is same. Patients states they are never, rarely angry. Patient states they are sometimes unusually tired/fatigued. Pain experienced in the last 7 days has been some. Patient's pain rating has been 5/10. Patient states that he has experienced no weight loss or gain in last 6 months.     Depression Screening:   PHQ-2 Score: 0      Fall Risk Screening:   In the past year, patient has experienced: no history of falling in past year      Home Safety:  Patient does not have trouble with stairs inside or outside of their home. Patient has working smoke alarms and has working carbon monoxide detector. Home safety hazards include: none.     Nutrition:   Current diet is Regular.     Medications:   Patient is not currently taking any over-the-counter supplements. Patient is able to manage medications.     Activities of Daily Living (ADLs)/Instrumental Activities of Daily Living (IADLs):   Walk and transfer into and out of bed and chair?: Yes  Dress and groom yourself?: Yes    Bathe or shower yourself?: Yes    Feed yourself? Yes  Do your laundry/housekeeping?: Yes  Manage your money, pay your bills and track your expenses?: Yes  Make your own meals?: Yes    Do your own shopping?: Yes    Previous Hospitalizations:   Any hospitalizations or ED visits within the last 12 months?: Yes    How many hospitalizations have you had in the last year?: 1-2    Advance Care Planning:   Living will: Yes    Durable POA for healthcare: No    Advanced directive: Yes      Cognitive Screening:   Provider or family/friend/caregiver concerned regarding cognition?:  No    PREVENTIVE SCREENINGS      Cardiovascular Screening:    General: Screening Not Indicated and History Lipid Disorder      Diabetes Screening:     General: Screening Current      Colorectal Cancer Screening:     General: Screening Current      Prostate Cancer Screening:    General: History Prostate Cancer      Abdominal Aortic Aneurysm (AAA) Screening:    Risk factors include: age between 65-76 yo        Lung Cancer Screening:     General: Screening Not Indicated      Hepatitis C Screening:    General: Screening Current    Cardiovascular Risk Assessment:  Patient does not have underlying ASCVD and their cardiovascular risk was assessed today. Their cardiovascular risk factors include: hyperlipidemia, overweight/obesity and pre-diabetes or diabetes.     The 10-year ASCVD risk score (Agatha PACE, et al., 2019) is: 14.8%    Values used to calculate the score:      Age: 69 years      Sex: Male      Is Non- : No      Diabetic: No      Tobacco smoker: No      Systolic Blood Pressure: 130 mmHg      Is BP treated: No      HDL Cholesterol: 41 mg/dL      Total Cholesterol: 163 mg/dL    Time spent assessing cardiovascular risk: 5 minutes.     Screening, Brief Intervention, and Referral to Treatment (SBIRT)     Screening  Typical number of drinks in a day: 0  Typical number of drinks in a week: 0  Interpretation: Low risk drinking behavior.    AUDIT-C Screenin) How often did you have a drink containing alcohol in the past year? monthly or less  2) How many drinks did you have on a typical day when you were drinking in the past year? 0  3) How often did you have 6 or more drinks on one occasion in the past year? never    AUDIT-C Score: 1  Interpretation: Score 0-3 (male): Negative screen for alcohol misuse    Single Item Drug Screening:  How often have you used an illegal drug (including marijuana) or a prescription medication for non-medical reasons in the past year? never    Single Item Drug  "Screen Score: 0  Interpretation: Negative screen for possible drug use disorder    SDOH Risk Assessment  Social determinants of health (SDOH) risk assesment tool was completed. The tool at a minimum covered housing stability, food insecurity, transportation needs, and utility difficulty. Patient had at risk responses for the following SDOH domains: food insecurity.     Other Counseling Topics:   Car/seat belt/driving safety, skin self-exam, sunscreen and calcium and vitamin D intake and regular weightbearing exercise.     Social Drivers of Health     Financial Resource Strain: Low Risk  (2/13/2024)    Overall Financial Resource Strain (CARDIA)    • Difficulty of Paying Living Expenses: Not hard at all   Food Insecurity: Food Insecurity Present (2/24/2025)    Hunger Vital Sign    • Worried About Running Out of Food in the Last Year: Sometimes true    • Ran Out of Food in the Last Year: Sometimes true   Transportation Needs: No Transportation Needs (2/24/2025)    PRAPARE - Transportation    • Lack of Transportation (Medical): No    • Lack of Transportation (Non-Medical): No   Housing Stability: Low Risk  (2/24/2025)    Housing Stability Vital Sign    • Unable to Pay for Housing in the Last Year: No    • Number of Times Moved in the Last Year: 0    • Homeless in the Last Year: No   Utilities: Not At Risk (2/24/2025)    [facility] Utilities    • Threatened with loss of utilities: No     No results found.    Objective   /70 (BP Location: Left arm, Patient Position: Sitting, Cuff Size: Large)   Pulse 84   Temp 97.7 °F (36.5 °C) (Tympanic)   Resp 16   Ht 5' 9\" (1.753 m)   Wt 91.2 kg (201 lb)   SpO2 95%   BMI 29.68 kg/m²     Physical Exam  Vitals and nursing note reviewed.   Constitutional:       General: He is awake.      Appearance: Normal appearance. He is well-developed.   HENT:      Head: Normocephalic and atraumatic.      Nose: Nose normal.      Mouth/Throat:      Mouth: Mucous membranes are moist.   Eyes:      " Conjunctiva/sclera: Conjunctivae normal.   Cardiovascular:      Rate and Rhythm: Normal rate and regular rhythm.      Pulses: Normal pulses.      Heart sounds: Normal heart sounds. No murmur heard.  Pulmonary:      Effort: Pulmonary effort is normal. No respiratory distress.      Breath sounds: Normal breath sounds.   Abdominal:      General: Bowel sounds are normal.      Palpations: Abdomen is soft.      Tenderness: There is no abdominal tenderness.   Musculoskeletal:      Cervical back: Neck supple.      Right lower leg: No edema.      Left lower leg: No edema.   Skin:     General: Skin is warm and dry.   Neurological:      Mental Status: He is alert and oriented to person, place, and time.      Motor: Motor function is intact.      Coordination: Coordination is intact.      Gait: Gait is intact.   Psychiatric:         Attention and Perception: Attention normal.         Mood and Affect: Mood normal.         Speech: Speech normal.         Behavior: Behavior normal. Behavior is cooperative.         Thought Content: Thought content normal.         Cognition and Memory: Cognition normal.         Judgment: Judgment normal.       Administrative Statements   I have spent a total time of 45 minutes in caring for this patient on the day of the visit/encounter including Diagnostic results, Prognosis, Risks and benefits of tx options, Instructions for management, Patient and family education, Importance of tx compliance, Risk factor reductions, Impressions, Counseling / Coordination of care, Documenting in the medical record, Reviewing/placing orders in the medical record (including tests, medications, and/or procedures), and Obtaining or reviewing history  .

## 2025-03-07 ENCOUNTER — TELEMEDICINE (OUTPATIENT)
Dept: UROLOGY | Facility: CLINIC | Age: 70
End: 2025-03-07
Payer: MEDICARE

## 2025-03-07 DIAGNOSIS — N52.31 ERECTILE DYSFUNCTION AFTER RADICAL PROSTATECTOMY: ICD-10-CM

## 2025-03-07 DIAGNOSIS — C61 PROSTATE CANCER (HCC): Primary | ICD-10-CM

## 2025-03-07 PROCEDURE — 99214 OFFICE O/P EST MOD 30 MIN: CPT | Performed by: PHYSICIAN ASSISTANT

## 2025-03-07 NOTE — ASSESSMENT & PLAN NOTE
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    Initial postoperative PSA 0.026 with slow steady increase over the past two years to current 0.291. PSMA PET scan in Feb 2024 and Jan 2025 without psma avid disease recurrence or metastasis.    He has met with radiation oncology and counselled on salvage radiation vs observation.     Patient and wife concerned about some side effects with radiation mostly risk of voiding bother or radiation cystitis/proctitis or even urinary retention. He has opted to defer radiation at this time. There was discussion at the visit about addition of antiandrogen therapy if radiation pursued with waiting for psa over 0.7.    We summarized his treatment to this point, and the likelihood he will require some adjuvant treatment in the future. Together we are comfortable with continued observation with next PSA in 3 months. He will meet with Dr Curran that day for continuity of care in Dr Ceja's absence.      Orders:    PSA Total, Diagnostic; Future

## 2025-03-07 NOTE — PROGRESS NOTES
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    Initial postoperative PSA 0.026 with slow steady increase over the past two years to current 0.291. PSMA PET scan in 2024 and 2025 without psma avid disease recurrence or metastasis.    He has met with radiation oncology and counselled on salvage radiation vs observation.     Patient and wife concerned about some side effects with radiation mostly risk of voiding bother or radiation cystitis/proctitis or even urinary retention. He has opted to defer radiation at this time. There was discussion at the visit about addition of antiandrogen therapy if radiation pursued with waiting for psa over 0.7.    We summarized his treatment to this point, and the likelihood he will require some adjuvant treatment in the future. Together we are comfortable with continued observation with next PSA in 3 months. He will meet with Dr Curran that day for continuity of care in Dr Ceja's absence.      Orders:    PSA Total, Diagnostic; Future    Erectile dysfunction after radical prostatectomy  Don had priapism in 2025 with trimix 20/1/30 at 0.3ml dose. aspiration/drainage and sudafed in ER. Took few weeks off. Used trimix 20/1/30 at 0.1ml dose and was effective but lingered about an hour resolved with sudafed. Next tried 0.8ml dose and was ineffective. He is interested in new vial. we discussed decreasing the concentration to 10/1/30 and resuming 0.1ml dose. he will not use any cialis.    new rx faxed to [facility] trimix 10/1/30 will use 0.1ml intracavernosally                 History of  Present Illness     HPI Don had priapism with trimix 20/1/30 at 0.3ml dose. aspiration/drainage and sudafed in ER. Took few weeks off. Used trimix 20/1/30 at 0.1ml dose and was effective but lingered about an hour resolved with sudafed. Next tried 0.8ml dose and was ineffective. He is interested in new vial. we discussed decreasing the concentration to 10/1/30 and resuming 0.1ml dose. he will not use any cialis.    he has biochemically recurrent prostate cancer after RALP in 2019 with robotic left ureteral reimplantation of ectopic ureter into the seminal vesicle.    Initial postoperative PSA 0.026 with slow steady increase over the past two years to current 0.291. PSMA PET scan in Feb 2024 and Jan 2025 without psma avid disease recurrence or metastasis.    He has met with radiation oncology and counselled on salvage radiation vs observation.     Patient and wife concerned about some side effects with radiation mostly risk of voiding bother or radiation cystitis/proctitis or even urinary retention. He has opted to defer radiation at this time. There was discussion at the visit about addition of antiandrogen therapy if radiation pursued with waiting for psa over 0.7.    Will continue observation with next PSA in 3 months.        Review of Systems   Constitutional: Negative.    Respiratory: Negative.     Cardiovascular: Negative.    Genitourinary:  Negative for decreased urine volume, difficulty urinating, dysuria, flank pain, frequency, hematuria and urgency.   Musculoskeletal: Negative.        Objective   There were no vitals taken for this visit.    Physical Exam  Vitals and nursing note reviewed.   Constitutional:       General: He is not in acute distress.     Appearance: He is well-developed. He is not diaphoretic.   HENT:      Head: Normocephalic and atraumatic.   Pulmonary:      Effort: Pulmonary effort is normal.   Skin:     General: Skin is warm.   Neurological:      Mental Status: He is alert and oriented  to person, place, and time.         Administrative Statements   Encounter provider Janette Ford PA-C    The Patient is located at Home and in the following state in which I hold an active license [place].    The patient was identified by name and date of birth. Dami Whitman was informed that this is a telemedicine visit and that the visit is being conducted through the Epic Embedded platform. He agrees to proceed..  My office door was closed. No one else was in the room.  He acknowledged consent and understanding of privacy and security of the video platform. The patient has agreed to participate and understands they can discontinue the visit at any time.    I have spent a total time of 20 minutes in caring for this patient on the day of the visit/encounter including Diagnostic results, Prognosis, Risks and benefits of tx options, Instructions for management, Patient and family education, Importance of tx compliance, Impressions, Documenting in the medical record, Reviewing/placing orders in the medical record (including tests, medications, and/or procedures), Obtaining or reviewing history  , and Communicating with other healthcare professionals , not including the time spent for establishing the audio/video connection.

## 2025-03-07 NOTE — ASSESSMENT & PLAN NOTE
Margarito had priapism in Jan 2025 with trimix 20/1/30 at 0.3ml dose. aspiration/drainage and sudafed in ER. Took few weeks off. Used trimix 20/1/30 at 0.1ml dose and was effective but lingered about an hour resolved with sudafed. Next tried 0.8ml dose and was ineffective. He is interested in new vial. we discussed decreasing the concentration to 10/1/30 and resuming 0.1ml dose. he will not use any cialis.    new rx [facility]ed to [email or web address] trimix 10/1/30 will use 0.1ml intracavernosally

## 2025-03-18 ENCOUNTER — TELEPHONE (OUTPATIENT)
Age: 70
End: 2025-03-18

## 2025-03-18 NOTE — TELEPHONE ENCOUNTER
Pt wife called in and is asking if Janina spoke with a Uro Dr in [place] in regard to pts PSA. Bridgette states at last appt on 2/24 they discussed radiation and were concerned with Donnies PSA raising slowly. Bridgette was wondering if Janina got anywhere with this info.     Please advise and notify pt. She states we can call the pts cell phone. Thanks.

## 2025-04-02 DIAGNOSIS — E78.2 MIXED HYPERLIPIDEMIA: ICD-10-CM

## 2025-04-03 RX ORDER — ATORVASTATIN CALCIUM 20 MG/1
20 TABLET, FILM COATED ORAL EVERY EVENING
Qty: 90 TABLET | Refills: 3 | Status: SHIPPED | OUTPATIENT
Start: 2025-04-03

## 2025-06-03 ENCOUNTER — APPOINTMENT (OUTPATIENT)
Dept: LAB | Facility: CLINIC | Age: 70
End: 2025-06-03
Attending: PHYSICIAN ASSISTANT
Payer: MEDICARE

## 2025-06-03 DIAGNOSIS — C61 PROSTATE CANCER (HCC): ICD-10-CM

## 2025-06-03 LAB — PSA SERPL-MCNC: 0.29 NG/ML (ref 0–4)

## 2025-06-03 PROCEDURE — 84153 ASSAY OF PSA TOTAL: CPT

## 2025-06-03 PROCEDURE — 36415 COLL VENOUS BLD VENIPUNCTURE: CPT

## 2025-06-10 ENCOUNTER — OFFICE VISIT (OUTPATIENT)
Dept: UROLOGY | Facility: CLINIC | Age: 70
End: 2025-06-10
Payer: MEDICARE

## 2025-06-10 VITALS
DIASTOLIC BLOOD PRESSURE: 96 MMHG | BODY MASS INDEX: 30.36 KG/M2 | SYSTOLIC BLOOD PRESSURE: 160 MMHG | OXYGEN SATURATION: 97 % | HEART RATE: 66 BPM | HEIGHT: 69 IN | WEIGHT: 205 LBS

## 2025-06-10 DIAGNOSIS — C61 PROSTATE CANCER (HCC): Primary | ICD-10-CM

## 2025-06-10 PROCEDURE — 99214 OFFICE O/P EST MOD 30 MIN: CPT | Performed by: UROLOGY

## 2025-06-10 NOTE — PROGRESS NOTES
"Name: Dami Whitman      : 1955      MRN: [id number]  Encounter Provider: Jones Curran MD  Encounter Date: 6/10/2025   Encounter department: [facility] UROLOGY [place] END  :  Assessment & Plan  Prostate cancer (HCC)    Orders:  •  PSA Total, Diagnostic; Future      Impression: Larry 7 prostate cancer status post robot-assisted laparoscopic prostatectomy With reimplantation of ectopic ureter, detectable site    Plan: I provided the patient and his wife with reassurance that PSMA PET scans and PSA levels have remained stable.  We did discuss of course that a PSA of 0.289 is not considered \"normal\" after prostatectomy and that his PSA should go to undetectable.  I recommend that he return in follow-up in 6 months for his next PSA level.  If the PSA continues to rise neck steps would include repeating a PSMA CT PET scan as well as a referral back to radiation oncology to consider definitive treatment.    History of Present Illness   Dami Whitman is a 69 y.o. male who presents in follow-up today with a history of prostate cancer.  He underwent robot-assisted laparoscopic prostatectomy with Dr. Ceja in 2019.  He had an ectopic ureter inserting onto a seminal vesicle.  This was in a duplicated system.  He underwent a ureteral reimplant at that time.  He had a PSA recurrence with a slowly rising PSA.  His most recent PSA level was 0.289 and relatively stable over the course of the last year.  His pretreatment PSA was 13.4.  Pathology at the time of prostatectomy revealed a focally positive margin with extracapsular extension.  Larry 3+4 disease was identified.  2 lymph nodes were negative.  He has been referred to radiation oncology to discuss possible adjuvant radiation therapy.  At this time he is opted for observation.  He is dry and wears no pads.    He had a PSMA CT PET scan performed in 2025 which shows no evidence PSMA avid disease.    AUA SYMPTOM SCORE  " "    Flowsheet Row Most Recent Value   AUA SYMPTOM SCORE    How often have you had a sensation of not emptying your bladder completely after you finished urinating? 1 (P)     How often have you had to urinate again less than two hours after you finished urinating? 0 (P)     How often have you found you stopped and started again several times when you urinate? 1 (P)     How often have you found it difficult to postpone urination? 0 (P)     How often have you had a weak urinary stream? 0 (P)     How often have you had to push or strain to begin urination? 1 (P)     How many times did you most typically get up to urinate from the time you went to bed at night until the time you got up in the morning? 1 (P)     Quality of Life: If you were to spend the rest of your life with your urinary condition just the way it is now, how would you feel about that? 0 (P)     AUA SYMPTOM SCORE 4 (P)            Review of Systems       Objective   /96 (BP Location: Left arm, Patient Position: Sitting, Cuff Size: Adult)   Pulse 66   Ht 5' 9\" (1.753 m)   Wt 93 kg (205 lb)   SpO2 97%   BMI 30.27 kg/m²     Physical Exam on examination he is in no acute distress.  His abdomen is soft nontender nondistended.  Incisions well-healed without hernia skin is warm.  Extremities without edema.  Neurologic is grossly intact and nonfocal.    Results   Lab Results   Component Value Date    PSA 0.289 06/03/2025    PSA 0.291 02/18/2025    PSA 0.269 01/10/2025     Lab Results   Component Value Date    CALCIUM 9.8 02/18/2025    K 4.4 02/18/2025    CO2 30 02/18/2025     02/18/2025    BUN 15 02/18/2025    CREATININE 1.18 02/18/2025     Lab Results   Component Value Date    WBC 7.91 02/18/2025    HGB 14.6 02/18/2025    HCT 43.7 02/18/2025    MCV 90 02/18/2025     02/18/2025       Office Urine Dip  No results found for this or any previous visit (from the past hour).      "

## 2025-07-26 ENCOUNTER — OCCMED (OUTPATIENT)
Dept: URGENT CARE | Facility: CLINIC | Age: 70
End: 2025-07-26

## 2025-07-26 DIAGNOSIS — Z02.89 ENCOUNTER FOR EXAMINATION REQUIRED BY DEPARTMENT OF TRANSPORTATION (DOT): Primary | ICD-10-CM

## (undated) DEVICE — KERLIX BANDAGE ROLL: Brand: KERLIX

## (undated) DEVICE — SUT PDS II 0 CT-1 27 IN Z340H

## (undated) DEVICE — CATH URETERAL 5FR X 70 CM FLEX TIP POLYUR BARD

## (undated) DEVICE — FLOSEAL HEMOSTATIC MATRIX, 10 ML: Brand: FLOSEAL

## (undated) DEVICE — MONOPOLAR CURVED SCISSORS: Brand: ENDOWRIST;DAVINCI SI

## (undated) DEVICE — CHLORAPREP HI-LITE 26ML ORANGE

## (undated) DEVICE — SUT MONOCRYL 3-0 RB-1 27 IN Y305H

## (undated) DEVICE — LARGE NEEDLE DRIVER: Brand: ENDOWRIST

## (undated) DEVICE — INSUFFLATION NEEDLE TO ESTABLISH PNEUMOPERITONEUM.: Brand: INSUFFLATION NEEDLE

## (undated) DEVICE — STRL COTTON TIP APPLCTR 6IN PK: Brand: CARDINAL HEALTH

## (undated) DEVICE — TROCAR: Brand: KII FIOS FIRST ENTRY

## (undated) DEVICE — GLOVE SRG BIOGEL ECLIPSE 6.5

## (undated) DEVICE — CANNULA SEAL

## (undated) DEVICE — NEEDLE 25G X 1 1/2

## (undated) DEVICE — SUT ETHILON 3-0 FS-1 18 IN 663G

## (undated) DEVICE — SUT VLOC 90 3-0 CV-23 9 IN VLOCM0844

## (undated) DEVICE — HEM-O-LOK CLIP CARTRIDGE LARGE DA VINCI SI/XI

## (undated) DEVICE — SUT VLOC 90 3-0 90 CV-23 L9 IN VLOCM1944

## (undated) DEVICE — HEAVY DRAINAGE PACK: Brand: CURITY

## (undated) DEVICE — SPECIMEN CONTAINER STERILE PEEL PACK

## (undated) DEVICE — DRAPE EQUIPMENT RF WAND

## (undated) DEVICE — STRL PENROSE DRAIN 18" X 1/4": Brand: CARDINAL HEALTH

## (undated) DEVICE — URIMETER 2500ML

## (undated) DEVICE — COLUMN DRAPE

## (undated) DEVICE — SUT MONOCRYL 4-0 RB-1 27 IN Y304H

## (undated) DEVICE — JACKSON-PRATT 100CC BULB RESERVOIR: Brand: CARDINAL HEALTH

## (undated) DEVICE — GLOVE SRG BIOGEL 8

## (undated) DEVICE — SYRINGE 10ML LL

## (undated) DEVICE — POSITIONER EGGCRATE

## (undated) DEVICE — CATH FOLEY 18FR 5ML 2 WAY SILICONE ELASTIMER

## (undated) DEVICE — GLOVE INDICATOR PI UNDERGLOVE SZ 6.5 BLUE

## (undated) DEVICE — GLOVE SRG BIOGEL 7

## (undated) DEVICE — DRAPE C-ARM X-RAY

## (undated) DEVICE — SUT MONOCRYL 4-0 PS-2 27 IN Y426H

## (undated) DEVICE — 3M™ DURAPORE™ SURGICAL TAPE 1538-3, 3 INCH X 10 YARD (7,5CM X 9,1M), 4 ROLLS/BOX: Brand: 3M™ DURAPORE™

## (undated) DEVICE — JP CHANNEL DRAIN, 19FR HUBLESS: Brand: CARDINAL HEALTH

## (undated) DEVICE — ASTOUND STANDARD SURGICAL GOWN, XL: Brand: CONVERTORS

## (undated) DEVICE — SURGICEL 4 X 8

## (undated) DEVICE — TIP COVER ACCESSORY

## (undated) DEVICE — [HIGH FLOW INSUFFLATOR,  DO NOT USE IF PACKAGE IS DAMAGED,  KEEP DRY,  KEEP AWAY FROM SUNLIGHT,  PROTECT FROM HEAT AND RADIOACTIVE SOURCES.]: Brand: PNEUMOSURE

## (undated) DEVICE — GLOVE INDICATOR PI UNDERGLOVE SZ 8 BLUE

## (undated) DEVICE — ARM DRAPE

## (undated) DEVICE — SUT VICRYL 0 UR-6 27 IN J603H

## (undated) DEVICE — SUT VICRYL 0 CT-1 27 IN J260H

## (undated) DEVICE — MARYLAND BIPOLAR FORCEPS: Brand: ENDOWRIST

## (undated) DEVICE — INTENDED FOR TISSUE SEPARATION, AND OTHER PROCEDURES THAT REQUIRE A SHARP SURGICAL BLADE TO PUNCTURE OR CUT.: Brand: BARD-PARKER SAFETY BLADES SIZE 11, STERILE

## (undated) DEVICE — GUIDEWIRE STRGHT TIP 0.035 IN  SOLO PLUS

## (undated) DEVICE — GLOVE PI ULTRA TOUCH SZ.6.5

## (undated) DEVICE — PROGRASP FORCEPS: Brand: ENDOWRIST

## (undated) DEVICE — ENDOPOUCH RETRIEVER SPECIMEN RETRIEVAL BAGS: Brand: ENDOPOUCH RETRIEVER

## (undated) DEVICE — CATH FOLEY 20FR 5ML 2 WAY SILICONE ELASTIMER

## (undated) DEVICE — KIT, BETHLEHEM ROBOTIC PROST: Brand: CARDINAL HEALTH

## (undated) DEVICE — SYRINGE 20ML LL

## (undated) DEVICE — DRAPE SHEET X-LG

## (undated) DEVICE — ADHESIVE SKN CLSR HISTOACRYL FLEX 0.5ML LF